# Patient Record
Sex: MALE | Race: WHITE | NOT HISPANIC OR LATINO | Employment: FULL TIME | URBAN - METROPOLITAN AREA
[De-identification: names, ages, dates, MRNs, and addresses within clinical notes are randomized per-mention and may not be internally consistent; named-entity substitution may affect disease eponyms.]

---

## 2017-06-21 ENCOUNTER — OFFICE VISIT (OUTPATIENT)
Dept: FAMILY MEDICINE CLINIC | Facility: CLINIC | Age: 32
End: 2017-06-21

## 2017-06-21 VITALS
SYSTOLIC BLOOD PRESSURE: 118 MMHG | BODY MASS INDEX: 30.7 KG/M2 | WEIGHT: 195.6 LBS | HEIGHT: 67 IN | DIASTOLIC BLOOD PRESSURE: 74 MMHG | OXYGEN SATURATION: 98 % | TEMPERATURE: 98.3 F | HEART RATE: 65 BPM

## 2017-06-21 DIAGNOSIS — R42 VERTIGO: ICD-10-CM

## 2017-06-21 DIAGNOSIS — S06.6X9A: ICD-10-CM

## 2017-06-21 DIAGNOSIS — G40.509 NONINTRACTABLE EPILEPSY DUE TO EXTERNAL CAUSES, WITHOUT STATUS EPILEPTICUS (HCC): ICD-10-CM

## 2017-06-21 DIAGNOSIS — S22.32XA CLOSED FRACTURE OF RIB OF LEFT SIDE, INITIAL ENCOUNTER: ICD-10-CM

## 2017-06-21 DIAGNOSIS — J18.9 PNEUMONIA OF BOTH LUNGS DUE TO INFECTIOUS ORGANISM, UNSPECIFIED PART OF LUNG: Primary | ICD-10-CM

## 2017-06-21 DIAGNOSIS — Z09 HOSPITAL DISCHARGE FOLLOW-UP: ICD-10-CM

## 2017-06-21 DIAGNOSIS — M25.512 ACUTE PAIN OF LEFT SHOULDER: ICD-10-CM

## 2017-06-21 PROBLEM — S22.39XA RIB FRACTURE: Status: ACTIVE | Noted: 2017-06-21

## 2017-06-21 PROBLEM — G40.909 EPILEPSY: Status: ACTIVE | Noted: 2017-06-21

## 2017-06-21 PROCEDURE — 99214 OFFICE O/P EST MOD 30 MIN: CPT | Performed by: INTERNAL MEDICINE

## 2017-06-21 PROCEDURE — 85025 COMPLETE CBC W/AUTO DIFF WBC: CPT | Performed by: INTERNAL MEDICINE

## 2017-06-21 PROCEDURE — 71020 XR CHEST PA AND LATERAL: CPT | Performed by: INTERNAL MEDICINE

## 2017-06-21 PROCEDURE — 73020 X-RAY EXAM OF SHOULDER: CPT | Performed by: INTERNAL MEDICINE

## 2017-06-21 RX ORDER — CLINDAMYCIN HYDROCHLORIDE 300 MG/1
300 CAPSULE ORAL 3 TIMES DAILY
Qty: 15 CAPSULE | Refills: 0 | Status: SHIPPED | OUTPATIENT
Start: 2017-06-21 | End: 2017-07-06

## 2017-06-21 RX ORDER — LEVETIRACETAM 500 MG/1
TABLET ORAL
Refills: 1 | COMMUNITY
Start: 2017-06-15 | End: 2017-08-30

## 2017-06-21 RX ORDER — CLINDAMYCIN HYDROCHLORIDE 300 MG/1
300 CAPSULE ORAL 3 TIMES DAILY
Qty: 15 CAPSULE | Refills: 0 | Status: SHIPPED | OUTPATIENT
Start: 2017-06-21 | End: 2017-06-21 | Stop reason: SDUPTHER

## 2017-06-21 NOTE — PROGRESS NOTES
Subjective   Garrick Guo is a 31 y.o. male. Patient is here today for hospital follow-up following a complicated history.  Apparently he may have had a fall at home and was found having seizures.  He was transported [facility] where he was found to have what looked like an aspiration pneumonia and was on a ventilator initially.  He was discharged on Augusto an anabiotic.  Subsequently developed some vertigo and was seen at a different emergency room had a CT scan of the brain which showed a subarachnoid hemorrhage and was transferred to [place].  He was discharged from there on Keppra and told that he had a stable subarachnoid hemorrhage and to rib fractures on the left and that he would need ENT and neurology follow-ups.  Here in the office the patient seems alert and stable and is having no respiratory problems.  He is having some left chest pain and some left shoulder pain.  I have a discharge summary from [facility] but no real information from [facility].    Chief Complaint   Patient presents with   • Fall     follow up from hospital           Vitals:    06/21/17 1001   BP: 118/74   Pulse: 65   Temp: 98.3 °F (36.8 °C)   SpO2: 98%     The following portions of the patient's history were reviewed and updated as appropriate: allergies, current medications, past family history, past medical history, past social history, past surgical history and problem list.    Past Medical History:   Diagnosis Date   • Acute sinusitis    • ADD (attention deficit disorder)    • Carpal tunnel syndrome    • Chronic bronchitis    • Viral URI       Allergies   Allergen Reactions   • Amoxicillin       Social History     Social History   • Marital status:      Spouse name: N/A   • Number of children: N/A   • Years of education: N/A     Occupational History   • Not on file.     Social History Main Topics   • Smoking status: Former Smoker   • Smokeless tobacco: Never Used   • Alcohol use No    • Drug use: No   • Sexual activity: Not Currently     Other Topics Concern   • Not on file     Social History Narrative   • No narrative on file        Current Outpatient Prescriptions:   •  levETIRAcetam (KEPPRA) 500 MG tablet, TK 1 T PO BID, Disp: , Rfl: 1  •  montelukast (SINGULAIR) 10 MG tablet, Take 1 tablet by mouth nightly., Disp: , Rfl:   •  fluticasone (FLONASE) 50 MCG/ACT nasal spray, into each nostril daily. Use 2 sprays in each nostril once daily, Disp: , Rfl:      Objective     History of Present Illness     Review of Systems   Constitutional: Negative.    HENT: Negative.    Eyes: Negative.    Respiratory: Negative.    Cardiovascular: Negative.    Gastrointestinal: Negative.    Genitourinary: Negative.    Musculoskeletal:        Left shoulder pain with movements and left lateral chest pain   Skin: Negative.    Neurological: Positive for seizures.   Psychiatric/Behavioral: Negative.        Physical Exam   Constitutional: He appears well-developed and well-nourished.   Pleasant, cooperative and in no distress with blood pressure 120/80 and quite alert.   HENT:   Head: Normocephalic and atraumatic.   Eyes: Conjunctivae are normal. Pupils are equal, round, and reactive to light.   Neck: Normal range of motion. Neck supple. No thyromegaly present.   Cardiovascular: Normal rate, regular rhythm and normal heart sounds.    No murmur heard.  Pulmonary/Chest: Effort normal and breath sounds normal. No respiratory distress. He has no wheezes. He has no rales. He exhibits tenderness.   Left anterior lateral chest tenderness to palpation   Abdominal: Soft.   Musculoskeletal:   Left shoulder pain with movements   Neurological: He is alert.   Skin: Skin is warm and dry.   Psychiatric: He has a normal mood and affect. His behavior is normal.   Nursing note and vitals reviewed.      ASSESSMENT  the patient appears stable currently.  Blood pressures fine and his lungs sound pretty clear.  He is having left lateral  chest pain and left shoulder pain.  He's had no further seizures since being on the Keppra.  By history he also has a subarachnoid hemorrhage.  He only has completed a 5 day course of clindamycin.  His chest x-ray was reviewed and generally appears normal to me.  Also I could see no fracture in his left shoulder     Problem List Items Addressed This Visit        Respiratory    Pneumonia - Primary    Relevant Orders    POC CBC With / Auto Diff    XR Chest PA & Lateral       Nervous and Auditory    Epilepsy    Relevant Medications    levETIRAcetam (KEPPRA) 500 MG tablet    Other Relevant Orders    Comprehensive Metabolic Panel    Levetiracetam Level (Keppra)       Musculoskeletal and Integument    Rib fracture    Relevant Orders    XR Chest PA & Lateral    XR Shoulder 1 View Left      Other Visit Diagnoses     Acute pain of left shoulder              PLAN  The patient will not drive.  He was advised to not use alcohol.  The patient will complete a 10 day course of clindamycin 300 mg 3 times a day.  The patient will arrange for follow-up visit the [facility] neurology clinic and I have referred him to Dr. Bates, ENT for his vertigo.  I plan on seeing him back in 1-2 weeks to see how things are doing.    There are no Patient Instructions on file for this visit.  No Follow-up on file.

## 2017-06-23 LAB
ALBUMIN SERPL-MCNC: 4.7 G/DL (ref 3.5–5.2)
ALBUMIN/GLOB SERPL: 1.8 G/DL
ALP SERPL-CCNC: 51 U/L (ref 39–117)
ALT SERPL-CCNC: 147 U/L (ref 1–41)
AST SERPL-CCNC: 78 U/L (ref 1–40)
BILIRUB SERPL-MCNC: 0.3 MG/DL (ref 0.1–1.2)
BUN SERPL-MCNC: 14 MG/DL (ref 6–20)
BUN/CREAT SERPL: 17.5 (ref 7–25)
CALCIUM SERPL-MCNC: 10.4 MG/DL (ref 8.6–10.5)
CHLORIDE SERPL-SCNC: 101 MMOL/L (ref 98–107)
CO2 SERPL-SCNC: 23.8 MMOL/L (ref 22–29)
CREAT SERPL-MCNC: 0.8 MG/DL (ref 0.76–1.27)
GLOBULIN SER CALC-MCNC: 2.6 GM/DL
GLUCOSE SERPL-MCNC: 89 MG/DL (ref 65–99)
LEVETIRACETAM SERPL-MCNC: 11.1 UG/ML (ref 10–40)
POTASSIUM SERPL-SCNC: 4.7 MMOL/L (ref 3.5–5.2)
PROT SERPL-MCNC: 7.3 G/DL (ref 6–8.5)
SODIUM SERPL-SCNC: 141 MMOL/L (ref 136–145)

## 2017-07-06 ENCOUNTER — OFFICE VISIT (OUTPATIENT)
Dept: FAMILY MEDICINE CLINIC | Facility: CLINIC | Age: 32
End: 2017-07-06

## 2017-07-06 VITALS
WEIGHT: 195.6 LBS | HEIGHT: 67 IN | OXYGEN SATURATION: 98 % | SYSTOLIC BLOOD PRESSURE: 120 MMHG | DIASTOLIC BLOOD PRESSURE: 78 MMHG | HEART RATE: 51 BPM | BODY MASS INDEX: 30.7 KG/M2 | TEMPERATURE: 98.6 F

## 2017-07-06 DIAGNOSIS — S06.6X9A: ICD-10-CM

## 2017-07-06 DIAGNOSIS — G40.509 NONINTRACTABLE EPILEPSY DUE TO EXTERNAL CAUSES, WITHOUT STATUS EPILEPTICUS (HCC): ICD-10-CM

## 2017-07-06 DIAGNOSIS — M25.512 ACUTE PAIN OF LEFT SHOULDER: ICD-10-CM

## 2017-07-06 DIAGNOSIS — J18.9 PNEUMONIA OF BOTH LUNGS DUE TO INFECTIOUS ORGANISM, UNSPECIFIED PART OF LUNG: Primary | ICD-10-CM

## 2017-07-06 PROCEDURE — 99213 OFFICE O/P EST LOW 20 MIN: CPT | Performed by: INTERNAL MEDICINE

## 2017-07-06 RX ORDER — MONTELUKAST SODIUM 10 MG/1
10 TABLET ORAL NIGHTLY
Qty: 30 TABLET | Refills: 5 | Status: SHIPPED | OUTPATIENT
Start: 2017-07-06 | End: 2017-11-08 | Stop reason: ALTCHOICE

## 2017-07-06 NOTE — PROGRESS NOTES
Subjective   Garrick Guo is a 31 y.o. male. Patient is here today for follow-up on epilepsy, a subarachnoid hemorrhage, vertigo, history of recent rib fracture and continuing left shoulder pain.  Since I saw him last patient's had no further seizures or problems.  Neurologically he is intact.  His chest and rib pain are improved.  He still having significant left shoulder pain with decreased range of motion and tenderness when he tries to lay on it.  He's not had any breathing problems.  He does have an upcoming appointment with the ENT specialist as well as the neurologists at [facility].     Chief Complaint   Patient presents with   • Follow-up     FROM HOSP STAY AND FU AFTER ANTIBIOTICS          Vitals:    07/06/17 1004   BP: 120/78   Pulse: 51   Temp: 98.6 °F (37 °C)   SpO2: 98%     The following portions of the patient's history were reviewed and updated as appropriate: allergies, current medications, past family history, past medical history, past social history, past surgical history and problem list.    Past Medical History:   Diagnosis Date   • Acute sinusitis    • ADD (attention deficit disorder)    • Carpal tunnel syndrome    • Chronic bronchitis    • Viral URI       Allergies   Allergen Reactions   • Amoxicillin       Social History     Social History   • Marital status:      Spouse name: N/A   • Number of children: N/A   • Years of education: N/A     Occupational History   • Not on file.     Social History Main Topics   • Smoking status: Former Smoker   • Smokeless tobacco: Never Used   • Alcohol use No   • Drug use: No   • Sexual activity: Not Currently     Other Topics Concern   • Not on file     Social History Narrative        Current Outpatient Prescriptions:   •  fluticasone (FLONASE) 50 MCG/ACT nasal spray, into each nostril daily. Use 2 sprays in each nostril once daily, Disp: , Rfl:   •  levETIRAcetam (KEPPRA) 500 MG tablet, TK 1 T PO BID, Disp: , Rfl: 1  •  montelukast  (SINGULAIR) 10 MG tablet, Take 1 tablet by mouth Every Night., Disp: 30 tablet, Rfl: 5     Objective     History of Present Illness     Review of Systems   Constitutional: Negative.    HENT: Negative.    Eyes: Negative.    Respiratory: Negative.    Cardiovascular: Negative.    Gastrointestinal: Negative.    Genitourinary: Negative.    Musculoskeletal: Negative.    Skin: Negative.    Neurological: Negative.    Psychiatric/Behavioral: Negative.        Physical Exam   Constitutional: He appears well-developed and well-nourished.   Pleasant, cooperative and in no distress with a blood pressure 120/80   HENT:   Head: Normocephalic and atraumatic.   Eyes: Conjunctivae are normal.   Neck: Normal range of motion. Neck supple.   Cardiovascular: Normal rate, regular rhythm and normal heart sounds.    Pulmonary/Chest: Effort normal and breath sounds normal. No respiratory distress. He has no wheezes. He has no rales.   Musculoskeletal: He exhibits tenderness.   Patient has tenderness in the left shoulder area and can only abduct it to about 90°.   Neurological: He is alert.   Skin: Skin is warm and dry.   Psychiatric: He has a normal mood and affect. His behavior is normal.   Nursing note and vitals reviewed.      ASSESSMENT  the patient generally seems stable and is had no further seizures.  His chest wall discomfort is improving and his breathing is fine.  His lungs are clear and her nothing to suggest a pneumonia.  He is having significant left shoulder pain that's better but still seems significant.     Problem List Items Addressed This Visit        Respiratory    RESOLVED: Pneumonia - Primary    Relevant Medications    montelukast (SINGULAIR) 10 MG tablet       Nervous and Auditory    Epilepsy    Subarachnoid hemorrhage following injury, with loss of consciousness    Acute pain of left shoulder    Relevant Orders    Ambulatory Referral to Orthopedic Surgery          PLAN  The patient will be seeing the neurologist and ENT  specialist.  I am going to refer him to Dr. Philip's group, orthopedics to see about his left shoulder discomfort.  I don't need to schedule him back for follow-up at this time      There are no Patient Instructions on file for this visit.  No Follow-up on file.

## 2017-08-30 ENCOUNTER — OFFICE VISIT (OUTPATIENT)
Dept: FAMILY MEDICINE CLINIC | Facility: CLINIC | Age: 32
End: 2017-08-30

## 2017-08-30 VITALS
HEIGHT: 67 IN | TEMPERATURE: 98.2 F | WEIGHT: 197.4 LBS | SYSTOLIC BLOOD PRESSURE: 120 MMHG | OXYGEN SATURATION: 98 % | BODY MASS INDEX: 30.98 KG/M2 | DIASTOLIC BLOOD PRESSURE: 80 MMHG | HEART RATE: 66 BPM

## 2017-08-30 DIAGNOSIS — J40 BRONCHITIS: ICD-10-CM

## 2017-08-30 DIAGNOSIS — G40.509 NONINTRACTABLE EPILEPSY DUE TO EXTERNAL CAUSES, WITHOUT STATUS EPILEPTICUS (HCC): ICD-10-CM

## 2017-08-30 DIAGNOSIS — L98.9 SKIN LESION: ICD-10-CM

## 2017-08-30 DIAGNOSIS — S22.32XS: ICD-10-CM

## 2017-08-30 DIAGNOSIS — M25.512 ACUTE PAIN OF LEFT SHOULDER: Primary | ICD-10-CM

## 2017-08-30 PROBLEM — S06.6X9A SUBARACHNOID HEMORRHAGE FOLLOWING INJURY, WITH LOSS OF CONSCIOUSNESS (HCC): Status: RESOLVED | Noted: 2017-06-21 | Resolved: 2017-08-30

## 2017-08-30 PROBLEM — S22.39XA RIB FRACTURE: Status: RESOLVED | Noted: 2017-06-21 | Resolved: 2017-08-30

## 2017-08-30 PROCEDURE — 90715 TDAP VACCINE 7 YRS/> IM: CPT | Performed by: INTERNAL MEDICINE

## 2017-08-30 PROCEDURE — 99214 OFFICE O/P EST MOD 30 MIN: CPT | Performed by: INTERNAL MEDICINE

## 2017-08-30 PROCEDURE — 90471 IMMUNIZATION ADMIN: CPT | Performed by: INTERNAL MEDICINE

## 2017-08-30 RX ORDER — LEVETIRACETAM 750 MG/1
750 TABLET ORAL 2 TIMES DAILY
Qty: 180 TABLET | Refills: 3 | Status: SHIPPED | OUTPATIENT
Start: 2017-08-30 | End: 2017-12-01 | Stop reason: SDUPTHER

## 2017-08-30 RX ORDER — LEVETIRACETAM 750 MG/1
TABLET ORAL
Refills: 0 | COMMUNITY
Start: 2017-08-08 | End: 2017-08-30 | Stop reason: SDUPTHER

## 2017-08-30 NOTE — PROGRESS NOTES
Subjective   Garrick Guo is a 31 y.o. male. Patient is here today for follow-up on his seizure disorder controlled with Keppra, left shoulder injury related to epilepsy.  Patient's been receiving physical therapy and his shoulder is much improved and almost completely back to normal.  He saw the neurologist yesterday and has generally been doing okay on the Keppra twice a day.  Patient does have some cough that's been going on for several weeks that it's intermittent and sometimes productive.  His sons being checked for whooping cough and he would like to get a tetanus with whooping cough immunization.  Finally the patient has a 1 cm pedunculated vascular appearing lesion on his left forearm any like to get removed.  It's been present for several months and enlarging and has been injured a couple of times    Past medical history history of a possible subarachnoid hemorrhage that resolved without specific treatment  Chief Complaint   Patient presents with   • Altered Mental Status     PT HAD TWO HOSPITAL VISITS RECENTLY, PT HAD MULTIPLE SEIZURES AFTER FRACTURING SKULL THAT CAUSED A HEMORRAGE AND THEN ON THE 7TH, WENT  TO WORK COULDN'T REMEMBER ANYTHING, WENT TO HOSPITAL AND PT SAID HE IS STILL HAVING SOME AMNESIA    • Cough     PT QUIT SMOKING AFTER HIS FIRST HOSPITAL VISIT, AND HE HAS BEEN HAVING A PROGRESSIVE COUGH SINCE THEN           Vitals:    08/30/17 0856   BP: 120/80   Pulse: 66   Temp: 98.2 °F (36.8 °C)   SpO2: 98%     The following portions of the patient's history were reviewed and updated as appropriate: allergies, current medications, past family history, past medical history, past social history, past surgical history and problem list.    Past Medical History:   Diagnosis Date   • Acute sinusitis    • ADD (attention deficit disorder)    • Carpal tunnel syndrome    • Chronic bronchitis    • Viral URI       Allergies   Allergen Reactions   • Amoxicillin       Social History     Social History    • Marital status:      Spouse name: N/A   • Number of children: N/A   • Years of education: N/A     Occupational History   • Not on file.     Social History Main Topics   • Smoking status: Former Smoker   • Smokeless tobacco: Never Used   • Alcohol use No   • Drug use: No   • Sexual activity: Not Currently     Other Topics Concern   • Not on file     Social History Narrative        Current Outpatient Prescriptions:   •  fluticasone (FLONASE) 50 MCG/ACT nasal spray, into each nostril daily. Use 2 sprays in each nostril once daily, Disp: , Rfl:   •  levETIRAcetam (KEPPRA) 750 MG tablet, Take 1 tablet by mouth 2 (Two) Times a Day., Disp: 180 tablet, Rfl: 3  •  montelukast (SINGULAIR) 10 MG tablet, Take 1 tablet by mouth Every Night., Disp: 30 tablet, Rfl: 5     Objective     History of Present Illness     Review of Systems   Constitutional: Negative.    HENT: Negative.    Eyes: Negative.    Respiratory: Negative.    Cardiovascular: Negative.    Gastrointestinal: Negative.    Genitourinary: Negative.    Musculoskeletal: Negative.    Skin: Negative.    Neurological: Negative.    Psychiatric/Behavioral: Negative.        Physical Exam   Constitutional: He is oriented to person, place, and time. He appears well-developed and well-nourished.   Pleasant, cooperative and in no distress with a blood pressure 120/80   HENT:   Head: Normocephalic and atraumatic.   Eyes: Conjunctivae are normal. Pupils are equal, round, and reactive to light.   Neck: Normal range of motion. Neck supple. No thyromegaly present.   Cardiovascular: Normal rate, regular rhythm and normal heart sounds.    Pulmonary/Chest: Effort normal and breath sounds normal. No respiratory distress. He has no wheezes. He has no rales.   Musculoskeletal: Normal range of motion. He exhibits no edema.   Neurological: He is alert and oriented to person, place, and time. No cranial nerve deficit. Coordination normal.   Skin: Skin is warm and dry.   There is a 1  cm pedunculated papilloma that appears quite vascular on the left lateral forearm.   Psychiatric: He has a normal mood and affect. His behavior is normal.   Nursing note and vitals reviewed.      ASSESSMENT  1-epilepsy under good control on Keppra twice daily  2-left shoulder injury related to seizure that is almost completely resolved with physical therapy  #3-pedunculated papilloma left forearm  #4-history of possible subarachnoid hemorrhage that's resolved on most recent CT scan  #5-mild bronchitis symptoms, possibly allergic     Problem List Items Addressed This Visit        Nervous and Auditory    Epilepsy    Relevant Medications    levETIRAcetam (KEPPRA) 750 MG tablet    Acute pain of left shoulder - Primary       Musculoskeletal and Integument    Skin lesion    Relevant Orders    Ambulatory Referral to General Surgery    RESOLVED: Rib fracture      Other Visit Diagnoses     Bronchitis              PLAN  The patient received a Tdap immunization today per his request and son having possible whooping cough.  Patient will continue physical therapy as needed.  He will continue the Keppra and I will have him get back on Zyrtec, Flonase and Singulair.  I referred the patient to surgical group, Dr. Flores's group for removal of the skin lesion on his left forearm.  I plan on rechecking him in 6 months with a CBC, CMP, lipid panel and Keppra level    There are no Patient Instructions on file for this visit.  Return in about 6 months (around 2/28/2018) for with labs.

## 2017-09-07 ENCOUNTER — OFFICE VISIT (OUTPATIENT)
Dept: FAMILY MEDICINE CLINIC | Facility: CLINIC | Age: 32
End: 2017-09-07

## 2017-09-07 VITALS
WEIGHT: 194 LBS | RESPIRATION RATE: 18 BRPM | HEIGHT: 67 IN | BODY MASS INDEX: 30.45 KG/M2 | SYSTOLIC BLOOD PRESSURE: 122 MMHG | DIASTOLIC BLOOD PRESSURE: 70 MMHG | HEART RATE: 54 BPM

## 2017-09-07 DIAGNOSIS — G40.509 NONINTRACTABLE EPILEPSY DUE TO EXTERNAL CAUSES, WITHOUT STATUS EPILEPTICUS (HCC): Primary | ICD-10-CM

## 2017-09-07 DIAGNOSIS — F19.10 SUBSTANCE ABUSE (HCC): ICD-10-CM

## 2017-09-07 PROCEDURE — 99213 OFFICE O/P EST LOW 20 MIN: CPT | Performed by: INTERNAL MEDICINE

## 2017-09-07 NOTE — PROGRESS NOTES
Subjective   Garrick Guo is a 31 y.o. male. Patient is here today for   Chief Complaint   Patient presents with   • Memory Loss          Vitals:    09/07/17 1013   BP: 122/70   Pulse: 54   Resp: 18     The following portions of the patient's history were reviewed and updated as appropriate: allergies, current medications, past family history, past medical history, past social history, past surgical history and problem list.    Past Medical History:   Diagnosis Date   • Acute sinusitis    • ADD (attention deficit disorder)    • Carpal tunnel syndrome    • Chronic bronchitis    • Viral URI       Allergies   Allergen Reactions   • Amoxicillin       Social History     Social History   • Marital status:      Spouse name: N/A   • Number of children: N/A   • Years of education: N/A     Occupational History   • Not on file.     Social History Main Topics   • Smoking status: Former Smoker   • Smokeless tobacco: Never Used   • Alcohol use No   • Drug use: No   • Sexual activity: Not Currently     Other Topics Concern   • Not on file     Social History Narrative        Current Outpatient Prescriptions:   •  fluticasone (FLONASE) 50 MCG/ACT nasal spray, into each nostril daily. Use 2 sprays in each nostril once daily, Disp: , Rfl:   •  levETIRAcetam (KEPPRA) 750 MG tablet, Take 1 tablet by mouth 2 (Two) Times a Day., Disp: 180 tablet, Rfl: 3  •  montelukast (SINGULAIR) 10 MG tablet, Take 1 tablet by mouth Every Night., Disp: 30 tablet, Rfl: 5     Objective     History of Present Illness Garrick was driving to work yesterday stopped and bought a pack of cigarettes.  He quit smoking months ago and wonders why he would do that.  He has a seizure disorder and is on Keppra.  He saw his neurologist last week who felt that he was stable.  He uses marijuana product on a daily basis.  When he got to work he felt as though he had some symptoms of a panic attack.    Review of Systems   Constitutional: Negative.     Respiratory: Negative.    Cardiovascular: Negative.    Psychiatric/Behavioral: Positive for confusion.       Physical Exam   Constitutional: He appears well-developed and well-nourished.   Neurological: He is alert. No cranial nerve deficit. He displays a negative Romberg sign. Coordination and gait normal.   Reflex Scores:       Bicep reflexes are 2+ on the right side and 2+ on the left side.       Brachioradialis reflexes are 2+ on the right side and 2+ on the left side.       Patellar reflexes are 2+ on the right side and 2+ on the left side.       Achilles reflexes are 2+ on the right side and 2+ on the left side.  Psychiatric: He has a normal mood and affect.   Vitals reviewed.      ASSESSMENT     Problem List Items Addressed This Visit        Nervous and Auditory    Epilepsy - Primary       Other    Substance abuse          PLAN  Patient Instructions   Discussed effects from long-term marijuana usage including paranoia and memory loss.  Discussed importance of cessation.  Suggest getting a neuropsychological evaluation which you refuse to.    No Follow-up on file.

## 2017-09-07 NOTE — PATIENT INSTRUCTIONS
Discussed effects from long-term marijuana usage including paranoia and memory loss.  Discussed importance of cessation.  Suggest getting a neuropsychological evaluation which you refuse to.

## 2017-09-14 ENCOUNTER — TELEPHONE (OUTPATIENT)
Dept: FAMILY MEDICINE CLINIC | Facility: CLINIC | Age: 32
End: 2017-09-14

## 2017-09-14 NOTE — TELEPHONE ENCOUNTER
S/W DANNI AT [facility]- SHE IS RE-FAXING THE FORM TO MY ATTENTION SO IT CAN BE FILLED OUT.     ----- Message from Susana Babcock sent at 9/13/2017 11:41 AM EDT -----  DANNI  FOR [facility] CALLED IN TO CHECK ON THE STATUS OF THE TREATMENT PLAN SHE SENT OVER.  DANNI STATED SHE HAS SENT IT OVER SEVERAL TIMES AND CONFIRMED THAT WE RECEIVED IT BUT STILL HAS NOT RECEIVED A RESPONSE.    PLEASE CONTACT DANNI [phone]

## 2017-10-25 DIAGNOSIS — L98.9 SKIN LESION: ICD-10-CM

## 2017-11-08 ENCOUNTER — OFFICE VISIT (OUTPATIENT)
Dept: FAMILY MEDICINE CLINIC | Facility: CLINIC | Age: 32
End: 2017-11-08

## 2017-11-08 VITALS
HEART RATE: 80 BPM | DIASTOLIC BLOOD PRESSURE: 74 MMHG | SYSTOLIC BLOOD PRESSURE: 114 MMHG | BODY MASS INDEX: 31.55 KG/M2 | WEIGHT: 201 LBS | TEMPERATURE: 98.6 F | HEIGHT: 67 IN | OXYGEN SATURATION: 96 %

## 2017-11-08 DIAGNOSIS — J20.9 ACUTE BRONCHITIS, UNSPECIFIED ORGANISM: Primary | ICD-10-CM

## 2017-11-08 DIAGNOSIS — Z71.6 ENCOUNTER FOR TOBACCO USE CESSATION COUNSELING: ICD-10-CM

## 2017-11-08 PROCEDURE — 99213 OFFICE O/P EST LOW 20 MIN: CPT | Performed by: NURSE PRACTITIONER

## 2017-11-08 RX ORDER — DOXYCYCLINE HYCLATE 100 MG/1
100 TABLET, DELAYED RELEASE ORAL 2 TIMES DAILY
Qty: 20 TABLET | Refills: 0 | Status: SHIPPED | OUTPATIENT
Start: 2017-11-08 | End: 2017-12-01

## 2017-11-08 RX ORDER — METHYLPREDNISOLONE 4 MG/1
TABLET ORAL
Qty: 21 TABLET | Refills: 0 | Status: SHIPPED | OUTPATIENT
Start: 2017-11-08 | End: 2017-12-01 | Stop reason: SINTOL

## 2017-11-08 NOTE — PROGRESS NOTES
Subjective   Garrick Guo is a 31 y.o. male.   Chief Complaint   Patient presents with   • Cough     for 3 weeks    • Earache     lt ear pain feels full      Vitals:    11/08/17 1413   BP: 114/74   Pulse: 80   Temp: 98.6 °F (37 °C)   SpO2: 96%     No LMP for male patient.    History of Present Illness  Garrick is a patient of Dr Emery who is here for an acute visit. He c/o chest congestion, productive cough with purulent and brown sputum, and blood tinged sputum for 3 weeks. He has felt short of breath but denies wheezing. He has also had some chest tightness on the left side from coughing. He denies fever, chills or body aches. He has a history of pneumonia.   He continues to smoke only while at work but he would like to stop. We discussed his options and he would like to start on the nicotine patch.     The following portions of the patient's history were reviewed and updated as appropriate: allergies, current medications, past family history, past medical history, past social history, past surgical history and problem list.    Review of Systems   Constitutional: Positive for fatigue. Negative for diaphoresis and fever.   HENT: Positive for ear pain.    Respiratory: Positive for cough, chest tightness and shortness of breath. Negative for wheezing.    Cardiovascular: Negative for chest pain, palpitations and leg swelling.   Musculoskeletal: Negative for arthralgias.   Neurological: Positive for seizures (has been seizure free for 3 months or longer ) and headaches.       Objective   Physical Exam   Constitutional: Vital signs are normal. He appears well-developed and well-nourished. He appears ill. No distress.   Wearing sunglasses during exam    HENT:   Mouth/Throat: Uvula is midline and oropharynx is clear and moist.   Cerumen impaction bilaterally    Cardiovascular: Normal rate, regular rhythm and normal heart sounds.    Pulmonary/Chest: Effort normal and breath sounds normal. He has no wheezes. He  has no rhonchi.   Congested cough    Neurological: He is alert.   Skin: Skin is warm and dry.       Assessment/Plan   Garrick was seen today for cough and earache.    Diagnoses and all orders for this visit:    Acute bronchitis, unspecified organism    Encounter for tobacco use cessation counseling    Other orders  -     doxycycline (DORYX) 100 MG enteric coated tablet; Take 1 tablet by mouth 2 (Two) Times a Day.  -     MethylPREDNISolone (MEDROL, RAMIN,) 4 MG tablet; Take as directed on package instructions.      Start doxycycline,   Cough and deep breath  mucinex   Start medrol  Offered inhaler but he states that they don't work for him  We discussed his options for smoking cessation and he would like to try the nicotine patch. He is ready to quit again but feels that he cannot do it without a aid for cessation. There is a warning on nicotine patch to use caution with history of seizures. I asked him to please call his neurologist and get an ok prior to starting it.   Follow up in 1-2 weeks with Dr Emery for recheck on cough

## 2017-11-14 ENCOUNTER — TELEPHONE (OUTPATIENT)
Dept: FAMILY MEDICINE CLINIC | Facility: CLINIC | Age: 32
End: 2017-11-14

## 2017-11-14 NOTE — TELEPHONE ENCOUNTER
SPOKE WITH PATIENT AND ADVISED WHAT DR. ALVAREZ SAID. PT WILL KEEP UPCOMING APPT.     ----- Message from Yash Alvarez MD sent at 11/14/2017 11:41 AM EST -----  Contact: PT  He looks like he is already scheduled for a visit on December 1 and would just go ahead and keep that.      ----- Message -----     From: Sandhya Adams MA     Sent: 11/14/2017  11:22 AM       To: MD DR. ROMA Vasquez,    PATIENT SAW JOANA ON 11/08/2017 AND WAS GIVEN DOXYCYCLINE AND A STEROID PACK FOR BRONCHITIS AND HE SAID THEY CAUSED HIM TO HAVE SEIZURES. DOES PT NEED TO COME IN TO BE SEEN OR WHAT NEEDS TO HAPPEN? PLEASE ADVISE. THANK YOU.      ----- Message -----     From: Susana Babcock     Sent: 11/14/2017  10:17 AM       To: Sandhya Adams MA    PT PHONE NUMBER [phone]        PT IS ON  levETIRAcetam (KEPPRA) 750 MG tablet AND THE STEROID PACK AND DOXYCYLENE THAT WAS PRESCRIBED TO HIM MADE HIM HAVE SEIZURES

## 2017-12-01 ENCOUNTER — OFFICE VISIT (OUTPATIENT)
Dept: FAMILY MEDICINE CLINIC | Facility: CLINIC | Age: 32
End: 2017-12-01

## 2017-12-01 VITALS
HEART RATE: 72 BPM | OXYGEN SATURATION: 98 % | WEIGHT: 200 LBS | HEIGHT: 67 IN | TEMPERATURE: 98.4 F | SYSTOLIC BLOOD PRESSURE: 120 MMHG | DIASTOLIC BLOOD PRESSURE: 84 MMHG | BODY MASS INDEX: 31.39 KG/M2

## 2017-12-01 DIAGNOSIS — F19.10 SUBSTANCE ABUSE (HCC): ICD-10-CM

## 2017-12-01 DIAGNOSIS — G40.509 NONINTRACTABLE EPILEPSY DUE TO EXTERNAL CAUSES, WITHOUT STATUS EPILEPTICUS (HCC): Primary | ICD-10-CM

## 2017-12-01 PROCEDURE — 99213 OFFICE O/P EST LOW 20 MIN: CPT | Performed by: INTERNAL MEDICINE

## 2017-12-01 RX ORDER — LEVETIRACETAM 1000 MG/1
1000 TABLET ORAL 2 TIMES DAILY
Qty: 60 TABLET | Refills: 5 | Status: SHIPPED | OUTPATIENT
Start: 2017-12-01 | End: 2018-03-16 | Stop reason: SDUPTHER

## 2017-12-01 NOTE — PROGRESS NOTES
Subjective   Garrick Guo is a 31 y.o. male. Patient is here today for follow-up on an apparent recent seizure that he had.  It might of been affected somewhat by doxycycline according to the neurologist.  I have not heard of that but in any event his Keppra dose was increased to thousand milligrams twice a day.  Since then he's had no further seizures or particular problems.  He does have an upcoming court date because of a traffic violation related to this and wants me to give a deposition.  He also is trying to quit cigarette smoking.    Chief Complaint   Patient presents with   • Bronchitis     PT HERE FOR FOLLOW UP ON COUGH- SAW JOANA ON 11/08/2017 AND WAS DIAGNOSED WITH BRONCHITIS- JOANA GAVE PT DOXYCYCLINE AND PT HAD A SEIZURE           Vitals:    12/01/17 0847   BP: 120/84   Pulse: 72   Temp: 98.4 °F (36.9 °C)   SpO2: 98%     The following portions of the patient's history were reviewed and updated as appropriate: allergies, current medications, past family history, past medical history, past social history, past surgical history and problem list.    Past Medical History:   Diagnosis Date   • Acute sinusitis    • ADD (attention deficit disorder)    • Carpal tunnel syndrome    • Chronic bronchitis    • Viral URI       Allergies   Allergen Reactions   • Amoxicillin    • Doxycycline Other (See Comments)     Seizure/memory loss      Social History     Social History   • Marital status:      Spouse name: N/A   • Number of children: N/A   • Years of education: N/A     Occupational History   • Not on file.     Social History Main Topics   • Smoking status: Former Smoker   • Smokeless tobacco: Never Used   • Alcohol use No   • Drug use: No   • Sexual activity: Not Currently     Other Topics Concern   • Not on file     Social History Narrative        Current Outpatient Prescriptions:   •  Cetirizine HCl (ZYRTEC ALLERGY) 10 MG capsule, Take  by mouth., Disp: , Rfl:   •  levETIRAcetam (KEPPRA) 1000  MG tablet, Take 1 tablet by mouth 2 (Two) Times a Day., Disp: 60 tablet, Rfl: 5     Objective     History of Present Illness     Review of Systems   Constitutional: Negative.    HENT: Negative.    Eyes: Negative.    Respiratory: Negative.    Cardiovascular: Negative.    Gastrointestinal: Negative.    Endocrine: Negative.    Genitourinary: Negative.    Musculoskeletal: Negative.    Skin: Negative.    Neurological: Positive for seizures.   Psychiatric/Behavioral: Negative.        Physical Exam   Constitutional: He is oriented to person, place, and time. He appears well-developed and well-nourished.   Pleasant, cooperative no distress with a blood pressure 120/80   HENT:   Head: Normocephalic and atraumatic.   Eyes: Conjunctivae are normal. Pupils are equal, round, and reactive to light.   Neck: Normal range of motion. Neck supple.   Cardiovascular: Normal rate, regular rhythm and normal heart sounds.    Pulmonary/Chest: Effort normal and breath sounds normal. No respiratory distress. He has no wheezes. He has no rales.   Musculoskeletal: Normal range of motion. He exhibits no edema.   Neurological: He is alert and oriented to person, place, and time.   Skin: Skin is warm and dry.   Psychiatric: He has a normal mood and affect. His behavior is normal. Judgment and thought content normal.   Nursing note and vitals reviewed.      ASSESSMENT  the patient has been doing well with no seizures since his Keppra dosage was increased to thousand milligrams twice a day.  He is trying to stop smoking.  He has no new acute complaints today otherwise.     Problem List Items Addressed This Visit        Nervous and Auditory    Epilepsy - Primary    Relevant Medications    levETIRAcetam (KEPPRA) 1000 MG tablet       Other    Substance abuse          PLAN  The patient declines a flu shot.  He will continue Keppra 1000 mg twice a day and I like to see him in 6 weeks with a CBC, CMP, lipid panel, Keppra level    There are no Patient  Instructions on file for this visit.  Return in about 6 weeks (around 1/12/2018).

## 2017-12-27 ENCOUNTER — OFFICE VISIT (OUTPATIENT)
Dept: FAMILY MEDICINE CLINIC | Facility: CLINIC | Age: 32
End: 2017-12-27

## 2017-12-27 VITALS
DIASTOLIC BLOOD PRESSURE: 72 MMHG | BODY MASS INDEX: 31.71 KG/M2 | RESPIRATION RATE: 18 BRPM | WEIGHT: 202 LBS | SYSTOLIC BLOOD PRESSURE: 120 MMHG | HEIGHT: 67 IN | HEART RATE: 65 BPM

## 2017-12-27 DIAGNOSIS — Z48.02 VISIT FOR SUTURE REMOVAL: Primary | ICD-10-CM

## 2017-12-27 PROCEDURE — 99213 OFFICE O/P EST LOW 20 MIN: CPT | Performed by: INTERNAL MEDICINE

## 2017-12-27 NOTE — PROGRESS NOTES
Subjective   Garrick Guo is a 32 y.o. male. Patient is here today for   Chief Complaint   Patient presents with   • Suture / Staple Removal     left arm, put in 2 weeks ago           Vitals:    12/27/17 1108   BP: 120/72   Pulse: 65   Resp: 18     The following portions of the patient's history were reviewed and updated as appropriate: allergies, current medications, past family history, past medical history, past social history, past surgical history and problem list.    Past Medical History:   Diagnosis Date   • Acute sinusitis    • ADD (attention deficit disorder)    • Carpal tunnel syndrome    • Chronic bronchitis    • Viral URI       Allergies   Allergen Reactions   • Amoxicillin    • Doxycycline Other (See Comments)     Seizure/memory loss      Social History     Social History   • Marital status:      Spouse name: N/A   • Number of children: N/A   • Years of education: N/A     Occupational History   • Not on file.     Social History Main Topics   • Smoking status: Former Smoker   • Smokeless tobacco: Never Used   • Alcohol use No   • Drug use: No   • Sexual activity: Not Currently     Other Topics Concern   • Not on file     Social History Narrative        Current Outpatient Prescriptions:   •  Cetirizine HCl (ZYRTEC ALLERGY) 10 MG capsule, Take  by mouth., Disp: , Rfl:   •  levETIRAcetam (KEPPRA) 1000 MG tablet, Take 1 tablet by mouth 2 (Two) Times a Day., Disp: 60 tablet, Rfl: 5     Objective     History of Present Illness Garrick had a lesion removed from his left forearm 2 weeks ago by a surgeon at [facility] and is here for suture removal.  Pathology is not available to me.    Review of Systems   Constitutional: Negative.    Skin: Positive for wound. Negative for color change.       Physical Exam   Constitutional: He appears well-developed and well-nourished.   Skin:   5-6 cm sutured incision posterior left forearm.   Psychiatric: He has a normal mood and affect.        ASSESSMENT     Problem List Items Addressed This Visit        Other    Visit for suture removal - Primary          PLAN  Patient Instructions   Removed 5 sutures without difficulty.  I'll up with surgery to discuss pathology as scheduled.    No Follow-up on file.

## 2018-01-16 ENCOUNTER — OFFICE VISIT (OUTPATIENT)
Dept: FAMILY MEDICINE CLINIC | Facility: CLINIC | Age: 33
End: 2018-01-16

## 2018-01-16 VITALS
TEMPERATURE: 98.2 F | OXYGEN SATURATION: 98 % | HEIGHT: 67 IN | WEIGHT: 202.6 LBS | SYSTOLIC BLOOD PRESSURE: 110 MMHG | BODY MASS INDEX: 31.8 KG/M2 | DIASTOLIC BLOOD PRESSURE: 80 MMHG | HEART RATE: 71 BPM

## 2018-01-16 DIAGNOSIS — G40.509 NONINTRACTABLE EPILEPSY DUE TO EXTERNAL CAUSES, WITHOUT STATUS EPILEPTICUS (HCC): Primary | ICD-10-CM

## 2018-01-16 DIAGNOSIS — M25.512 ACUTE PAIN OF LEFT SHOULDER: ICD-10-CM

## 2018-01-16 DIAGNOSIS — M79.641 HAND PAIN, RIGHT: ICD-10-CM

## 2018-01-16 DIAGNOSIS — R42 VERTIGO: ICD-10-CM

## 2018-01-16 DIAGNOSIS — Z72.0 TOBACCO ABUSE: ICD-10-CM

## 2018-01-16 PROCEDURE — 73130 X-RAY EXAM OF HAND: CPT | Performed by: INTERNAL MEDICINE

## 2018-01-16 PROCEDURE — 99213 OFFICE O/P EST LOW 20 MIN: CPT | Performed by: INTERNAL MEDICINE

## 2018-01-16 NOTE — PROGRESS NOTES
Subjective   Garrick Guo is a 32 y.o. male. Patient is here today for follow-up on his epilepsy.  Additionally patient continues to smoke and has not had much luck quitting.  He's not tried the patches.  He is not had any seizures.  His only other complaint is ongoing right hand pain since his last seizure.  The pain is in the base of the right fifth knuckle.  It's tender to palpation and hurts into the metacarpal area.  He also continues with some back pain in the thoracic area, aggravated by cough  Chief Complaint   Patient presents with   • Seizures     PT HERE FOR FOLLOW UP           Vitals:    01/16/18 0956   BP: 110/80   Pulse: 71   Temp: 98.2 °F (36.8 °C)   SpO2: 98%     The following portions of the patient's history were reviewed and updated as appropriate: allergies, current medications, past family history, past medical history, past social history, past surgical history and problem list.    Past Medical History:   Diagnosis Date   • Acute sinusitis    • ADD (attention deficit disorder)    • Carpal tunnel syndrome    • Chronic bronchitis    • Viral URI       Allergies   Allergen Reactions   • Amoxicillin    • Doxycycline Other (See Comments)     Seizure/memory loss      Social History     Social History   • Marital status:      Spouse name: N/A   • Number of children: N/A   • Years of education: N/A     Occupational History   • Not on file.     Social History Main Topics   • Smoking status: Former Smoker   • Smokeless tobacco: Never Used   • Alcohol use No   • Drug use: No   • Sexual activity: Not Currently     Other Topics Concern   • Not on file     Social History Narrative        Current Outpatient Prescriptions:   •  Cetirizine HCl (ZYRTEC ALLERGY) 10 MG capsule, Take  by mouth., Disp: , Rfl:   •  levETIRAcetam (KEPPRA) 1000 MG tablet, Take 1 tablet by mouth 2 (Two) Times a Day., Disp: 60 tablet, Rfl: 5     Objective     History of Present Illness     Review of Systems    Constitutional: Negative.    HENT: Negative.    Eyes: Negative.    Respiratory: Positive for cough.    Cardiovascular: Negative.    Gastrointestinal: Negative.    Endocrine: Negative.    Genitourinary: Negative.    Musculoskeletal: Negative.    Allergic/Immunologic: Negative.    Neurological: Negative.    Hematological: Negative.    Psychiatric/Behavioral: Negative.        Physical Exam   Constitutional: He is oriented to person, place, and time. He appears well-developed and well-nourished.   Pleasant, cooperative no distress blood pressure 130/80   HENT:   Head: Normocephalic and atraumatic.   Eyes: Conjunctivae are normal. Pupils are equal, round, and reactive to light. No scleral icterus.   Neck: Normal range of motion. Neck supple.   Cardiovascular: Normal rate, regular rhythm and normal heart sounds.    Pulmonary/Chest: Effort normal and breath sounds normal. No respiratory distress. He has no wheezes. He has no rales.   Abdominal: Soft. Bowel sounds are normal.   Musculoskeletal: Normal range of motion. He exhibits tenderness. He exhibits no edema.   There is some tenderness on the palmar aspect of the right fifth knuckle but no erythema or contusions or obvious swelling.   Neurological: He is alert and oriented to person, place, and time.   Skin: Skin is warm and dry.   Psychiatric: He has a normal mood and affect. His behavior is normal.   Nursing note and vitals reviewed.      ASSESSMENT the patient continues to smoke and has not tried patches.  He has had no further seizures and is scheduled for laboratory testing and follow-up in several weeks or so.  He has had some persistent right hand pain and I reviewed x-rays of the right hand and can see no fracture.  Does not want to see a hand specialist currently.     Problem List Items Addressed This Visit        Nervous and Auditory    Epilepsy - Primary    Acute pain of left shoulder    Hand pain, right    Relevant Orders    XR Hand 3+ View Right (In  Office)       Other    Vertigo    Tobacco abuse          PLAN  The patient's already scheduled for laboratory testing and follow-up.    There are no Patient Instructions on file for this visit.  No Follow-up on file.

## 2018-02-23 DIAGNOSIS — G40.509 NONINTRACTABLE EPILEPSY DUE TO EXTERNAL CAUSES, WITHOUT STATUS EPILEPTICUS (HCC): ICD-10-CM

## 2018-02-23 DIAGNOSIS — Z13.220 SCREENING FOR CHOLESTEROL LEVEL: Primary | ICD-10-CM

## 2018-03-02 LAB
ALBUMIN SERPL-MCNC: 4.5 G/DL (ref 3.5–5.2)
ALBUMIN/GLOB SERPL: 2 G/DL
ALP SERPL-CCNC: 42 U/L (ref 39–117)
ALT SERPL-CCNC: 20 U/L (ref 1–41)
AST SERPL-CCNC: 17 U/L (ref 1–40)
BASOPHILS # BLD AUTO: 0.02 10*3/MM3 (ref 0–0.2)
BASOPHILS NFR BLD AUTO: 0.3 % (ref 0–1.5)
BILIRUB SERPL-MCNC: 0.4 MG/DL (ref 0.1–1.2)
BUN SERPL-MCNC: 10 MG/DL (ref 6–20)
BUN/CREAT SERPL: 10.8 (ref 7–25)
CALCIUM SERPL-MCNC: 9.3 MG/DL (ref 8.6–10.5)
CHLORIDE SERPL-SCNC: 105 MMOL/L (ref 98–107)
CHOLEST SERPL-MCNC: 198 MG/DL (ref 0–200)
CO2 SERPL-SCNC: 27.6 MMOL/L (ref 22–29)
CREAT SERPL-MCNC: 0.93 MG/DL (ref 0.76–1.27)
EOSINOPHIL # BLD AUTO: 0.37 10*3/MM3 (ref 0–0.7)
EOSINOPHIL NFR BLD AUTO: 5.3 % (ref 0.3–6.2)
ERYTHROCYTE [DISTWIDTH] IN BLOOD BY AUTOMATED COUNT: 13.4 % (ref 11.5–14.5)
GFR SERPLBLD CREATININE-BSD FMLA CKD-EPI: 113 ML/MIN/1.73
GFR SERPLBLD CREATININE-BSD FMLA CKD-EPI: 94 ML/MIN/1.73
GLOBULIN SER CALC-MCNC: 2.3 GM/DL
GLUCOSE SERPL-MCNC: 95 MG/DL (ref 65–99)
HCT VFR BLD AUTO: 50.7 % (ref 40.4–52.2)
HDLC SERPL-MCNC: 45 MG/DL (ref 40–60)
HGB BLD-MCNC: 16.8 G/DL (ref 13.7–17.6)
IMM GRANULOCYTES # BLD: 0.02 10*3/MM3 (ref 0–0.03)
IMM GRANULOCYTES NFR BLD: 0.3 % (ref 0–0.5)
LDLC SERPL CALC-MCNC: 138 MG/DL (ref 0–100)
LDLC/HDLC SERPL: 3.07 {RATIO}
LEVETIRACETAM SERPL-MCNC: 27.6 UG/ML (ref 10–40)
LYMPHOCYTES # BLD AUTO: 1.89 10*3/MM3 (ref 0.9–4.8)
LYMPHOCYTES NFR BLD AUTO: 27.1 % (ref 19.6–45.3)
MCH RBC QN AUTO: 31.6 PG (ref 27–32.7)
MCHC RBC AUTO-ENTMCNC: 33.1 G/DL (ref 32.6–36.4)
MCV RBC AUTO: 95.3 FL (ref 79.8–96.2)
MONOCYTES # BLD AUTO: 0.66 10*3/MM3 (ref 0.2–1.2)
MONOCYTES NFR BLD AUTO: 9.5 % (ref 5–12)
NEUTROPHILS # BLD AUTO: 4.02 10*3/MM3 (ref 1.9–8.1)
NEUTROPHILS NFR BLD AUTO: 57.5 % (ref 42.7–76)
PLATELET # BLD AUTO: 281 10*3/MM3 (ref 140–500)
POTASSIUM SERPL-SCNC: 4.7 MMOL/L (ref 3.5–5.2)
PROT SERPL-MCNC: 6.8 G/DL (ref 6–8.5)
RBC # BLD AUTO: 5.32 10*6/MM3 (ref 4.6–6)
SODIUM SERPL-SCNC: 144 MMOL/L (ref 136–145)
TRIGL SERPL-MCNC: 75 MG/DL (ref 0–150)
VLDLC SERPL CALC-MCNC: 15 MG/DL (ref 5–40)
WBC # BLD AUTO: 6.98 10*3/MM3 (ref 4.5–10.7)

## 2018-03-16 ENCOUNTER — OFFICE VISIT (OUTPATIENT)
Dept: FAMILY MEDICINE CLINIC | Facility: CLINIC | Age: 33
End: 2018-03-16

## 2018-03-16 VITALS
BODY MASS INDEX: 31.74 KG/M2 | HEIGHT: 67 IN | HEART RATE: 65 BPM | DIASTOLIC BLOOD PRESSURE: 78 MMHG | WEIGHT: 202.2 LBS | RESPIRATION RATE: 16 BRPM | OXYGEN SATURATION: 98 % | TEMPERATURE: 98.6 F | SYSTOLIC BLOOD PRESSURE: 110 MMHG

## 2018-03-16 DIAGNOSIS — G40.509 NONINTRACTABLE EPILEPSY DUE TO EXTERNAL CAUSES, WITHOUT STATUS EPILEPTICUS (HCC): Primary | ICD-10-CM

## 2018-03-16 DIAGNOSIS — E78.5 HYPERLIPIDEMIA, UNSPECIFIED HYPERLIPIDEMIA TYPE: ICD-10-CM

## 2018-03-16 DIAGNOSIS — Z72.0 TOBACCO ABUSE: ICD-10-CM

## 2018-03-16 PROBLEM — B35.3 TINEA PEDIS OF BOTH FEET: Status: ACTIVE | Noted: 2018-03-16

## 2018-03-16 PROCEDURE — 99214 OFFICE O/P EST MOD 30 MIN: CPT | Performed by: INTERNAL MEDICINE

## 2018-03-16 RX ORDER — TERBINAFINE HYDROCHLORIDE 250 MG/1
250 TABLET ORAL DAILY
Qty: 30 TABLET | Refills: 1 | Status: SHIPPED | OUTPATIENT
Start: 2018-03-16 | End: 2019-05-09

## 2018-03-16 RX ORDER — LEVETIRACETAM 1000 MG/1
1000 TABLET ORAL EVERY 12 HOURS SCHEDULED
Qty: 60 TABLET | Refills: 11 | Status: SHIPPED | OUTPATIENT
Start: 2018-03-16 | End: 2019-05-09 | Stop reason: SDUPTHER

## 2018-03-16 NOTE — PROGRESS NOTES
Subjective   Garrick Guo is a 32 y.o. male. Patient is here today for follow-up on his hyperlipidemia.  He also has epilepsy but has not had any seizures.  Finally he is complaining of some scaling and itching on his feet especially consistent with tinea.  Otherwise he's been stable and is had no chest pain, shortness of breath, edema or myalgias.  He continues to smoke but has not had much reflux symptoms.  Chief Complaint   Patient presents with   • Follow-up     epilepsy           Vitals:    03/16/18 0915   BP: 110/78   Pulse: 65   Resp: 16   Temp: 98.6 °F (37 °C)   SpO2: 98%     The following portions of the patient's history were reviewed and updated as appropriate: allergies, current medications, past family history, past medical history, past social history, past surgical history and problem list.    Past Medical History:   Diagnosis Date   • Acute sinusitis    • ADD (attention deficit disorder)    • Carpal tunnel syndrome    • Chronic bronchitis    • Viral URI       Allergies   Allergen Reactions   • Amoxicillin    • Doxycycline Other (See Comments)     Seizure/memory loss      Social History     Social History   • Marital status:      Spouse name: N/A   • Number of children: N/A   • Years of education: N/A     Occupational History   • Not on file.     Social History Main Topics   • Smoking status: Former Smoker   • Smokeless tobacco: Former User   • Alcohol use No   • Drug use: No   • Sexual activity: Not Currently     Other Topics Concern   • Not on file     Social History Narrative   • No narrative on file        Current Outpatient Prescriptions:   •  Cetirizine HCl (ZYRTEC ALLERGY) 10 MG capsule, Take  by mouth., Disp: , Rfl:   •  levETIRAcetam (KEPPRA) 1000 MG tablet, Take 1 tablet by mouth Every 12 (Twelve) Hours., Disp: 60 tablet, Rfl: 11  •  terbinafine (lamiSIL) 250 MG tablet, Take 1 tablet by mouth Daily., Disp: 30 tablet, Rfl: 1     Objective     History of Present Illness      Review of Systems   Constitutional: Negative.    HENT: Negative.    Eyes: Negative.    Respiratory: Positive for cough.    Cardiovascular: Negative.    Gastrointestinal: Negative.    Endocrine: Negative.    Genitourinary: Negative.    Musculoskeletal: Negative.    Skin: Positive for rash.   Allergic/Immunologic: Negative.    Neurological: Negative.    Psychiatric/Behavioral: Negative.        Physical Exam   Constitutional: He is oriented to person, place, and time. He appears well-developed and well-nourished.   Pleasant, cooperative no distress blood pressure 120/80   HENT:   Head: Normocephalic and atraumatic.   Eyes: Conjunctivae are normal. Pupils are equal, round, and reactive to light. No scleral icterus.   Neck: Normal range of motion. Neck supple.   Cardiovascular: Normal rate, regular rhythm and normal heart sounds.    Pulmonary/Chest: Effort normal and breath sounds normal. No respiratory distress. He has no wheezes. He has no rales.   Musculoskeletal: Normal range of motion.   Neurological: He is alert and oriented to person, place, and time.   Skin: Skin is warm and dry.   Tinea pedis   Psychiatric: He has a normal mood and affect. His behavior is normal.   Nursing note and vitals reviewed.      ASSESSMENT  CBC and CMP were completely normal and Keppra level was therapeutic.  His lipid panel had a slightly elevated LDL of 138 and otherwise was normal.  #1-hyperlipidemia, we'll try diet control   #2-epilepsy with no recent seizures  #3-tinea pedis  #4-tobacco abuse       Problem List Items Addressed This Visit        Cardiovascular and Mediastinum    Hyperlipidemia       Nervous and Auditory    Epilepsy - Primary    Relevant Medications    levETIRAcetam (KEPPRA) 1000 MG tablet       Other    Tobacco abuse      Other Visit Diagnoses    None.         PLAN  The patient will continue current medicines as now.  I'm going to try him on some terbinafine 250 mg daily.  I like to reschedule him in 4 months  with a CMP, lipid panel, Keppra level and TSH    There are no Patient Instructions on file for this visit.  Return in about 4 months (around 7/16/2018) for with labs.

## 2018-07-06 DIAGNOSIS — G40.509 NONINTRACTABLE EPILEPSY DUE TO EXTERNAL CAUSES, WITHOUT STATUS EPILEPTICUS (HCC): ICD-10-CM

## 2018-07-06 DIAGNOSIS — E78.5 HYPERLIPIDEMIA, UNSPECIFIED HYPERLIPIDEMIA TYPE: ICD-10-CM

## 2018-10-30 ENCOUNTER — OFFICE VISIT (OUTPATIENT)
Dept: FAMILY MEDICINE CLINIC | Facility: CLINIC | Age: 33
End: 2018-10-30

## 2018-10-30 VITALS
DIASTOLIC BLOOD PRESSURE: 70 MMHG | OXYGEN SATURATION: 97 % | HEIGHT: 67 IN | SYSTOLIC BLOOD PRESSURE: 104 MMHG | RESPIRATION RATE: 16 BRPM | BODY MASS INDEX: 30.86 KG/M2 | TEMPERATURE: 98.6 F | WEIGHT: 196.6 LBS | HEART RATE: 93 BPM

## 2018-10-30 DIAGNOSIS — J01.90 ACUTE RHINOSINUSITIS: ICD-10-CM

## 2018-10-30 DIAGNOSIS — Z72.0 TOBACCO ABUSE: ICD-10-CM

## 2018-10-30 DIAGNOSIS — R51.9 ACUTE NONINTRACTABLE HEADACHE, UNSPECIFIED HEADACHE TYPE: ICD-10-CM

## 2018-10-30 DIAGNOSIS — J20.9 ACUTE BRONCHITIS, UNSPECIFIED ORGANISM: Primary | ICD-10-CM

## 2018-10-30 PROCEDURE — 99213 OFFICE O/P EST LOW 20 MIN: CPT | Performed by: NURSE PRACTITIONER

## 2018-10-30 RX ORDER — DOXYCYCLINE 100 MG/1
TABLET ORAL
Refills: 0 | COMMUNITY
Start: 2018-10-24 | End: 2018-10-30 | Stop reason: ALTCHOICE

## 2018-10-30 RX ORDER — AZITHROMYCIN 250 MG/1
TABLET, FILM COATED ORAL
Qty: 6 TABLET | Refills: 0 | Status: SHIPPED | OUTPATIENT
Start: 2018-10-30 | End: 2018-11-06

## 2018-10-30 NOTE — PROGRESS NOTES
Subjective   Garrick Guo is a 32 y.o. male.   Chief Complaint   Patient presents with   • Cough     coughing up blood on day 5 of antibiotics    • Headache     Vitals:    10/30/18 0838   BP: 104/70   Pulse: 93   Resp: 16   Temp: 98.6 °F (37 °C)   SpO2: 97%     No LMP for male patient.    History of Present Illness  Garrick is a patient of Dr Emery who is here for an acute visit. This is a new problem to me. He was treated for acute bronchitis through his employers telemedicine doctor. He was given doxycycline which he states he is allergic to. He states he did not tell the telemedicine doctor is allergic to it because he forgot.  He is on day 5 of it. He states he has headaches and memory loss from it. He had some thick sputum that was brown and has some blood  Yesterday morning. He denies estrella hemoptysis and has not coughed up blood since. He has a headache that is moderate, occipital and frontal. He also has sinus congestion. He denies fever but has had fatigue and lethargy. His son has the same symptoms. He is a smoker.   The following portions of the patient's history were reviewed and updated as appropriate: allergies, current medications, past family history, past medical history, past social history, past surgical history and problem list.    Review of Systems   Constitutional: Positive for fatigue. Negative for chills and fever.   HENT: Positive for congestion, ear pain, postnasal drip and sore throat. Ear discharge: mild     Respiratory: Positive for cough and shortness of breath. Negative for chest tightness and wheezing.    Cardiovascular: Negative.    Gastrointestinal: Negative.    Neurological: Positive for weakness (generalized ) and headaches. Negative for dizziness, tremors and seizures.       Objective   Physical Exam   Constitutional: He is oriented to person, place, and time. Vital signs are normal. He appears well-developed and well-nourished. He appears ill. No distress.   He has  sunglasses on     HENT:   Head: Normocephalic.   Right Ear: Tympanic membrane normal.   Left Ear: Tympanic membrane normal.   Nose: Mucosal edema and rhinorrhea present.   Mouth/Throat: Uvula is midline. Posterior oropharyngeal erythema present.   Cardiovascular: Normal rate and regular rhythm.    Pulmonary/Chest: Effort normal. He has decreased breath sounds. He has no wheezes. He has no rhonchi. He has no rales.   Neurological: He is alert and oriented to person, place, and time.   Skin: Skin is warm, dry and intact.   Psychiatric:   Flat affect        Assessment/Plan   Garrick was seen today for cough and headache.    Diagnoses and all orders for this visit:    Acute bronchitis, unspecified organism    Acute rhinosinusitis    Acute nonintractable headache, unspecified headache type    Tobacco abuse    Other orders  -     azithromycin (ZITHROMAX Z-RAMIN) 250 MG tablet; Take 2 tablets the first day, then 1 tablet daily for 4 days.      Stop doxycycline, start zithromax tomorrow  Rest and fluids  Tylenol or ibuprofen  He cannot take flonase or nasacort as it causes headaches. He cannot tolerate sudafed.   Discussed getting a cbc and chest xray but he declined due to finances  Advised to go to the ER for lethargy, high fever, shortness of breath., CP, estrella hemoptysis,   Follow up next week with Dr Emery for a recheck

## 2018-11-06 ENCOUNTER — OFFICE VISIT (OUTPATIENT)
Dept: FAMILY MEDICINE CLINIC | Facility: CLINIC | Age: 33
End: 2018-11-06

## 2018-11-06 VITALS
BODY MASS INDEX: 30.61 KG/M2 | RESPIRATION RATE: 16 BRPM | OXYGEN SATURATION: 99 % | SYSTOLIC BLOOD PRESSURE: 110 MMHG | WEIGHT: 195 LBS | HEIGHT: 67 IN | HEART RATE: 91 BPM | DIASTOLIC BLOOD PRESSURE: 80 MMHG | TEMPERATURE: 99 F

## 2018-11-06 DIAGNOSIS — G40.509 NONINTRACTABLE EPILEPSY DUE TO EXTERNAL CAUSES, WITHOUT STATUS EPILEPTICUS (HCC): ICD-10-CM

## 2018-11-06 DIAGNOSIS — J01.90 ACUTE NON-RECURRENT SINUSITIS, UNSPECIFIED LOCATION: ICD-10-CM

## 2018-11-06 DIAGNOSIS — F41.8 MIXED ANXIETY AND DEPRESSIVE DISORDER: ICD-10-CM

## 2018-11-06 DIAGNOSIS — J40 BRONCHITIS: Primary | ICD-10-CM

## 2018-11-06 DIAGNOSIS — Z72.0 TOBACCO ABUSE: ICD-10-CM

## 2018-11-06 PROCEDURE — 99213 OFFICE O/P EST LOW 20 MIN: CPT | Performed by: INTERNAL MEDICINE

## 2018-11-06 RX ORDER — ESCITALOPRAM OXALATE 10 MG/1
10 TABLET ORAL DAILY
Qty: 30 TABLET | Refills: 1 | Status: SHIPPED | OUTPATIENT
Start: 2018-11-06 | End: 2019-01-02 | Stop reason: SDUPTHER

## 2018-11-06 NOTE — PROGRESS NOTES
Subjective   Garrick Guo is a 32 y.o. male. Patient is here today for follow-up on bronchitis.  He was seen by her nurse practitioner a week ago and put on a Z-Daniel.  He is much improved today and is having much less coughing, no pleurisy fevers or chills and just feeling better.  He is using some Flonase and Zyrtec.  His only new complaint is of some anxiety and depression and he does want to start on a medication for it.  He's had no seizures.  Chief Complaint   Patient presents with   • Bronchitis     follow up          Vitals:    11/06/18 0816   BP: 110/80   Pulse: 91   Resp: 16   Temp: 99 °F (37.2 °C)   SpO2: 99%     The following portions of the patient's history were reviewed and updated as appropriate: allergies, current medications, past family history, past medical history, past social history, past surgical history and problem list.    Past Medical History:   Diagnosis Date   • Acute sinusitis    • ADD (attention deficit disorder)    • Carpal tunnel syndrome    • Chronic bronchitis (CMS/HCC)    • Viral URI       Allergies   Allergen Reactions   • Amoxicillin    • Doxycycline Other (See Comments)     Seizure/memory loss      Social History     Social History   • Marital status:      Spouse name: N/A   • Number of children: N/A   • Years of education: N/A     Occupational History   • Not on file.     Social History Main Topics   • Smoking status: Former Smoker   • Smokeless tobacco: Former User   • Alcohol use No   • Drug use: No   • Sexual activity: Not Currently     Other Topics Concern   • Not on file     Social History Narrative   • No narrative on file        Current Outpatient Prescriptions:   •  Cetirizine HCl (ZYRTEC ALLERGY) 10 MG capsule, Take  by mouth., Disp: , Rfl:   •  escitalopram (LEXAPRO) 10 MG tablet, Take 1 tablet by mouth Daily., Disp: 30 tablet, Rfl: 1  •  levETIRAcetam (KEPPRA) 1000 MG tablet, Take 1 tablet by mouth Every 12 (Twelve) Hours., Disp: 60 tablet, Rfl: 11  •   terbinafine (lamiSIL) 250 MG tablet, Take 1 tablet by mouth Daily., Disp: 30 tablet, Rfl: 1     Objective     History of Present Illness     Review of Systems   Constitutional: Negative.    HENT: Negative.    Eyes: Negative.    Respiratory: Negative.    Cardiovascular: Negative.    Gastrointestinal: Negative.    Genitourinary: Negative.    Musculoskeletal: Negative.    Skin: Negative.    Neurological: Negative.    Psychiatric/Behavioral: Negative.  Negative for suicidal ideas.       Physical Exam   Constitutional: He is oriented to person, place, and time. He appears well-developed and well-nourished.   Pleasant, cooperative no distress blood pressure 120/80   HENT:   Head: Normocephalic and atraumatic.   Eyes: Pupils are equal, round, and reactive to light. Conjunctivae are normal. No scleral icterus.   Neck: Normal range of motion. Neck supple.   Cardiovascular: Normal rate, regular rhythm and normal heart sounds.    Pulmonary/Chest: Effort normal and breath sounds normal. No respiratory distress. He has no wheezes. He has no rales.   Musculoskeletal: Normal range of motion. He exhibits no edema.   Neurological: He is alert and oriented to person, place, and time.   Skin: Skin is warm and dry.   Psychiatric: He has a normal mood and affect. His behavior is normal.   Nursing note and vitals reviewed.      ASSESSMENT  the patient's bronchitis and sinusitis are much improved and he has completed the Z-Daniel.  #2-some depression and the patient requests an anti-suppressant.  He is not suicidal.  #3-epilepsy, no recent seizures  #4-tobacco use, decreasing using nicotine lozenges     Problem List Items Addressed This Visit        Nervous and Auditory    Epilepsy (CMS/HCC)       Other    Tobacco abuse    Mixed anxiety and depressive disorder    Relevant Medications    escitalopram (LEXAPRO) 10 MG tablet      Other Visit Diagnoses     Bronchitis    -  Primary    Acute non-recurrent sinusitis, unspecified location               PLAN  I'm going to start patient on Lexapro 10 mg daily.  I would like to recheck him in 2 months with a CBC, CMP, lipid panel and Keppra level    There are no Patient Instructions on file for this visit.  Return in about 2 months (around 1/6/2019).

## 2019-01-02 DIAGNOSIS — E78.5 HYPERLIPIDEMIA, UNSPECIFIED HYPERLIPIDEMIA TYPE: Primary | ICD-10-CM

## 2019-01-02 DIAGNOSIS — Z79.899 LONG TERM USE OF DRUG: ICD-10-CM

## 2019-01-02 RX ORDER — ESCITALOPRAM OXALATE 10 MG/1
TABLET ORAL
Qty: 30 TABLET | Refills: 0 | Status: SHIPPED | OUTPATIENT
Start: 2019-01-02 | End: 2019-02-08 | Stop reason: SDUPTHER

## 2019-02-08 RX ORDER — ESCITALOPRAM OXALATE 10 MG/1
TABLET ORAL
Qty: 30 TABLET | Refills: 0 | Status: SHIPPED | OUTPATIENT
Start: 2019-02-08 | End: 2019-03-11 | Stop reason: SDUPTHER

## 2019-03-12 RX ORDER — ESCITALOPRAM OXALATE 10 MG/1
TABLET ORAL
Qty: 30 TABLET | Refills: 0 | Status: SHIPPED | OUTPATIENT
Start: 2019-03-12 | End: 2019-04-12 | Stop reason: SDUPTHER

## 2019-04-12 RX ORDER — ESCITALOPRAM OXALATE 10 MG/1
10 TABLET ORAL DAILY
Qty: 30 TABLET | Refills: 0 | Status: SHIPPED | OUTPATIENT
Start: 2019-04-12 | End: 2019-05-09 | Stop reason: SDUPTHER

## 2019-05-09 ENCOUNTER — OFFICE VISIT (OUTPATIENT)
Dept: FAMILY MEDICINE CLINIC | Facility: CLINIC | Age: 34
End: 2019-05-09

## 2019-05-09 VITALS
HEIGHT: 67 IN | TEMPERATURE: 98.1 F | WEIGHT: 198 LBS | HEART RATE: 94 BPM | SYSTOLIC BLOOD PRESSURE: 120 MMHG | DIASTOLIC BLOOD PRESSURE: 80 MMHG | BODY MASS INDEX: 31.08 KG/M2 | RESPIRATION RATE: 15 BRPM | OXYGEN SATURATION: 98 %

## 2019-05-09 DIAGNOSIS — E78.5 HYPERLIPIDEMIA, UNSPECIFIED HYPERLIPIDEMIA TYPE: Primary | ICD-10-CM

## 2019-05-09 DIAGNOSIS — F41.8 MIXED ANXIETY AND DEPRESSIVE DISORDER: ICD-10-CM

## 2019-05-09 DIAGNOSIS — G44.89 OTHER HEADACHE SYNDROME: ICD-10-CM

## 2019-05-09 DIAGNOSIS — G40.509 NONINTRACTABLE EPILEPSY DUE TO EXTERNAL CAUSES, WITHOUT STATUS EPILEPTICUS (HCC): ICD-10-CM

## 2019-05-09 PROCEDURE — 99214 OFFICE O/P EST MOD 30 MIN: CPT | Performed by: INTERNAL MEDICINE

## 2019-05-09 RX ORDER — LEVETIRACETAM 1000 MG/1
1000 TABLET ORAL EVERY 12 HOURS SCHEDULED
Qty: 180 TABLET | Refills: 3 | Status: SHIPPED | OUTPATIENT
Start: 2019-05-09 | End: 2019-08-06 | Stop reason: SDUPTHER

## 2019-05-09 RX ORDER — ESCITALOPRAM OXALATE 10 MG/1
10 TABLET ORAL DAILY
Qty: 90 TABLET | Refills: 1 | Status: SHIPPED | OUTPATIENT
Start: 2019-05-09 | End: 2019-08-06 | Stop reason: SDUPTHER

## 2019-05-09 NOTE — PROGRESS NOTES
Subjective   Garrick Guo is a 33 y.o. male. Patient is here today for follow-up on his hyperlipidemia, history of epilepsy and anxiety and depression.  He is generally been stable.  His only new complaint is of some headaches.  They are variable and do seem to affect the eyes somewhat.  Says there is sometimes start after a coughing episode.  He can awaken with them on occasion.  He is usually been treating them with Tylenol or an over-the-counter anti-inflammatory.  He has had no seizures.  Chief Complaint   Patient presents with   • Seizures   • Headache     off  and on for 6mths          Vitals:    05/09/19 0828   BP: 120/80   Pulse: 94   Resp: 15   Temp: 98.1 °F (36.7 °C)   SpO2: 98%     The following portions of the patient's history were reviewed and updated as appropriate: allergies, current medications, past family history, past medical history, past social history, past surgical history and problem list.    Past Medical History:   Diagnosis Date   • Acute sinusitis    • ADD (attention deficit disorder)    • Carpal tunnel syndrome    • Chronic bronchitis (CMS/HCC)    • Viral URI       Allergies   Allergen Reactions   • Amoxicillin    • Doxycycline Other (See Comments)     Seizure/memory loss      Social History     Socioeconomic History   • Marital status:      Spouse name: Not on file   • Number of children: Not on file   • Years of education: Not on file   • Highest education level: Not on file   Tobacco Use   • Smoking status: Former Smoker   • Smokeless tobacco: Former User   Substance and Sexual Activity   • Alcohol use: No   • Drug use: No   • Sexual activity: Not Currently        Current Outpatient Medications:   •  escitalopram (LEXAPRO) 10 MG tablet, Take 1 tablet by mouth Daily. NO MORE REFILLS UNTIL LABS AND A FOLLOW UP PER DR ALVAREZ, Disp: 90 tablet, Rfl: 1  •  levETIRAcetam (KEPPRA) 1000 MG tablet, Take 1 tablet by mouth Every 12 (Twelve) Hours., Disp: 180 tablet, Rfl: 3      Objective     History of Present Illness     Review of Systems   Constitutional: Negative.    HENT: Negative.    Eyes: Negative.    Respiratory: Negative.    Cardiovascular: Negative.    Gastrointestinal: Negative.    Genitourinary: Negative.    Musculoskeletal: Negative.    Skin: Negative.    Neurological: Negative.    Psychiatric/Behavioral: Negative.        Physical Exam   Constitutional: He is oriented to person, place, and time. He appears well-developed and well-nourished.   Pleasant, cooperative no distress, blood pressure 120/80   HENT:   Head: Normocephalic and atraumatic.   Eyes: Conjunctivae are normal. Pupils are equal, round, and reactive to light. No scleral icterus.   Neck: Normal range of motion. Neck supple.   Cardiovascular: Normal rate, regular rhythm and normal heart sounds.   Pulmonary/Chest: Effort normal and breath sounds normal. No respiratory distress. He has no wheezes. He has no rales.   Musculoskeletal: Normal range of motion. He exhibits no edema.   Neurological: He is alert and oriented to person, place, and time.   Skin: Skin is warm and dry.   Psychiatric: He has a normal mood and affect. His behavior is normal.   Nursing note and vitals reviewed.      ASSESSMENT overall patient stable.  Blood pressure is normal.  Heart and lungs sound fine.  There are no new labs to see today.  #1-history of epilepsy with no recent seizures, on Keppra  #2-anxiety and depression, controlled on medication  #3-history of hyperlipidemia, no recent labs  #4-headaches, possibly either tension or migraine     Problem List Items Addressed This Visit        Cardiovascular and Mediastinum    Hyperlipidemia - Primary       Nervous and Auditory    Epilepsy (CMS/HCC)    Relevant Medications    levETIRAcetam (KEPPRA) 1000 MG tablet       Other    Mixed anxiety and depressive disorder    Relevant Medications    escitalopram (LEXAPRO) 10 MG tablet          PLAN I refilled patient's medications.  He can try some  Excedrin Migraine and see how that does.  I like to recheck him in 3 months with a CBC, CMP, lipid panel, Keppra level and TSH    There are no Patient Instructions on file for this visit.  Return in about 3 months (around 8/9/2019) for with labs.

## 2019-07-25 DIAGNOSIS — E78.5 HYPERLIPIDEMIA, UNSPECIFIED HYPERLIPIDEMIA TYPE: ICD-10-CM

## 2019-07-25 DIAGNOSIS — G40.509 NONINTRACTABLE EPILEPSY DUE TO EXTERNAL CAUSES, WITHOUT STATUS EPILEPTICUS (HCC): ICD-10-CM

## 2019-08-02 LAB
ALBUMIN SERPL-MCNC: 4.2 G/DL (ref 3.5–5.2)
ALBUMIN/GLOB SERPL: 1.8 G/DL
ALP SERPL-CCNC: 46 U/L (ref 39–117)
ALT SERPL-CCNC: 67 U/L (ref 1–41)
AST SERPL-CCNC: 50 U/L (ref 1–40)
BASOPHILS # BLD AUTO: 0.04 10*3/MM3 (ref 0–0.2)
BASOPHILS NFR BLD AUTO: 0.5 % (ref 0–1.5)
BILIRUB SERPL-MCNC: 0.5 MG/DL (ref 0.2–1.2)
BUN SERPL-MCNC: 18 MG/DL (ref 6–20)
BUN/CREAT SERPL: 19.8 (ref 7–25)
CALCIUM SERPL-MCNC: 9.2 MG/DL (ref 8.6–10.5)
CHLORIDE SERPL-SCNC: 101 MMOL/L (ref 98–107)
CHOLEST SERPL-MCNC: 212 MG/DL (ref 0–200)
CO2 SERPL-SCNC: 22.1 MMOL/L (ref 22–29)
CREAT SERPL-MCNC: 0.91 MG/DL (ref 0.76–1.27)
EOSINOPHIL # BLD AUTO: 0.23 10*3/MM3 (ref 0–0.4)
EOSINOPHIL NFR BLD AUTO: 2.8 % (ref 0.3–6.2)
ERYTHROCYTE [DISTWIDTH] IN BLOOD BY AUTOMATED COUNT: 14.4 % (ref 12.3–15.4)
GLOBULIN SER CALC-MCNC: 2.4 GM/DL
GLUCOSE SERPL-MCNC: 85 MG/DL (ref 65–99)
HCT VFR BLD AUTO: 51.6 % (ref 37.5–51)
HDLC SERPL-MCNC: 39 MG/DL (ref 40–60)
HGB BLD-MCNC: 16.3 G/DL (ref 13–17.7)
IMM GRANULOCYTES # BLD AUTO: 0.03 10*3/MM3 (ref 0–0.05)
IMM GRANULOCYTES NFR BLD AUTO: 0.4 % (ref 0–0.5)
LDLC SERPL CALC-MCNC: 156 MG/DL (ref 0–100)
LDLC/HDLC SERPL: 4.01 {RATIO}
LEVETIRACETAM SERPL-MCNC: 17.7 UG/ML (ref 10–40)
LYMPHOCYTES # BLD AUTO: 1.51 10*3/MM3 (ref 0.7–3.1)
LYMPHOCYTES NFR BLD AUTO: 18.7 % (ref 19.6–45.3)
MCH RBC QN AUTO: 29 PG (ref 26.6–33)
MCHC RBC AUTO-ENTMCNC: 31.6 G/DL (ref 31.5–35.7)
MCV RBC AUTO: 91.8 FL (ref 79–97)
MONOCYTES # BLD AUTO: 0.82 10*3/MM3 (ref 0.1–0.9)
MONOCYTES NFR BLD AUTO: 10.1 % (ref 5–12)
NEUTROPHILS # BLD AUTO: 5.46 10*3/MM3 (ref 1.7–7)
NEUTROPHILS NFR BLD AUTO: 67.5 % (ref 42.7–76)
NRBC BLD AUTO-RTO: 0 /100 WBC (ref 0–0.2)
PLATELET # BLD AUTO: 340 10*3/MM3 (ref 140–450)
POTASSIUM SERPL-SCNC: 4.8 MMOL/L (ref 3.5–5.2)
PROT SERPL-MCNC: 6.6 G/DL (ref 6–8.5)
RBC # BLD AUTO: 5.62 10*6/MM3 (ref 4.14–5.8)
SODIUM SERPL-SCNC: 139 MMOL/L (ref 136–145)
TRIGL SERPL-MCNC: 83 MG/DL (ref 0–150)
TSH SERPL DL<=0.005 MIU/L-ACNC: 1.78 MIU/ML (ref 0.27–4.2)
VLDLC SERPL CALC-MCNC: 16.6 MG/DL
WBC # BLD AUTO: 8.09 10*3/MM3 (ref 3.4–10.8)

## 2019-08-06 ENCOUNTER — OFFICE VISIT (OUTPATIENT)
Dept: FAMILY MEDICINE CLINIC | Facility: CLINIC | Age: 34
End: 2019-08-06

## 2019-08-06 VITALS
HEART RATE: 58 BPM | SYSTOLIC BLOOD PRESSURE: 110 MMHG | DIASTOLIC BLOOD PRESSURE: 74 MMHG | TEMPERATURE: 98.5 F | OXYGEN SATURATION: 98 % | WEIGHT: 188.4 LBS | BODY MASS INDEX: 29.57 KG/M2 | HEIGHT: 67 IN | RESPIRATION RATE: 16 BRPM

## 2019-08-06 DIAGNOSIS — G40.509 NONINTRACTABLE EPILEPSY DUE TO EXTERNAL CAUSES, WITHOUT STATUS EPILEPTICUS (HCC): Primary | ICD-10-CM

## 2019-08-06 DIAGNOSIS — E78.5 HYPERLIPIDEMIA, UNSPECIFIED HYPERLIPIDEMIA TYPE: ICD-10-CM

## 2019-08-06 DIAGNOSIS — F41.8 MIXED ANXIETY AND DEPRESSIVE DISORDER: ICD-10-CM

## 2019-08-06 PROCEDURE — 99214 OFFICE O/P EST MOD 30 MIN: CPT | Performed by: INTERNAL MEDICINE

## 2019-08-06 RX ORDER — LEVETIRACETAM 1000 MG/1
1000 TABLET ORAL EVERY 12 HOURS SCHEDULED
Qty: 180 TABLET | Refills: 3 | Status: SHIPPED | OUTPATIENT
Start: 2019-08-06 | End: 2019-08-06 | Stop reason: SDUPTHER

## 2019-08-06 RX ORDER — ESCITALOPRAM OXALATE 10 MG/1
10 TABLET ORAL DAILY
Qty: 90 TABLET | Refills: 1 | Status: SHIPPED | OUTPATIENT
Start: 2019-08-06 | End: 2019-08-06 | Stop reason: SDUPTHER

## 2019-08-06 RX ORDER — LEVETIRACETAM 1000 MG/1
1000 TABLET ORAL EVERY 12 HOURS SCHEDULED
Qty: 180 TABLET | Refills: 3 | Status: SHIPPED | OUTPATIENT
Start: 2019-08-06 | End: 2020-07-21

## 2019-08-06 RX ORDER — ESCITALOPRAM OXALATE 10 MG/1
10 TABLET ORAL DAILY
Qty: 90 TABLET | Refills: 1 | Status: SHIPPED | OUTPATIENT
Start: 2019-08-06 | End: 2020-01-06 | Stop reason: SDUPTHER

## 2019-08-06 NOTE — PROGRESS NOTES
Subjective   Garrick Guo is a 33 y.o. male. Patient is here today for follow-up on his hyperlipidemia, history of epilepsy, anxiety and depression.  He is generally been doing okay and denies any recent seizures.  He is exercising regularly and has lost some weight and looks very healthy.  He has anxiety and depression are controlled well on the Lexapro.  Chief Complaint   Patient presents with   • Hyperlipidemia     EPILEPSY- FOLLOW UP LABS          Vitals:    08/06/19 1015   BP: 110/74   Pulse: 58   Resp: 16   Temp: 98.5 °F (36.9 °C)   SpO2: 98%     The following portions of the patient's history were reviewed and updated as appropriate: allergies, current medications, past family history, past medical history, past social history, past surgical history and problem list.    Past Medical History:   Diagnosis Date   • Acute sinusitis    • ADD (attention deficit disorder)    • Carpal tunnel syndrome    • Chronic bronchitis (CMS/HCC)    • Viral URI       Allergies   Allergen Reactions   • Amoxicillin    • Doxycycline Other (See Comments)     Seizure/memory loss      Social History     Socioeconomic History   • Marital status:      Spouse name: Not on file   • Number of children: Not on file   • Years of education: Not on file   • Highest education level: Not on file   Tobacco Use   • Smoking status: Former Smoker   • Smokeless tobacco: Former User   Substance and Sexual Activity   • Alcohol use: No   • Drug use: No   • Sexual activity: Not Currently        Current Outpatient Medications:   •  escitalopram (LEXAPRO) 10 MG tablet, Take 1 tablet by mouth Daily. NO MORE REFILLS UNTIL LABS AND A FOLLOW UP PER DR ALVAREZ, Disp: 90 tablet, Rfl: 1  •  levETIRAcetam (KEPPRA) 1000 MG tablet, Take 1 tablet by mouth Every 12 (Twelve) Hours., Disp: 180 tablet, Rfl: 3     Objective     History of Present Illness     Review of Systems   Constitutional: Negative.    HENT: Negative.    Eyes: Negative.    Respiratory:  Negative.    Cardiovascular: Negative.    Gastrointestinal: Negative.    Genitourinary: Negative.    Musculoskeletal: Negative.    Skin: Negative.    Neurological: Negative.    Psychiatric/Behavioral: Negative.        Physical Exam   Constitutional: He is oriented to person, place, and time. He appears well-developed and well-nourished.   Pleasant, cooperative no acute distress, blood pressure 110/70   HENT:   Head: Normocephalic and atraumatic.   Eyes: Conjunctivae are normal. Pupils are equal, round, and reactive to light. No scleral icterus.   Neck: Normal range of motion. Neck supple.   Cardiovascular: Normal rate, regular rhythm and normal heart sounds.   Pulmonary/Chest: Effort normal and breath sounds normal. No respiratory distress. He has no wheezes. He has no rales.   Musculoskeletal: Normal range of motion. He exhibits no edema.   Neurological: He is alert and oriented to person, place, and time.   Skin: Skin is warm and dry.   Psychiatric: He has a normal mood and affect. His behavior is normal.   Nursing note and vitals reviewed.      ASSESSMENT CBC is essentially normal.  CMP is normal aside from minimal elevation of AST and ALT of 50 and 67 that are stable.  Lipid panel was elevated with total cholesterol 212, HDL 39, .  TSH was quite normal and Keppra level was therapeutic  #1-hyperlipidemia, asymptomatic and will try diet control  #2-history of epilepsy, stable on Keppra dose  #3-anxiety and depression, controlled on Lexapro     Problem List Items Addressed This Visit        Cardiovascular and Mediastinum    Hyperlipidemia       Nervous and Auditory    Epilepsy (CMS/HCC) - Primary    Relevant Medications    levETIRAcetam (KEPPRA) 1000 MG tablet       Other    Mixed anxiety and depressive disorder    Relevant Medications    escitalopram (LEXAPRO) 10 MG tablet          PLAN I refilled the patient's medications.  He will try and watch dietary fats and I would like to recheck him in 6 months with  a CMP, lipid panel, Keppra level    There are no Patient Instructions on file for this visit.  Return in about 6 months (around 2/6/2020) for with labs.

## 2020-01-06 RX ORDER — ESCITALOPRAM OXALATE 10 MG/1
10 TABLET ORAL DAILY
Qty: 90 TABLET | Refills: 0 | Status: SHIPPED | OUTPATIENT
Start: 2020-01-06 | End: 2020-07-31

## 2020-01-31 DIAGNOSIS — G40.509 NONINTRACTABLE EPILEPSY DUE TO EXTERNAL CAUSES, WITHOUT STATUS EPILEPTICUS (HCC): ICD-10-CM

## 2020-01-31 DIAGNOSIS — E78.5 HYPERLIPIDEMIA, UNSPECIFIED HYPERLIPIDEMIA TYPE: ICD-10-CM

## 2020-07-21 RX ORDER — LEVETIRACETAM 1000 MG/1
TABLET ORAL
Qty: 60 TABLET | Refills: 0 | Status: SHIPPED | OUTPATIENT
Start: 2020-07-21 | End: 2020-07-31 | Stop reason: SDUPTHER

## 2020-07-31 ENCOUNTER — OFFICE VISIT (OUTPATIENT)
Dept: FAMILY MEDICINE CLINIC | Facility: CLINIC | Age: 35
End: 2020-07-31

## 2020-07-31 VITALS
SYSTOLIC BLOOD PRESSURE: 122 MMHG | TEMPERATURE: 98.4 F | BODY MASS INDEX: 28.97 KG/M2 | DIASTOLIC BLOOD PRESSURE: 78 MMHG | HEIGHT: 67 IN | RESPIRATION RATE: 18 BRPM | OXYGEN SATURATION: 98 % | WEIGHT: 184.6 LBS | HEART RATE: 74 BPM

## 2020-07-31 DIAGNOSIS — E78.5 HYPERLIPIDEMIA, UNSPECIFIED HYPERLIPIDEMIA TYPE: Primary | ICD-10-CM

## 2020-07-31 DIAGNOSIS — F41.8 MIXED ANXIETY AND DEPRESSIVE DISORDER: ICD-10-CM

## 2020-07-31 DIAGNOSIS — G40.509 NONINTRACTABLE EPILEPSY DUE TO EXTERNAL CAUSES, WITHOUT STATUS EPILEPTICUS (HCC): ICD-10-CM

## 2020-07-31 PROCEDURE — 99213 OFFICE O/P EST LOW 20 MIN: CPT | Performed by: INTERNAL MEDICINE

## 2020-07-31 RX ORDER — LEVETIRACETAM 1000 MG/1
1000 TABLET ORAL EVERY 12 HOURS
Qty: 180 TABLET | Refills: 2 | Status: SHIPPED | OUTPATIENT
Start: 2020-07-31 | End: 2021-05-21

## 2020-07-31 NOTE — PROGRESS NOTES
Subjective   Garrick Guo is a 34 y.o. male. Patient is here today for follow-up on hyperlipidemia, history of epilepsy and anxiety and depression.  He is generally been stable but has not had any laboratory studies in about a year.  He tells me he is not had any seizures and is tolerating Keppra without any problems.  Chief Complaint   Patient presents with   • Seizures     PT HERE FOR MED CHECK          Vitals:    07/31/20 1052   BP: 122/78   Pulse: 74   Resp: 18   Temp: 98.4 °F (36.9 °C)   SpO2: 98%     Body mass index is 28.91 kg/m².  The following portions of the patient's history were reviewed and updated as appropriate: allergies, current medications, past family history, past medical history, past social history, past surgical history and problem list.    Past Medical History:   Diagnosis Date   • Acute sinusitis    • ADD (attention deficit disorder)    • Carpal tunnel syndrome    • Chronic bronchitis (CMS/HCC)    • Viral URI       Allergies   Allergen Reactions   • Amoxicillin    • Doxycycline Other (See Comments)     Seizure/memory loss      Social History     Socioeconomic History   • Marital status:      Spouse name: Not on file   • Number of children: Not on file   • Years of education: Not on file   • Highest education level: Not on file   Tobacco Use   • Smoking status: Former Smoker   • Smokeless tobacco: Former User   Substance and Sexual Activity   • Alcohol use: No   • Drug use: No   • Sexual activity: Not Currently        Current Outpatient Medications:   •  levETIRAcetam (KEPPRA) 1000 MG tablet, Take 1 tablet by mouth Every 12 (Twelve) Hours., Disp: 180 tablet, Rfl: 2     Objective     History of Present Illness     Review of Systems   Constitutional: Negative.    HENT: Negative.    Respiratory: Negative.    Cardiovascular: Negative.    Gastrointestinal: Negative.    Genitourinary: Negative.    Musculoskeletal: Negative.    Skin: Negative.    Neurological: Negative.     Psychiatric/Behavioral: Negative.        Physical Exam   Constitutional: He is oriented to person, place, and time. He appears well-developed and well-nourished.   Pleasant, cooperative no acute distress and fit appearing, blood pressure 120/80   HENT:   Head: Normocephalic and atraumatic.   Eyes: Conjunctivae are normal. No scleral icterus.   Neck: Normal range of motion. Neck supple.   Cardiovascular: Normal rate, regular rhythm and normal heart sounds.   Pulmonary/Chest: Effort normal and breath sounds normal. No respiratory distress. He has no wheezes. He has no rales.   Musculoskeletal: Normal range of motion. He exhibits no edema.   Neurological: He is alert and oriented to person, place, and time.   Skin: Skin is warm and dry.   Psychiatric: He has a normal mood and affect. His behavior is normal.   Nursing note and vitals reviewed.      ASSESSMENT overall stable  #1-hyperlipidemia, asymptomatic  #2-history of seizure disorder, no recent seizures     Problem List Items Addressed This Visit        Cardiovascular and Mediastinum    Hyperlipidemia - Primary    Relevant Orders    CBC & Differential    HCV Antibody Reflex To RODNEY    Comprehensive Metabolic Panel    Lipid Panel With LDL / HDL Ratio       Nervous and Auditory    Epilepsy (CMS/HCC)    Relevant Medications    levETIRAcetam (KEPPRA) 1000 MG tablet    Other Relevant Orders    CBC & Differential    HCV Antibody Reflex To RODNEY    Levetiracetam Level (Keppra)       Other    Mixed anxiety and depressive disorder          PLAN I have ordered laboratory studies to be drawn on the patient today and refilled his Keppra.  I recommended he get a flu shot in October.  I would like to recheck the patient in 6 months with a CMP, lipid panel, TSH    There are no Patient Instructions on file for this visit.  Return in about 6 months (around 1/31/2021) for with labs.

## 2020-08-01 LAB
BASOPHILS # BLD AUTO: 0.06 10*3/MM3 (ref 0–0.2)
BASOPHILS NFR BLD AUTO: 0.7 % (ref 0–1.5)
EOSINOPHIL # BLD AUTO: 0.3 10*3/MM3 (ref 0–0.4)
EOSINOPHIL NFR BLD AUTO: 3.5 % (ref 0.3–6.2)
ERYTHROCYTE [DISTWIDTH] IN BLOOD BY AUTOMATED COUNT: 15.9 % (ref 12.3–15.4)
HCT VFR BLD AUTO: 54.2 % (ref 37.5–51)
HCV AB S/CO SERPL IA: <0.1 S/CO RATIO (ref 0–0.9)
HCV AB SERPL QL IA: NORMAL
HGB BLD-MCNC: 17.9 G/DL (ref 13–17.7)
IMM GRANULOCYTES # BLD AUTO: 0.03 10*3/MM3 (ref 0–0.05)
IMM GRANULOCYTES NFR BLD AUTO: 0.3 % (ref 0–0.5)
LYMPHOCYTES # BLD AUTO: 1.6 10*3/MM3 (ref 0.7–3.1)
LYMPHOCYTES NFR BLD AUTO: 18.6 % (ref 19.6–45.3)
MCH RBC QN AUTO: 30.2 PG (ref 26.6–33)
MCHC RBC AUTO-ENTMCNC: 33 G/DL (ref 31.5–35.7)
MCV RBC AUTO: 91.6 FL (ref 79–97)
MONOCYTES # BLD AUTO: 0.89 10*3/MM3 (ref 0.1–0.9)
MONOCYTES NFR BLD AUTO: 10.3 % (ref 5–12)
NEUTROPHILS # BLD AUTO: 5.72 10*3/MM3 (ref 1.7–7)
NEUTROPHILS NFR BLD AUTO: 66.6 % (ref 42.7–76)
NRBC BLD AUTO-RTO: 0 /100 WBC (ref 0–0.2)
PLATELET # BLD AUTO: 278 10*3/MM3 (ref 140–450)
RBC # BLD AUTO: 5.92 10*6/MM3 (ref 4.14–5.8)
WBC # BLD AUTO: 8.6 10*3/MM3 (ref 3.4–10.8)

## 2020-08-05 ENCOUNTER — RESULTS ENCOUNTER (OUTPATIENT)
Dept: FAMILY MEDICINE CLINIC | Facility: CLINIC | Age: 35
End: 2020-08-05

## 2020-08-05 DIAGNOSIS — G40.509 NONINTRACTABLE EPILEPSY DUE TO EXTERNAL CAUSES, WITHOUT STATUS EPILEPTICUS (HCC): ICD-10-CM

## 2020-08-05 DIAGNOSIS — E78.5 HYPERLIPIDEMIA, UNSPECIFIED HYPERLIPIDEMIA TYPE: ICD-10-CM

## 2021-01-20 ENCOUNTER — OFFICE VISIT (OUTPATIENT)
Dept: FAMILY MEDICINE CLINIC | Facility: CLINIC | Age: 36
End: 2021-01-20

## 2021-01-20 VITALS
HEIGHT: 67 IN | SYSTOLIC BLOOD PRESSURE: 138 MMHG | HEART RATE: 69 BPM | TEMPERATURE: 97.1 F | DIASTOLIC BLOOD PRESSURE: 80 MMHG | BODY MASS INDEX: 30.32 KG/M2 | RESPIRATION RATE: 17 BRPM | WEIGHT: 193.2 LBS | OXYGEN SATURATION: 100 %

## 2021-01-20 DIAGNOSIS — M54.50 ACUTE BILATERAL LOW BACK PAIN WITHOUT SCIATICA: Primary | ICD-10-CM

## 2021-01-20 DIAGNOSIS — R10.30 LOWER ABDOMINAL PAIN: ICD-10-CM

## 2021-01-20 LAB
BILIRUB BLD-MCNC: NEGATIVE MG/DL
CLARITY, POC: CLEAR
COLOR UR: YELLOW
GLUCOSE UR STRIP-MCNC: NEGATIVE MG/DL
KETONES UR QL: NEGATIVE
LEUKOCYTE EST, POC: NEGATIVE
NITRITE UR-MCNC: NEGATIVE MG/ML
PH UR: 7 [PH] (ref 5–8)
PROT UR STRIP-MCNC: NEGATIVE MG/DL
RBC # UR STRIP: NEGATIVE /UL
SP GR UR: 1.03 (ref 1–1.03)
UROBILINOGEN UR QL: NORMAL

## 2021-01-20 PROCEDURE — 81003 URINALYSIS AUTO W/O SCOPE: CPT | Performed by: INTERNAL MEDICINE

## 2021-01-20 PROCEDURE — 99214 OFFICE O/P EST MOD 30 MIN: CPT | Performed by: INTERNAL MEDICINE

## 2021-01-20 NOTE — PROGRESS NOTES
Subjective   Garrick Guo is a 35 y.o. male. Patient is here today for complaints of abdominal pain that is generalized at times but seems to be more so on the lower abdomen and occasionally has pain running down into his right groin and inguinal area.  He also has some low back pain that radiates around the flank down toward the groin area.  He denies any fevers chills and has had no nausea vomiting or change in bowels.  He also feels his right leg is a bit weaker than the left currently  Chief Complaint   Patient presents with   • Abdominal Pain   • Groin Pain          Vitals:    01/20/21 0917   BP: 138/80   Pulse: 69   Resp: 17   Temp: 97.1 °F (36.2 °C)   SpO2: 100%     Body mass index is 30.25 kg/m².  The following portions of the patient's history were reviewed and updated as appropriate: allergies, current medications, past family history, past medical history, past social history, past surgical history and problem list.    Past Medical History:   Diagnosis Date   • Acute sinusitis    • ADD (attention deficit disorder)    • Carpal tunnel syndrome    • Chronic bronchitis (CMS/HCC)    • Viral URI       Allergies   Allergen Reactions   • Amoxicillin    • Doxycycline Other (See Comments)     Seizure/memory loss      Social History     Socioeconomic History   • Marital status:      Spouse name: Not on file   • Number of children: Not on file   • Years of education: Not on file   • Highest education level: Not on file   Tobacco Use   • Smoking status: Former Smoker   • Smokeless tobacco: Former User   Substance and Sexual Activity   • Alcohol use: No   • Drug use: No   • Sexual activity: Not Currently        Current Outpatient Medications:   •  levETIRAcetam (KEPPRA) 1000 MG tablet, Take 1 tablet by mouth Every 12 (Twelve) Hours., Disp: 180 tablet, Rfl: 2     Objective     History of Present Illness     Review of Systems   Constitutional: Negative.    HENT: Negative.    Eyes: Negative.     Respiratory: Negative.    Cardiovascular: Negative.    Gastrointestinal: Positive for abdominal pain.   Genitourinary: Negative.    Musculoskeletal: Positive for back pain.   Skin: Negative.    Neurological: Negative.    Hematological: Negative.    Psychiatric/Behavioral: Negative.        Physical Exam  Vitals signs and nursing note reviewed.   Constitutional:       General: He is in acute distress.      Appearance: Normal appearance. He is not ill-appearing.   HENT:      Head: Normocephalic and atraumatic.   Eyes:      General: No scleral icterus.     Conjunctiva/sclera: Conjunctivae normal.   Neck:      Musculoskeletal: Normal range of motion and neck supple.   Cardiovascular:      Rate and Rhythm: Normal rate and regular rhythm.      Heart sounds: Normal heart sounds.   Pulmonary:      Effort: Pulmonary effort is normal. No respiratory distress.      Breath sounds: Normal breath sounds. No wheezing or rales.   Abdominal:      General: Abdomen is flat. Bowel sounds are normal. There is no distension.      Palpations: Abdomen is soft. There is no mass.      Tenderness: There is no abdominal tenderness. There is no left CVA tenderness, guarding or rebound.      Hernia: No hernia is present.   Genitourinary:     Penis: Normal.       Scrotum/Testes: Normal.      Comments: No inguinal hernia appreciated  Musculoskeletal: Normal range of motion.         General: Tenderness present.      Comments: Some right low back tenderness.  Straight leg raising was negative on the left and just a slight pulling sensation on the right behind the knee   Skin:     General: Skin is warm and dry.   Neurological:      General: No focal deficit present.      Mental Status: He is alert and oriented to person, place, and time.   Psychiatric:         Mood and Affect: Mood normal.         Behavior: Behavior normal.         ASSESSMENT urinalysis is completely clear.  #1-low back pain with radiation around the flank toward the groin  #2-lower  abdominal discomfort, not localized     Problems Addressed this Visit        Gastrointestinal Abdominal     Lower abdominal pain    Relevant Orders    POC Urinalysis Dipstick, Automated    CBC & Differential    Comprehensive Metabolic Panel       Musculoskeletal and Injuries    Acute bilateral low back pain without sciatica - Primary    Relevant Orders    POC Urinalysis Dipstick, Automated    CBC & Differential    Comprehensive Metabolic Panel      Diagnoses       Codes Comments    Acute bilateral low back pain without sciatica    -  Primary ICD-10-CM: M54.5  ICD-9-CM: 724.2, 338.19     Lower abdominal pain     ICD-10-CM: R10.30  ICD-9-CM: 789.09           PLAN I have ordered a CBC and CMP to be drawn and have ordered a CT scan of the abdomen and would like to see the patient back in follow-up following those tests.  He was advised that if his symptoms worsen to go to the emergency room and he can use over-the-counter analgesics as needed    There are no Patient Instructions on file for this visit.  No follow-ups on file.

## 2021-01-21 LAB
ALBUMIN SERPL-MCNC: 4.4 G/DL (ref 3.5–5.2)
ALBUMIN/GLOB SERPL: 2 G/DL
ALP SERPL-CCNC: 57 U/L (ref 39–117)
ALT SERPL-CCNC: 30 U/L (ref 1–41)
AST SERPL-CCNC: 25 U/L (ref 1–40)
BASOPHILS # BLD AUTO: 0.06 10*3/MM3 (ref 0–0.2)
BASOPHILS NFR BLD AUTO: 0.8 % (ref 0–1.5)
BILIRUB SERPL-MCNC: 0.5 MG/DL (ref 0–1.2)
BUN SERPL-MCNC: 12 MG/DL (ref 6–20)
BUN/CREAT SERPL: 14.8 (ref 7–25)
CALCIUM SERPL-MCNC: 9.6 MG/DL (ref 8.6–10.5)
CHLORIDE SERPL-SCNC: 102 MMOL/L (ref 98–107)
CO2 SERPL-SCNC: 28.8 MMOL/L (ref 22–29)
CREAT SERPL-MCNC: 0.81 MG/DL (ref 0.76–1.27)
EOSINOPHIL # BLD AUTO: 0.27 10*3/MM3 (ref 0–0.4)
EOSINOPHIL NFR BLD AUTO: 3.5 % (ref 0.3–6.2)
ERYTHROCYTE [DISTWIDTH] IN BLOOD BY AUTOMATED COUNT: 14 % (ref 12.3–15.4)
GLOBULIN SER CALC-MCNC: 2.2 GM/DL
GLUCOSE SERPL-MCNC: 94 MG/DL (ref 65–99)
HCT VFR BLD AUTO: 56.9 % (ref 37.5–51)
HGB BLD-MCNC: 19.2 G/DL (ref 13–17.7)
IMM GRANULOCYTES # BLD AUTO: 0.03 10*3/MM3 (ref 0–0.05)
IMM GRANULOCYTES NFR BLD AUTO: 0.4 % (ref 0–0.5)
LYMPHOCYTES # BLD AUTO: 1.94 10*3/MM3 (ref 0.7–3.1)
LYMPHOCYTES NFR BLD AUTO: 25.5 % (ref 19.6–45.3)
MCH RBC QN AUTO: 30.8 PG (ref 26.6–33)
MCHC RBC AUTO-ENTMCNC: 33.7 G/DL (ref 31.5–35.7)
MCV RBC AUTO: 91.3 FL (ref 79–97)
MONOCYTES # BLD AUTO: 0.78 10*3/MM3 (ref 0.1–0.9)
MONOCYTES NFR BLD AUTO: 10.2 % (ref 5–12)
NEUTROPHILS # BLD AUTO: 4.53 10*3/MM3 (ref 1.7–7)
NEUTROPHILS NFR BLD AUTO: 59.6 % (ref 42.7–76)
NRBC BLD AUTO-RTO: 0 /100 WBC (ref 0–0.2)
PLATELET # BLD AUTO: 239 10*3/MM3 (ref 140–450)
POTASSIUM SERPL-SCNC: 4.8 MMOL/L (ref 3.5–5.2)
PROT SERPL-MCNC: 6.6 G/DL (ref 6–8.5)
RBC # BLD AUTO: 6.23 10*6/MM3 (ref 4.14–5.8)
SODIUM SERPL-SCNC: 138 MMOL/L (ref 136–145)
WBC # BLD AUTO: 7.61 10*3/MM3 (ref 3.4–10.8)

## 2021-01-22 DIAGNOSIS — R10.30 LOWER ABDOMINAL PAIN: ICD-10-CM

## 2021-01-22 DIAGNOSIS — M54.50 ACUTE BILATERAL LOW BACK PAIN WITHOUT SCIATICA: ICD-10-CM

## 2021-01-27 ENCOUNTER — OFFICE VISIT (OUTPATIENT)
Dept: FAMILY MEDICINE CLINIC | Facility: CLINIC | Age: 36
End: 2021-01-27

## 2021-01-27 VITALS
RESPIRATION RATE: 16 BRPM | SYSTOLIC BLOOD PRESSURE: 110 MMHG | HEART RATE: 63 BPM | DIASTOLIC BLOOD PRESSURE: 80 MMHG | TEMPERATURE: 97.5 F | OXYGEN SATURATION: 99 % | BODY MASS INDEX: 29.81 KG/M2 | HEIGHT: 67 IN | WEIGHT: 189.9 LBS

## 2021-01-27 DIAGNOSIS — R10.30 LOWER ABDOMINAL PAIN: Primary | ICD-10-CM

## 2021-01-27 DIAGNOSIS — E78.5 HYPERLIPIDEMIA, UNSPECIFIED HYPERLIPIDEMIA TYPE: Primary | ICD-10-CM

## 2021-01-27 DIAGNOSIS — M54.50 ACUTE BILATERAL LOW BACK PAIN WITHOUT SCIATICA: ICD-10-CM

## 2021-01-27 DIAGNOSIS — D75.1 ERYTHROCYTOSIS: ICD-10-CM

## 2021-01-27 PROCEDURE — 99214 OFFICE O/P EST MOD 30 MIN: CPT | Performed by: INTERNAL MEDICINE

## 2021-01-27 NOTE — PROGRESS NOTES
Subjective   Garrick Guo is a 35 y.o. male. Patient is here today for follow-up on his lower abdominal right inguinal pain and intermittent numbness down his right leg.  He is a bit better.  He does have some discomfort with movements.  He has had no digestive or abdominal problems aside from the discomfort.  He has been doing a lot of weight lifting and does tell me that he has been doing some testosterone injections.  Chief Complaint   Patient presents with   • Seizures     PT HERE FOR FOLLOW UP CT SCAN           Vitals:    01/27/21 1037   BP: 110/80   Pulse: 63   Resp: 16   Temp: 97.5 °F (36.4 °C)   SpO2: 99%     Body mass index is 29.74 kg/m².  The following portions of the patient's history were reviewed and updated as appropriate: allergies, current medications, past family history, past medical history, past social history, past surgical history and problem list.    Past Medical History:   Diagnosis Date   • Acute sinusitis    • ADD (attention deficit disorder)    • Carpal tunnel syndrome    • Chronic bronchitis (CMS/HCC)    • Viral URI       Allergies   Allergen Reactions   • Amoxicillin    • Doxycycline Other (See Comments)     Seizure/memory loss      Social History     Socioeconomic History   • Marital status:      Spouse name: Not on file   • Number of children: Not on file   • Years of education: Not on file   • Highest education level: Not on file   Tobacco Use   • Smoking status: Former Smoker   • Smokeless tobacco: Former User   Substance and Sexual Activity   • Alcohol use: No   • Drug use: No   • Sexual activity: Not Currently        Current Outpatient Medications:   •  levETIRAcetam (KEPPRA) 1000 MG tablet, Take 1 tablet by mouth Every 12 (Twelve) Hours., Disp: 180 tablet, Rfl: 2     Objective     History of Present Illness     Review of Systems   Constitutional: Negative.    HENT: Negative.    Respiratory: Negative.    Cardiovascular: Negative.    Gastrointestinal: Positive for  abdominal pain.        Right lower abdominal discomfort   Genitourinary: Negative.    Musculoskeletal: Positive for back pain.   Skin: Negative.    Neurological: Negative.    Hematological: Negative.    Psychiatric/Behavioral: Negative.        Physical Exam  Vitals signs and nursing note reviewed.   Constitutional:       General: He is not in acute distress.     Appearance: Normal appearance. He is not ill-appearing.   HENT:      Head: Normocephalic and atraumatic.   Eyes:      General: No scleral icterus.     Conjunctiva/sclera: Conjunctivae normal.   Neck:      Musculoskeletal: Normal range of motion and neck supple.   Cardiovascular:      Rate and Rhythm: Normal rate and regular rhythm.      Heart sounds: Normal heart sounds.   Pulmonary:      Effort: Pulmonary effort is normal. No respiratory distress.      Breath sounds: Normal breath sounds. No wheezing or rales.   Musculoskeletal: Normal range of motion.      Right lower leg: No edema.      Left lower leg: No edema.   Skin:     General: Skin is warm and dry.   Neurological:      General: No focal deficit present.      Mental Status: He is alert and oriented to person, place, and time.   Psychiatric:         Mood and Affect: Mood normal.         Behavior: Behavior normal.         ASSESSMENT CBC had a normal white cell count of 7600 with an elevated RBC count of 6.23, hemoglobin 19.2 and hematocrit 56.9, probably related to his testosterone use.  CMP was completely normal and his urinalysis was clear with no blood.  CT scan of the abdomen and pelvis showed bilateral nonobstructive stones in the kidneys but no hydronephrosis and no other abnormalities at all and no hernias.  #1-bilateral nonobstructive renal stones  #2-lower abdominal inguinal discomfort, probably back related  #3-erythrocytosis, probably secondary to testosterone use     Problems Addressed this Visit        Gastrointestinal Abdominal     Lower abdominal pain - Primary       Hematology and  Neoplasia    Erythrocytosis       Musculoskeletal and Injuries    Acute bilateral low back pain without sciatica      Diagnoses       Codes Comments    Lower abdominal pain    -  Primary ICD-10-CM: R10.30  ICD-9-CM: 789.09     Acute bilateral low back pain without sciatica     ICD-10-CM: M54.5  ICD-9-CM: 724.2, 338.19     Erythrocytosis     ICD-10-CM: D75.1  ICD-9-CM: 289.0           PLAN I advised the patient to discontinue the testosterone.  He has a chiropractor that he sees and will see him as I suspect a lot of this is back related.  I encouraged him to drink a lot of fluids for the renal stones but nothing further needs to be done currently.    There are no Patient Instructions on file for this visit.  No follow-ups on file.

## 2021-02-10 ENCOUNTER — OFFICE VISIT (OUTPATIENT)
Dept: FAMILY MEDICINE CLINIC | Facility: CLINIC | Age: 36
End: 2021-02-10

## 2021-02-10 VITALS
WEIGHT: 193 LBS | HEIGHT: 67 IN | OXYGEN SATURATION: 100 % | TEMPERATURE: 97.8 F | HEART RATE: 90 BPM | RESPIRATION RATE: 16 BRPM | SYSTOLIC BLOOD PRESSURE: 120 MMHG | DIASTOLIC BLOOD PRESSURE: 74 MMHG | BODY MASS INDEX: 30.29 KG/M2

## 2021-02-10 DIAGNOSIS — D75.1 ERYTHROCYTOSIS: ICD-10-CM

## 2021-02-10 DIAGNOSIS — G40.509 NONINTRACTABLE EPILEPSY DUE TO EXTERNAL CAUSES, WITHOUT STATUS EPILEPTICUS (HCC): ICD-10-CM

## 2021-02-10 DIAGNOSIS — E78.5 HYPERLIPIDEMIA, UNSPECIFIED HYPERLIPIDEMIA TYPE: Primary | ICD-10-CM

## 2021-02-10 DIAGNOSIS — R10.30 LOWER ABDOMINAL PAIN: ICD-10-CM

## 2021-02-10 PROBLEM — F41.8 MIXED ANXIETY AND DEPRESSIVE DISORDER: Status: RESOLVED | Noted: 2018-11-06 | Resolved: 2021-02-10

## 2021-02-10 PROBLEM — Z48.02 VISIT FOR SUTURE REMOVAL: Status: RESOLVED | Noted: 2017-12-27 | Resolved: 2021-02-10

## 2021-02-10 PROCEDURE — 99213 OFFICE O/P EST LOW 20 MIN: CPT | Performed by: INTERNAL MEDICINE

## 2021-02-10 NOTE — PROGRESS NOTES
Subjective   Garrick Guo is a 35 y.o. male. Patient is here today for follow-up on his hyperlipidemia, lower abdominal pain, erythrocytosis and history of seizures.  Has had no recent seizures.  He is tolerating medicines.  His abdominal pain is resolved and his back pain is improved seeing a chiropractor and using an inversion table.  Chief Complaint   Patient presents with   • Seizures     HYPERLIPIDEMIA- FOLLOW UP LABS           Vitals:    02/10/21 0854   BP: 120/74   Pulse: 90   Resp: 16   Temp: 97.8 °F (36.6 °C)   SpO2: 100%     Body mass index is 30.22 kg/m².  The following portions of the patient's history were reviewed and updated as appropriate: allergies, current medications, past family history, past medical history, past social history, past surgical history and problem list.    Past Medical History:   Diagnosis Date   • Acute sinusitis    • ADD (attention deficit disorder)    • Carpal tunnel syndrome    • Chronic bronchitis (CMS/HCC)    • Viral URI       Allergies   Allergen Reactions   • Amoxicillin    • Doxycycline Other (See Comments)     Seizure/memory loss      Social History     Socioeconomic History   • Marital status:      Spouse name: Not on file   • Number of children: Not on file   • Years of education: Not on file   • Highest education level: Not on file   Tobacco Use   • Smoking status: Former Smoker   • Smokeless tobacco: Former User   Substance and Sexual Activity   • Alcohol use: No   • Drug use: No   • Sexual activity: Not Currently        Current Outpatient Medications:   •  levETIRAcetam (KEPPRA) 1000 MG tablet, Take 1 tablet by mouth Every 12 (Twelve) Hours., Disp: 180 tablet, Rfl: 2     Objective     History of Present Illness     Review of Systems   Constitutional: Negative.    HENT: Negative.    Respiratory: Negative.    Cardiovascular: Negative.    Gastrointestinal: Negative.    Genitourinary: Negative.    Musculoskeletal: Negative.    Skin: Negative.     Neurological: Negative.    Hematological: Negative.    Psychiatric/Behavioral: Negative.        Physical Exam  Vitals signs (110/70) and nursing note reviewed.   Constitutional:       General: He is not in acute distress.     Appearance: Normal appearance. He is not ill-appearing.   HENT:      Head: Normocephalic and atraumatic.   Eyes:      General: No scleral icterus.     Conjunctiva/sclera: Conjunctivae normal.   Neck:      Musculoskeletal: Normal range of motion and neck supple.   Cardiovascular:      Rate and Rhythm: Normal rate and regular rhythm.      Heart sounds: Normal heart sounds.   Pulmonary:      Effort: Pulmonary effort is normal. No respiratory distress.      Breath sounds: Normal breath sounds. No wheezing or rales.   Musculoskeletal: Normal range of motion.      Right lower leg: No edema.      Left lower leg: No edema.   Skin:     General: Skin is warm and dry.   Neurological:      General: No focal deficit present.      Mental Status: He is alert and oriented to person, place, and time.   Psychiatric:         Mood and Affect: Mood normal.         Behavior: Behavior normal.         ASSESSMENT CMP was essentially normal with a potassium of 5.3 and AST of 42.  Lipid panel had a total cholesterol 170, HDL 49 and , improved from prior reading.  TSH is quite normal.  #1-hyperlipidemia, mild and asymptomatic, will continue with diet control  #2-seizure disorder, no recent seizures  #3-erythrocytosis, asymptomatic  #4-abdominal pain, resolved     Problems Addressed this Visit        Cardiac and Vasculature    Hyperlipidemia - Primary       Hematology and Neoplasia    Erythrocytosis       Neuro    Epilepsy (CMS/[facility])      Diagnoses       Codes Comments    Hyperlipidemia, unspecified hyperlipidemia type    -  Primary ICD-10-CM: E78.5  ICD-9-CM: 272.4     Erythrocytosis     ICD-10-CM: D75.1  ICD-9-CM: 289.0     Nonintractable epilepsy due to external causes, without status epilepticus (CMS/[facility])      ICD-10-CM: G40.509  ICD-9-CM: 345.90           PLAN the patient will continue current medicines as now and I will recheck him in 6 months with a CBC, ferritin level, CMP, lipid panel    There are no Patient Instructions on file for this visit.  Return in about 6 months (around 8/10/2021) for with labs.

## 2021-05-21 RX ORDER — LEVETIRACETAM 1000 MG/1
TABLET ORAL
Qty: 180 TABLET | Refills: 1 | Status: SHIPPED | OUTPATIENT
Start: 2021-05-21 | End: 2021-10-04 | Stop reason: SDUPTHER

## 2021-08-24 DIAGNOSIS — E78.5 HYPERLIPIDEMIA, UNSPECIFIED HYPERLIPIDEMIA TYPE: ICD-10-CM

## 2021-08-24 DIAGNOSIS — D75.1 ERYTHROCYTOSIS: ICD-10-CM

## 2021-08-26 LAB
ALBUMIN SERPL-MCNC: 4.4 G/DL (ref 3.5–5.2)
ALBUMIN/GLOB SERPL: 1.9 G/DL
ALP SERPL-CCNC: 51 U/L (ref 39–117)
ALT SERPL-CCNC: 44 U/L (ref 1–41)
AST SERPL-CCNC: 39 U/L (ref 1–40)
BASOPHILS # BLD AUTO: 0.07 10*3/MM3 (ref 0–0.2)
BASOPHILS NFR BLD AUTO: 0.8 % (ref 0–1.5)
BILIRUB SERPL-MCNC: 0.3 MG/DL (ref 0–1.2)
BUN SERPL-MCNC: 21 MG/DL (ref 6–20)
BUN/CREAT SERPL: 23.3 (ref 7–25)
CALCIUM SERPL-MCNC: 10 MG/DL (ref 8.6–10.5)
CHLORIDE SERPL-SCNC: 105 MMOL/L (ref 98–107)
CHOLEST SERPL-MCNC: 193 MG/DL (ref 0–200)
CO2 SERPL-SCNC: 21.3 MMOL/L (ref 22–29)
CREAT SERPL-MCNC: 0.9 MG/DL (ref 0.76–1.27)
EOSINOPHIL # BLD AUTO: 0.58 10*3/MM3 (ref 0–0.4)
EOSINOPHIL NFR BLD AUTO: 6.4 % (ref 0.3–6.2)
ERYTHROCYTE [DISTWIDTH] IN BLOOD BY AUTOMATED COUNT: 15.7 % (ref 12.3–15.4)
FERRITIN SERPL-MCNC: 101 NG/ML (ref 30–400)
GLOBULIN SER CALC-MCNC: 2.3 GM/DL
GLUCOSE SERPL-MCNC: 83 MG/DL (ref 65–99)
HCT VFR BLD AUTO: 60.5 % (ref 37.5–51)
HDLC SERPL-MCNC: 29 MG/DL (ref 40–60)
HGB BLD-MCNC: 20.1 G/DL (ref 13–17.7)
IMM GRANULOCYTES # BLD AUTO: 0.05 10*3/MM3 (ref 0–0.05)
IMM GRANULOCYTES NFR BLD AUTO: 0.6 % (ref 0–0.5)
LDLC SERPL CALC-MCNC: 135 MG/DL (ref 0–100)
LDLC/HDLC SERPL: 4.54 {RATIO}
LYMPHOCYTES # BLD AUTO: 1.88 10*3/MM3 (ref 0.7–3.1)
LYMPHOCYTES NFR BLD AUTO: 20.7 % (ref 19.6–45.3)
MCH RBC QN AUTO: 32.5 PG (ref 26.6–33)
MCHC RBC AUTO-ENTMCNC: 33.2 G/DL (ref 31.5–35.7)
MCV RBC AUTO: 97.9 FL (ref 79–97)
MONOCYTES # BLD AUTO: 1.08 10*3/MM3 (ref 0.1–0.9)
MONOCYTES NFR BLD AUTO: 11.9 % (ref 5–12)
NEUTROPHILS # BLD AUTO: 5.43 10*3/MM3 (ref 1.7–7)
NEUTROPHILS NFR BLD AUTO: 59.6 % (ref 42.7–76)
NRBC BLD AUTO-RTO: 0 /100 WBC (ref 0–0.2)
PLATELET # BLD AUTO: 226 10*3/MM3 (ref 140–450)
POTASSIUM SERPL-SCNC: 5 MMOL/L (ref 3.5–5.2)
PROT SERPL-MCNC: 6.7 G/DL (ref 6–8.5)
RBC # BLD AUTO: 6.18 10*6/MM3 (ref 4.14–5.8)
SODIUM SERPL-SCNC: 140 MMOL/L (ref 136–145)
TRIGL SERPL-MCNC: 162 MG/DL (ref 0–150)
VLDLC SERPL CALC-MCNC: 29 MG/DL (ref 5–40)
WBC # BLD AUTO: 9.09 10*3/MM3 (ref 3.4–10.8)
WRITTEN AUTHORIZATION: NORMAL

## 2021-09-01 ENCOUNTER — OFFICE VISIT (OUTPATIENT)
Dept: FAMILY MEDICINE CLINIC | Facility: CLINIC | Age: 36
End: 2021-09-01

## 2021-09-01 VITALS
DIASTOLIC BLOOD PRESSURE: 80 MMHG | WEIGHT: 185.8 LBS | BODY MASS INDEX: 29.16 KG/M2 | HEART RATE: 85 BPM | OXYGEN SATURATION: 98 % | SYSTOLIC BLOOD PRESSURE: 132 MMHG | HEIGHT: 67 IN | TEMPERATURE: 98 F | RESPIRATION RATE: 18 BRPM

## 2021-09-01 DIAGNOSIS — E78.5 HYPERLIPIDEMIA, UNSPECIFIED HYPERLIPIDEMIA TYPE: Primary | ICD-10-CM

## 2021-09-01 DIAGNOSIS — G40.509 NONINTRACTABLE EPILEPSY DUE TO EXTERNAL CAUSES, WITHOUT STATUS EPILEPTICUS (HCC): ICD-10-CM

## 2021-09-01 DIAGNOSIS — D75.1 ERYTHROCYTOSIS: ICD-10-CM

## 2021-09-01 PROCEDURE — 99214 OFFICE O/P EST MOD 30 MIN: CPT | Performed by: INTERNAL MEDICINE

## 2021-09-01 RX ORDER — TESTOSTERONE CYPIONATE 100 MG/ML
250 VIAL (ML) INTRAMUSCULAR
COMMUNITY
End: 2022-01-18

## 2021-09-01 NOTE — PROGRESS NOTES
Subjective   Garrick Guo is a 35 y.o. male. Patient is here today for follow-up on his hyperlipidemia and history of epilepsy. Patient's generally stable. He has had no further seizures. He is injecting testosterone twice a week, 250 mg according to the patient. He generally feels well but adamantly says he will not get a Covid vaccination  Chief Complaint   Patient presents with   • Hyperlipidemia     6mo fu lab          Vitals:    09/01/21 0826   BP: 132/80   Pulse: 85   Resp: 18   Temp: 98 °F (36.7 °C)   SpO2: 98%     Body mass index is 29.09 kg/m².  The following portions of the patient's history were reviewed and updated as appropriate: allergies, current medications, past family history, past medical history, past social history, past surgical history and problem list.    Past Medical History:   Diagnosis Date   • Acute sinusitis    • ADD (attention deficit disorder)    • Carpal tunnel syndrome    • Chronic bronchitis (CMS/HCC)    • Viral URI       Allergies   Allergen Reactions   • Amoxicillin    • Doxycycline Other (See Comments)     Seizure/memory loss      Social History     Socioeconomic History   • Marital status:      Spouse name: Not on file   • Number of children: Not on file   • Years of education: Not on file   • Highest education level: Not on file   Tobacco Use   • Smoking status: Former Smoker   • Smokeless tobacco: Former User   Substance and Sexual Activity   • Alcohol use: No   • Drug use: No   • Sexual activity: Not Currently        Current Outpatient Medications:   •  levETIRAcetam (KEPPRA) 1000 MG tablet, TAKE ONE TABLET BY MOUTH EVERY 12 HOURS, Disp: 180 tablet, Rfl: 1  •  Testosterone Cypionate 100 MG/ML solution, Inject 250 mg as directed., Disp: , Rfl:      Objective     History of Present Illness     Review of Systems   Constitutional: Negative.    HENT: Negative.    Respiratory: Negative.    Cardiovascular: Negative.    Gastrointestinal: Negative.    Neurological:  Negative.  Negative for seizures.   Hematological: Negative.    Psychiatric/Behavioral: Negative.        Physical Exam  Vitals and nursing note reviewed.   Constitutional:       General: He is not in acute distress.     Appearance: Normal appearance. He is not ill-appearing.   HENT:      Head: Normocephalic and atraumatic.   Eyes:      General: No scleral icterus.     Conjunctiva/sclera: Conjunctivae normal.   Cardiovascular:      Rate and Rhythm: Normal rate and regular rhythm.      Heart sounds: Normal heart sounds.   Pulmonary:      Effort: Pulmonary effort is normal. No respiratory distress.      Breath sounds: Normal breath sounds. No wheezing or rales.   Musculoskeletal:         General: Normal range of motion.      Cervical back: Normal range of motion and neck supple.   Skin:     General: Skin is warm and dry.   Neurological:      General: No focal deficit present.      Mental Status: He is alert and oriented to person, place, and time.   Psychiatric:         Mood and Affect: Mood normal.         Behavior: Behavior normal.         ASSESSMENT CBC continues to show an increasing red cell count with the RBC count 6.18, hemoglobin 20.1 and hematocrit 60.5. I believe this is related to his testosterone use. Ferritin level was normal at 101. Lipid panel is stable with total cholesterol 193, HDL 29 and . CMP was essentially normal.  #1-testosterone use, leading to erythrocytosis  #2-worsening erythrocytosis, recommended discontinuing testosterone and follow-up.  #3-hyperlipidemia, not interested in statin medication  #4-seizure disorder controlled     Problems Addressed this Visit        Cardiac and Vasculature    Hyperlipidemia - Primary       Hematology and Neoplasia    Erythrocytosis       Neuro    Epilepsy (CMS/[facility])      Diagnoses       Codes Comments    Hyperlipidemia, unspecified hyperlipidemia type    -  Primary ICD-10-CM: E78.5  ICD-9-CM: 272.4     Erythrocytosis     ICD-10-CM: D75.1  ICD-9-CM: 289.0      Nonintractable epilepsy due to external causes, without status epilepticus (CMS/[facility])     ICD-10-CM: G40.509  ICD-9-CM: 345.90           PLAN I advised the patient to discontinue the testosterone he needs follow-up on his blood count. I had a long discussion with the patient about the COVID-19 vaccinations, their safety, effectiveness and the recommendations that he get it. I have also advised him that I am not going to continue seeing people who do not get vaccinated. Patient is aware of this, continues to insist that he will get vaccinated and agrees to find another physician.    There are no Patient Instructions on file for this visit.  Return in about 6 months (around 3/1/2022) for with labs.

## 2021-10-04 ENCOUNTER — OFFICE VISIT (OUTPATIENT)
Dept: FAMILY MEDICINE CLINIC | Facility: CLINIC | Age: 36
End: 2021-10-04

## 2021-10-04 VITALS
WEIGHT: 185.4 LBS | RESPIRATION RATE: 16 BRPM | DIASTOLIC BLOOD PRESSURE: 98 MMHG | HEIGHT: 67 IN | BODY MASS INDEX: 29.1 KG/M2 | SYSTOLIC BLOOD PRESSURE: 120 MMHG

## 2021-10-04 DIAGNOSIS — R10.9 FLANK PAIN: ICD-10-CM

## 2021-10-04 DIAGNOSIS — Z72.0 TOBACCO ABUSE: ICD-10-CM

## 2021-10-04 DIAGNOSIS — N20.0 KIDNEY STONES: ICD-10-CM

## 2021-10-04 DIAGNOSIS — G40.509 NONINTRACTABLE EPILEPSY DUE TO EXTERNAL CAUSES, WITHOUT STATUS EPILEPTICUS (HCC): Primary | ICD-10-CM

## 2021-10-04 PROCEDURE — 99214 OFFICE O/P EST MOD 30 MIN: CPT | Performed by: FAMILY MEDICINE

## 2021-10-04 RX ORDER — LEVETIRACETAM 1000 MG/1
1000 TABLET ORAL EVERY 12 HOURS
Qty: 180 TABLET | Refills: 3 | Status: SHIPPED | OUTPATIENT
Start: 2021-10-04 | End: 2022-05-10 | Stop reason: SDUPTHER

## 2021-10-04 NOTE — PROGRESS NOTES
HPI  Garrick Guo is a 35 y.o. male who is here establishing new care physician.  Apparently his previous physician refused to continue his care since he refused to have Covid vaccination.  Patient thinks he had previous Covid infection when he was sick for 2 weeks etc.  Reviewed old records as best possible.  Patient reports 2-week history of right flank pain that radiates into the testicle area.  Previous CT scan did show kidney stones in both kidneys and patient is concerned that this could be causing his pain.  Multiple other issues noted.  Hemoglobin increased on last lab work and patient admits to use of testosterone for bodybuilding etc.  Also requesting prescription for home oxygen to help with his bodybuilding and this was refused.  Patient does continue to smoke and plans on discontinuing which would also help with his blood count.  Apparently patient had a hypoxic episode and passed out many years ago sustaining a fracture and cerebral hemorrhage which led to seizure disorder.  Apparently seizures have been totally controlled with Keppra 1000 mg twice daily for the last several years.  Again old records reviewed.  Health history form completed and reviewed.  Reports some sinus issues as well as dizziness and headaches.  Reported some depression but this seems to be under good control without medication etc.  Patient does take a lot of vitamins and other supplements.  Specifically told patient absolutely does not need to be on iron supplement unless he is losing blood.      Review of Systems   HENT: Positive for congestion.    Genitourinary: Positive for flank pain. Negative for hematuria.   Musculoskeletal: Positive for arthralgias.   Allergic/Immunologic: Positive for environmental allergies.   Neurological: Positive for dizziness and headaches. Negative for seizures.   All other systems reviewed and are negative.        Past Medical History:   Diagnosis Date   • Acute sinusitis    • ADD  (attention deficit disorder)    • Carpal tunnel syndrome    • Chronic bronchitis (HCC)    • Viral URI        History reviewed. No pertinent surgical history.    Family History   Problem Relation Age of Onset   • Diabetes Father    • Diabetes Sister    • Diabetes Paternal Grandmother        Social History     Socioeconomic History   • Marital status:      Spouse name: Not on file   • Number of children: Not on file   • Years of education: Not on file   • Highest education level: Not on file   Tobacco Use   • Smoking status: Current Every Day Smoker     Packs/day: 2.00     Years: 18.00     Pack years: 36.00     Types: Cigarettes   • Smokeless tobacco: Former User   Substance and Sexual Activity   • Alcohol use: No   • Drug use: No   • Sexual activity: Not Currently       Vitals:    10/04/21 1004   BP: 120/98   Resp: 16        Body mass index is 29.04 kg/m².      Physical Exam  Vitals and nursing note reviewed.   Constitutional:       General: He is not in acute distress.     Appearance: He is well-developed.   HENT:      Head: Normocephalic and atraumatic.      Nose:      Comments: Patient with mask.  Provider with mask and shield  Eyes:      Extraocular Movements: Extraocular movements intact.      Conjunctiva/sclera: Conjunctivae normal.      Pupils: Pupils are equal, round, and reactive to light.   Neck:      Thyroid: No thyromegaly.   Cardiovascular:      Rate and Rhythm: Normal rate and regular rhythm.      Heart sounds: Normal heart sounds.   Pulmonary:      Effort: Pulmonary effort is normal. No respiratory distress.      Breath sounds: Normal breath sounds.   Abdominal:      General: There is no distension.      Palpations: Abdomen is soft. There is no mass.      Tenderness: There is no abdominal tenderness.      Hernia: No hernia is present.   Musculoskeletal:         General: No tenderness or deformity. Normal range of motion.      Cervical back: Normal range of motion.   Lymphadenopathy:       Cervical: No cervical adenopathy.   Skin:     General: Skin is warm and dry.      Coloration: Skin is not pale.      Findings: No rash.   Neurological:      General: No focal deficit present.      Mental Status: He is alert and oriented to person, place, and time.      Motor: No abnormal muscle tone.      Coordination: Coordination normal.   Psychiatric:         Mood and Affect: Mood normal.         Behavior: Behavior normal.         Thought Content: Thought content normal.         Judgment: Judgment normal.           Assessment/Plan    Diagnoses and all orders for this visit:    1. Kidney stones  -     Urinalysis With Microscopic If Indicated (No Culture) - Urine, Clean Catch  -     Cancel: CT Abdomen Pelvis Stone Protocol; Future  -     CT Abdomen Pelvis Stone Protocol; Future    2. Flank pain  -     Urinalysis With Microscopic If Indicated (No Culture) - Urine, Clean Catch  -     Cancel: CT Abdomen Pelvis Stone Protocol; Future  -     CT Abdomen Pelvis Stone Protocol; Future    3. Nonintractable epilepsy due to external causes, without status epilepticus (HCC)  -     levETIRAcetam (KEPPRA) 1000 MG tablet; Take 1 tablet by mouth Every 12 (Twelve) Hours.  Dispense: 180 tablet; Refill: 3    4. Tobacco abuse      Patient here mostly to establish a new primary care physician.  History of seizure disorder related to head injury many years ago.  Apparently has been seizure-free on current regimen for several years.  Previous CT scan did show bilateral kidney stones and patient reports some right flank pain that radiates into the right groin area.  Recent lab work did show elevated hemoglobin and patient admits to testosterone use and is trying to decrease.  Also planning to decrease cigarette use etc.  All of this was discussed.  Patient concerned about kidney stones and requesting repeat CT scan which is ordered at previous facility close to his home.  Otherwise will continue current therapy and told to return in  February for 6-month lab work etc.  Did recommend Covid vaccinations though patient is convinced he had previous infection?  All of this was also discussed.  Recommend physical therapy for weight risk of vaccination.    This note includes information entered using a voice recognition dictation system.

## 2021-10-05 ENCOUNTER — PATIENT ROUNDING (BHMG ONLY) (OUTPATIENT)
Dept: FAMILY MEDICINE CLINIC | Facility: CLINIC | Age: 36
End: 2021-10-05

## 2021-10-05 LAB
APPEARANCE UR: CLEAR
BILIRUB UR QL STRIP: NEGATIVE
COLOR UR: ABNORMAL
GLUCOSE UR QL: NEGATIVE
HGB UR QL STRIP: NEGATIVE
KETONES UR QL STRIP: NEGATIVE
LEUKOCYTE ESTERASE UR QL STRIP: NEGATIVE
NITRITE UR QL STRIP: NEGATIVE
PH UR STRIP: 6.5 [PH] (ref 5–8)
PROT UR QL STRIP: NEGATIVE
SP GR UR: 1.02 (ref 1–1.03)
UROBILINOGEN UR STRIP-MCNC: ABNORMAL MG/DL

## 2021-10-05 NOTE — PROGRESS NOTES
October 5, 2021    Hello, may I speak with Garrick Guo?    My name is Joelle Lawrence    I am  with [facility]  [facility] [facility] [address] [phone].    Before we get started may I verify your date of birth? 1985    I am calling to officially welcome you to our practice and ask about your recent visit. Is this a good time to talk? NO- voicemail left- patient called back    Tell me about your visit with us. What things went well? Dr. Sharma was great        We're always looking for ways to make our patients' experiences even better. Do you have recommendations on ways we may improve?  No    Overall were you satisfied with your first visit to our practice? Yes       I appreciate you taking the time to speak with me today. Is there anything else I can do for you? Checked on Keppra script, received by pharmacy, waiting on lab results.       Thank you, and have a great day.

## 2021-10-07 ENCOUNTER — TELEPHONE (OUTPATIENT)
Dept: FAMILY MEDICINE CLINIC | Facility: CLINIC | Age: 36
End: 2021-10-07

## 2021-10-07 NOTE — TELEPHONE ENCOUNTER
Caller: MARANDA     Relationship: [facility]     Best call back number: [phone]    What form or medical record are you requesting:  APPOINTMENT NOTES FOR CT WITH AND WITHOUT CONTRAST     Who is requesting this form or medical record from you: ALEXSANDRA     How would you like to receive the form or medical records (pick-up, mail, fax): FAX  If fax, what is the fax number: [phone]  HERB LOW       Timeframe paperwork needed: ASAP     Additional notes: N/A

## 2021-11-30 ENCOUNTER — OFFICE VISIT (OUTPATIENT)
Dept: FAMILY MEDICINE CLINIC | Facility: CLINIC | Age: 36
End: 2021-11-30

## 2021-11-30 VITALS
DIASTOLIC BLOOD PRESSURE: 78 MMHG | HEIGHT: 67 IN | OXYGEN SATURATION: 99 % | TEMPERATURE: 96.9 F | RESPIRATION RATE: 20 BRPM | HEART RATE: 79 BPM | BODY MASS INDEX: 29.03 KG/M2 | WEIGHT: 185 LBS | SYSTOLIC BLOOD PRESSURE: 122 MMHG

## 2021-11-30 DIAGNOSIS — M79.18 MUSCULOSKELETAL PAIN: ICD-10-CM

## 2021-11-30 DIAGNOSIS — Z20.2 STD EXPOSURE: Primary | ICD-10-CM

## 2021-11-30 PROBLEM — J93.9 PNEUMOTHORAX: Status: ACTIVE | Noted: 2021-11-30

## 2021-11-30 PROBLEM — R20.0 NUMBNESS OF UPPER EXTREMITY: Status: ACTIVE | Noted: 2021-11-30

## 2021-11-30 PROBLEM — I60.9 SUBARACHNOID HEMORRHAGE (HCC): Status: ACTIVE | Noted: 2021-11-30

## 2021-11-30 PROBLEM — J15.9 BACTERIAL PNEUMONIA: Status: ACTIVE | Noted: 2021-11-30

## 2021-11-30 PROCEDURE — 99213 OFFICE O/P EST LOW 20 MIN: CPT | Performed by: FAMILY MEDICINE

## 2021-11-30 NOTE — PROGRESS NOTES
"Chief Complaint  Abdominal Pain and Shoulder Pain (left )    Subjective        Garrick Guo presents to [facility]  History of Present Illness  Garrick Guo presents today for a follow-up visit.    The patient lifts weights, and 1 week ago while lifting heavy weights, he felt something \"pop,\" and then began to experience dull pain in his right testicle. The pain now has radiated to other areas in his groin area. He describes the pain as a dull ache.     The patient describes what he thinks is a pinched nerve or torn ligament in his shoulder that began 2 weeks ago. He states that the shoulder will \"tingle, and it feels like a baseball is about to explode out of my bicep.\" This pain lasts for several hours, but he is not experiencing pain in the shoulder today. The bicep pain intermittently radiates to his back.    Previously, he noted that he was feeling stomach pain 1 week ago.    He is requesting to be tested for sexually transmitted diseases.      Objective      Review of Systems:  A review of systems was performed, and positive findings are noted in the HPI.      Vital Signs:   /78 (BP Location: Right arm)   Pulse 79   Temp 96.9 °F (36.1 °C) (Temporal)   Resp 20   Ht 170.2 cm (67\")   Wt 83.9 kg (185 lb)   SpO2 99%   BMI 28.98 kg/m²     Physical Exam  Vitals and nursing note reviewed.   Constitutional:       General: He is not in acute distress.     Appearance: He is well-developed.   HENT:      Head: Normocephalic and atraumatic.      Nose:      Comments: Patient with mask.  Provider with mask and shield  Eyes:      Conjunctiva/sclera: Conjunctivae normal.      Pupils: Pupils are equal, round, and reactive to light.   Neck:      Thyroid: No thyromegaly.   Cardiovascular:      Rate and Rhythm: Normal rate and regular rhythm.      Heart sounds: Normal heart sounds.   Pulmonary:      Effort: Pulmonary effort is normal. No respiratory distress.      Breath " sounds: Normal breath sounds.   Abdominal:      General: There is no distension.      Palpations: Abdomen is soft. There is no mass.      Tenderness: There is no abdominal tenderness.      Hernia: No hernia is present. There is no hernia in the left inguinal area or right inguinal area.   Genitourinary:     Penis: Normal and circumcised.       Testes: Normal.   Musculoskeletal:         General: No tenderness or deformity. Normal range of motion.      Cervical back: Normal range of motion.   Lymphadenopathy:      Cervical: No cervical adenopathy.      Lower Body: No right inguinal adenopathy. No left inguinal adenopathy.   Skin:     General: Skin is warm and dry.      Coloration: Skin is not pale.      Findings: No rash.   Neurological:      General: No focal deficit present.      Mental Status: He is alert and oriented to person, place, and time.      Motor: No abnormal muscle tone.      Coordination: Coordination normal.   Psychiatric:         Mood and Affect: Mood normal.         Behavior: Behavior normal.         Thought Content: Thought content normal.         Judgment: Judgment normal.        Result Review :              Assessment and Plan   Diagnoses and all orders for this visit:    1. STD exposure (Primary)  -     Chlamydia trachomatis, Neisseria gonorrhoeae, Trichomonas vaginalis, PCR - Urine, Urine, Clean Catch  -     HIV-1 / O / 2 Ag / Antibody 4th Generation  -     RPR    The patient came in today with right testicle, and groin pain after lifting heavy weights. On examination, I did not find any evidence of inguinal hernias on either side, so he can continue his weight lifting routine as usual. He also has been experiencing shoulder pain, and tingling that intermittently radiates to his back. He has seen a chiropractor with good results, and can continue with the chiropractor for his musculoskeletal pain. If the problem continues or worsens, he will let me know, and I will send a referral to a sports  medicine specialist. He is requesting to be tested for sexually transmitted diseases, and we will check blood work, and a urine test today.  Patient reports multiple sexual contacts.      Follow Up   Return for Next scheduled follow up.  Patient was given instructions and counseling regarding his condition or for health maintenance advice. Please see specific information pulled into the AVS if appropriate.     Transcribed from ambient dictation for Lars Sharma MD by Dolores Perea.  11/30/21   14:58 EST    Patient verbalized consent to the visit recording.

## 2021-12-01 LAB
HIV 1+2 AB+HIV1 P24 AG SERPL QL IA: NON REACTIVE
RPR SER QL: NON REACTIVE

## 2021-12-02 LAB
C TRACH RRNA SPEC QL NAA+PROBE: NEGATIVE
N GONORRHOEA RRNA SPEC QL NAA+PROBE: NEGATIVE
T VAGINALIS DNA SPEC QL NAA+PROBE: NEGATIVE

## 2022-01-18 ENCOUNTER — OFFICE VISIT (OUTPATIENT)
Dept: SURGERY | Facility: CLINIC | Age: 37
End: 2022-01-18

## 2022-01-18 ENCOUNTER — OFFICE VISIT (OUTPATIENT)
Dept: FAMILY MEDICINE CLINIC | Facility: CLINIC | Age: 37
End: 2022-01-18

## 2022-01-18 VITALS — BODY MASS INDEX: 28.41 KG/M2 | WEIGHT: 181 LBS | HEIGHT: 67 IN

## 2022-01-18 VITALS
HEART RATE: 85 BPM | SYSTOLIC BLOOD PRESSURE: 120 MMHG | WEIGHT: 184 LBS | OXYGEN SATURATION: 96 % | HEIGHT: 67 IN | DIASTOLIC BLOOD PRESSURE: 88 MMHG | BODY MASS INDEX: 28.88 KG/M2

## 2022-01-18 DIAGNOSIS — L02.211 CUTANEOUS ABSCESS OF ABDOMINAL WALL: ICD-10-CM

## 2022-01-18 DIAGNOSIS — L08.9 INFECTED SEBACEOUS CYST: Primary | ICD-10-CM

## 2022-01-18 DIAGNOSIS — L02.219 CELLULITIS AND ABSCESS OF TRUNK: Primary | ICD-10-CM

## 2022-01-18 DIAGNOSIS — L03.319 CELLULITIS AND ABSCESS OF TRUNK: Primary | ICD-10-CM

## 2022-01-18 DIAGNOSIS — L72.3 INFECTED SEBACEOUS CYST: Primary | ICD-10-CM

## 2022-01-18 PROCEDURE — 87186 SC STD MICRODIL/AGAR DIL: CPT | Performed by: SURGERY

## 2022-01-18 PROCEDURE — 87205 SMEAR GRAM STAIN: CPT | Performed by: SURGERY

## 2022-01-18 PROCEDURE — 99202 OFFICE O/P NEW SF 15 MIN: CPT | Performed by: SURGERY

## 2022-01-18 PROCEDURE — 87147 CULTURE TYPE IMMUNOLOGIC: CPT | Performed by: SURGERY

## 2022-01-18 PROCEDURE — 87070 CULTURE OTHR SPECIMN AEROBIC: CPT | Performed by: SURGERY

## 2022-01-18 PROCEDURE — 99213 OFFICE O/P EST LOW 20 MIN: CPT | Performed by: FAMILY MEDICINE

## 2022-01-18 PROCEDURE — 10060 I&D ABSCESS SIMPLE/SINGLE: CPT | Performed by: SURGERY

## 2022-01-18 RX ORDER — DOXYCYCLINE 100 MG/1
100 CAPSULE ORAL 2 TIMES DAILY
Qty: 20 CAPSULE | Refills: 0 | Status: SHIPPED | OUTPATIENT
Start: 2022-01-18 | End: 2022-01-21 | Stop reason: ALTCHOICE

## 2022-01-18 RX ORDER — TESTOSTERONE ENANTHATE 200 MG/ML
INJECTION, SOLUTION INTRAMUSCULAR
COMMUNITY

## 2022-01-18 NOTE — PROGRESS NOTES
Subjective   Garrick Guo is a 36 y.o. male who presents to the office in surgical consultation from Lars Sharma MD for an abscess of the right abdominal wall and left posterior thigh.    History of Present Illness     The patient has several tattoos and was going to get a new tattoo on his back.  He routinely shaves his whole body in order to prepare for a tattoo.  He shaved his body and developed a painful lesion on his abdominal wall and left posterior thigh.  The abdominal wall lesion seem to be a pimple that he tried to squeeze but was not able to establish drainage.  The area has gotten much larger and more painful.  It is erythematous on its surface.  The left posterior thigh lesion has been a steady growth with erythema and pain but is much smaller than the abdominal wall lesion.  Neither 1 has been draining.    Review of Systems   Constitutional: Negative for fatigue and fever.   Respiratory: Negative for chest tightness and shortness of breath.    Cardiovascular: Negative for chest pain and palpitations.   Gastrointestinal: Negative for abdominal pain, blood in stool, constipation, diarrhea, nausea and vomiting.     Past Medical History:   Diagnosis Date   • Acute sinusitis    • ADD (attention deficit disorder)    • Carpal tunnel syndrome    • Chronic bronchitis (HCC)    • Epilepsy (HCC)    • Kidney stones    • Seizures (HCC)    • Subarachnoid hemorrhage (HCC)    • Viral URI      Past Surgical History:   Procedure Laterality Date   • ABSCESS DRAINAGE       Family History   Problem Relation Age of Onset   • Diabetes Father    • Heart disease Father    • Hypertension Father    • Diabetes Sister    • Diabetes Paternal Grandmother    • Diabetes Mother      Social History     Socioeconomic History   • Marital status:    Tobacco Use   • Smoking status: Current Every Day Smoker     Packs/day: 2.00     Years: 20.00     Pack years: 40.00     Types: Cigarettes   • Smokeless tobacco: Former User    Vaping Use   • Vaping Use: Never used   Substance and Sexual Activity   • Alcohol use: No   • Drug use: Yes     Types: Marijuana   • Sexual activity: Not Currently       Objective   Physical Exam  Constitutional:       Appearance: Normal appearance. He is well-developed. He is not toxic-appearing.   Eyes:      General: No scleral icterus.  Pulmonary:      Effort: Pulmonary effort is normal. No respiratory distress.   Skin:     General: Skin is warm and dry.      Comments: There is a 3 to 4 cm area of erythema and induration of the right abdominal wall.  There is palpable fluctuance present consistent with an abscess.  There is a 2 to 3 cm area of erythema on the left posterior thigh with palpable induration but no fluctuance.   Neurological:      Mental Status: He is alert and oriented to person, place, and time.   Psychiatric:         Behavior: Behavior normal.         Thought Content: Thought content normal.         Judgment: Judgment normal.     Procedure  The abscess of the right abdominal wall was prepped and draped in the standard surgical fashion.  It was infiltrated with 1% lidocaine without epinephrine.  An incision was made with a scalpel carried through the skin into the abscess cavity.  Purulent material was encountered.  Cultures were taken.  The abscess was completely evacuated and irrigated with local anesthetic.  It was then packed with iodoform gauze.  A dressing was applied.  The patient tolerated the procedure well.    Assessment/Plan       The primary encounter diagnosis was Infected sebaceous cyst. A diagnosis of Cutaneous abscess of abdominal wall was also pertinent to this visit.    The patient has a right abdominal wall and left posterior thigh abscess.  The right abdominal wall abscess is liquefied and an incision and drainage of the abscess was performed in the office.  Local wound care was discussed with him.    The posterior thigh abscess has no fluctuance and no liquefied material on  exam.  This will be treated with antibiotics, which has already been prescribed.    He will follow-up in 1 week.

## 2022-01-18 NOTE — PROGRESS NOTES
HPI  Garrick Guo is a 36 y.o. male who is here for abscess on abdomen and also new abscess forming on buttocks.  Requesting treatment.  Called online and was sent prescription for doxycycline 200 mg twice daily?  Apparently has had I&D's in the past and recommend referral to general surgeon for further intervention.      Review of Systems   Skin: Positive for wound.        Abscess on abdomen and new abscess forming on buttocks region, upper thigh   All other systems reviewed and are negative.        Past Medical History:   Diagnosis Date   • Acute sinusitis    • ADD (attention deficit disorder)    • Carpal tunnel syndrome    • Chronic bronchitis (HCC)    • Viral URI        No past surgical history on file.    Family History   Problem Relation Age of Onset   • Diabetes Father    • Diabetes Sister    • Diabetes Paternal Grandmother        Social History     Socioeconomic History   • Marital status:    Tobacco Use   • Smoking status: Current Every Day Smoker     Packs/day: 2.00     Years: 18.00     Pack years: 36.00     Types: Cigarettes   • Smokeless tobacco: Former User   Substance and Sexual Activity   • Alcohol use: No   • Drug use: No   • Sexual activity: Not Currently       Vitals:    01/18/22 1052   BP: 120/88   Pulse: 85   SpO2: 96%        Body mass index is 28.81 kg/m².      Physical Exam  Vitals and nursing note reviewed.   Constitutional:       General: He is not in acute distress.     Appearance: He is well-developed.   HENT:      Head: Normocephalic and atraumatic.      Nose:      Comments: Patient with mask.  Provider with mask and shield  Eyes:      Conjunctiva/sclera: Conjunctivae normal.      Pupils: Pupils are equal, round, and reactive to light.   Neck:      Thyroid: No thyromegaly.   Cardiovascular:      Rate and Rhythm: Normal rate and regular rhythm.      Heart sounds: Normal heart sounds.   Pulmonary:      Effort: Pulmonary effort is normal. No respiratory distress.      Breath  sounds: Normal breath sounds.   Abdominal:      General: There is no distension.      Palpations: Abdomen is soft. There is no mass.      Tenderness: There is no abdominal tenderness.      Hernia: No hernia is present.   Musculoskeletal:         General: No tenderness or deformity. Normal range of motion.      Cervical back: Normal range of motion.   Lymphadenopathy:      Cervical: No cervical adenopathy.   Skin:     General: Skin is warm and dry.      Coloration: Skin is not pale.      Findings: Erythema and lesion present. No rash.          Neurological:      Mental Status: He is alert and oriented to person, place, and time.      Motor: No abnormal muscle tone.      Coordination: Coordination normal.   Psychiatric:         Behavior: Behavior normal.         Thought Content: Thought content normal.         Judgment: Judgment normal.           Assessment/Plan    Diagnoses and all orders for this visit:    1. Cellulitis and abscess of trunk (Primary)  -     doxycycline (MONODOX) 100 MG capsule; Take 1 capsule by mouth 2 (Two) Times a Day.  Dispense: 20 capsule; Refill: 0  -     Ambulatory Referral to General Surgery        Patient presents with abscesses on abdomen and upper thigh region as discussed and noted.  Recommend continue doxycycline twice daily and warm soaks.  Recommend referral to general surgeon for eventual I&D.

## 2022-01-21 DIAGNOSIS — Z22.322 MRSA (METHICILLIN RESISTANT STAPH AUREUS) CULTURE POSITIVE: Primary | ICD-10-CM

## 2022-01-21 RX ORDER — CLINDAMYCIN HYDROCHLORIDE 300 MG/1
300 CAPSULE ORAL 3 TIMES DAILY
Qty: 30 CAPSULE | Refills: 0 | Status: SHIPPED | OUTPATIENT
Start: 2022-01-21 | End: 2022-01-31

## 2022-01-21 NOTE — PROGRESS NOTES
The lab called to inform Dr. Barriga that the patients prelimanary wound culture is MRSA. Dr. Barriga would like to change the patients antibiotic from doxycycline to Bactrim  mg 1 tab po BID x 10 days, #20, 0 refills but the patient confirmed that he can not take Bactrim due to causing seizures. Dr. Barriga changed the medication to Cleocin 300 mg 1 tab po TID x 10 days, #30, 0 refills.   The patient voiced understanding and will begin taking Cleocin and follow-up with Dr. Barriga on 1/25/22.

## 2022-01-22 LAB
BACTERIA SPEC AEROBE CULT: ABNORMAL
GRAM STN SPEC: ABNORMAL
GRAM STN SPEC: ABNORMAL

## 2022-01-25 ENCOUNTER — OFFICE VISIT (OUTPATIENT)
Dept: SURGERY | Facility: CLINIC | Age: 37
End: 2022-01-25

## 2022-01-25 VITALS — WEIGHT: 181 LBS | HEIGHT: 67 IN | BODY MASS INDEX: 28.41 KG/M2

## 2022-01-25 DIAGNOSIS — Z48.89 POSTOPERATIVE VISIT: Primary | ICD-10-CM

## 2022-01-25 PROCEDURE — 99024 POSTOP FOLLOW-UP VISIT: CPT | Performed by: SURGERY

## 2022-01-31 ENCOUNTER — TELEPHONE (OUTPATIENT)
Dept: FAMILY MEDICINE CLINIC | Facility: CLINIC | Age: 37
End: 2022-01-31

## 2022-01-31 DIAGNOSIS — Z79.899 HIGH RISK MEDICATION USE: ICD-10-CM

## 2022-01-31 DIAGNOSIS — E78.5 HYPERLIPIDEMIA, UNSPECIFIED HYPERLIPIDEMIA TYPE: ICD-10-CM

## 2022-01-31 DIAGNOSIS — D75.1 ERYTHROCYTOSIS: Primary | ICD-10-CM

## 2022-02-02 ENCOUNTER — TELEPHONE (OUTPATIENT)
Dept: FAMILY MEDICINE CLINIC | Facility: CLINIC | Age: 37
End: 2022-02-02

## 2022-02-02 NOTE — PROGRESS NOTES
Subjective   Garrick Guo is a 36 y.o. male who returns to the office after undergoing an incision and drainage of an abdominal wall abscess on 1/18/2022.     History of Present Illness     The patient is recovering well with no significant postop symptoms.  The abscess site is healing well.  The culture showed MRSA and he has been treated with clindamycin.    Review of Systems   Constitutional: Negative for activity change, appetite change, fatigue and fever.   Respiratory: Negative for chest tightness and shortness of breath.    Cardiovascular: Negative for chest pain and palpitations.   Gastrointestinal: Negative for abdominal pain, constipation, diarrhea and nausea.   Skin: Negative for rash and wound.   Psychiatric/Behavioral: Negative for agitation and confusion.       Objective   Physical Exam  Constitutional:       General: He is not in acute distress.     Appearance: Normal appearance. He is not ill-appearing or toxic-appearing.   Pulmonary:      Effort: Pulmonary effort is normal. No respiratory distress.   Skin:     Comments: Wound: Healing well with no evidence of infection.   Neurological:      Mental Status: He is alert.   Psychiatric:         Behavior: Behavior normal.         Assessment/Plan   The encounter diagnosis was Postoperative visit.    The patient had an incision and drainage of abdominal abscess that grew out MRSA.  He was treated with clindamycin and the area is healing well.  At this point no further treatment is necessary.  He will follow-up on an as-needed basis.

## 2022-02-08 ENCOUNTER — OFFICE VISIT (OUTPATIENT)
Dept: SURGERY | Facility: CLINIC | Age: 37
End: 2022-02-08

## 2022-02-08 VITALS — HEIGHT: 67 IN | BODY MASS INDEX: 28.41 KG/M2 | WEIGHT: 181 LBS

## 2022-02-08 DIAGNOSIS — Z48.89 POSTOPERATIVE VISIT: Primary | ICD-10-CM

## 2022-02-08 PROCEDURE — 99024 POSTOP FOLLOW-UP VISIT: CPT | Performed by: SURGERY

## 2022-02-08 NOTE — PROGRESS NOTES
Subjective   Garrick Guo is a 36 y.o. male who returns to the office after undergoing an incision and drainage of an abdominal wall abscess on 1/18/2022.     History of Present Illness     The patient is recovering well with no significant postop symptoms.  The incision has almost completely healed.    Review of Systems   Constitutional: Negative for activity change, appetite change, fatigue and fever.   Respiratory: Negative for chest tightness and shortness of breath.    Cardiovascular: Negative for chest pain and palpitations.   Gastrointestinal: Negative for abdominal pain, constipation, diarrhea and nausea.   Skin: Negative for rash and wound.   Psychiatric/Behavioral: Negative for agitation and confusion.       Objective   Physical Exam  Constitutional:       General: He is not in acute distress.     Appearance: Normal appearance. He is not ill-appearing or toxic-appearing.   Pulmonary:      Effort: Pulmonary effort is normal. No respiratory distress.   Abdominal:      Palpations: Abdomen is soft.      Tenderness: There is no abdominal tenderness.   Skin:     Comments: Abscess site has almost completely healed.   Neurological:      Mental Status: He is alert.   Psychiatric:         Behavior: Behavior normal.         Assessment/Plan   The encounter diagnosis was Postoperative visit.    The patient is recovering well from incision and drainage of an abdominal abscess.  At this point he has no limitations.  There is no further wound care is necessary.  He will follow up on as-needed basis.

## 2022-02-10 ENCOUNTER — OFFICE VISIT (OUTPATIENT)
Dept: FAMILY MEDICINE CLINIC | Facility: CLINIC | Age: 37
End: 2022-02-10

## 2022-02-10 VITALS
HEART RATE: 83 BPM | BODY MASS INDEX: 28.97 KG/M2 | TEMPERATURE: 96.4 F | DIASTOLIC BLOOD PRESSURE: 90 MMHG | OXYGEN SATURATION: 98 % | HEIGHT: 67 IN | RESPIRATION RATE: 20 BRPM | SYSTOLIC BLOOD PRESSURE: 122 MMHG | WEIGHT: 184.6 LBS

## 2022-02-10 DIAGNOSIS — F19.10 SUBSTANCE ABUSE: ICD-10-CM

## 2022-02-10 DIAGNOSIS — Z79.899 HIGH RISK MEDICATION USE: ICD-10-CM

## 2022-02-10 DIAGNOSIS — D75.1 ERYTHROCYTOSIS: Primary | ICD-10-CM

## 2022-02-10 DIAGNOSIS — E78.49 OTHER HYPERLIPIDEMIA: ICD-10-CM

## 2022-02-10 DIAGNOSIS — F17.210 CIGARETTE SMOKER: ICD-10-CM

## 2022-02-10 PROBLEM — Z72.0 TOBACCO ABUSE: Status: RESOLVED | Noted: 2018-01-16 | Resolved: 2022-02-10

## 2022-02-10 PROCEDURE — 99213 OFFICE O/P EST LOW 20 MIN: CPT | Performed by: FAMILY MEDICINE

## 2022-02-10 NOTE — PROGRESS NOTES
HPI  Garrick Guo is a 36 y.o. male who is here for follow up especially of recent lab showing elevated hemoglobin etc.  Patient apparently on some type of hormonal therapy and unfortunately continues to smoke especially since working at home.  All of this was again discussed.  Patient did give blood over the weekend which usually brings his hemoglobin down.  Again strongly recommended discontinuing or at least decreasing cigarette use.      Review of Systems   All other systems reviewed and are negative.        Past Medical History:   Diagnosis Date   • Acute sinusitis    • ADD (attention deficit disorder)    • Carpal tunnel syndrome    • Chronic bronchitis (HCC)    • Epilepsy (HCC)    • Kidney stones    • Seizures (HCC)    • Subarachnoid hemorrhage (HCC)    • Tobacco abuse 1/16/2018   • Viral URI        Past Surgical History:   Procedure Laterality Date   • ABSCESS DRAINAGE         Family History   Problem Relation Age of Onset   • Diabetes Father    • Heart disease Father    • Hypertension Father    • Diabetes Sister    • Diabetes Paternal Grandmother    • Diabetes Mother        Social History     Socioeconomic History   • Marital status:    Tobacco Use   • Smoking status: Current Every Day Smoker     Packs/day: 2.00     Years: 20.00     Pack years: 40.00     Types: Cigarettes   • Smokeless tobacco: Former User   Vaping Use   • Vaping Use: Never used   Substance and Sexual Activity   • Alcohol use: No   • Drug use: Yes     Types: Marijuana   • Sexual activity: Not Currently       Vitals:    02/10/22 0903   BP: 122/90   Pulse: 83   Resp: 20   Temp: 96.4 °F (35.8 °C)   SpO2: 98%        Body mass index is 28.9 kg/m².      Physical Exam  Vitals and nursing note reviewed.   Constitutional:       General: He is not in acute distress.     Appearance: He is well-developed.   HENT:      Head: Normocephalic and atraumatic.      Nose:      Comments: Patient with mask.  Provider with mask and shield  Eyes:       Extraocular Movements: Extraocular movements intact.      Conjunctiva/sclera: Conjunctivae normal.      Pupils: Pupils are equal, round, and reactive to light.   Neck:      Thyroid: No thyromegaly.   Cardiovascular:      Rate and Rhythm: Normal rate and regular rhythm.      Heart sounds: Normal heart sounds.   Pulmonary:      Effort: Pulmonary effort is normal. No respiratory distress.      Breath sounds: Normal breath sounds.   Abdominal:      General: There is no distension.      Palpations: Abdomen is soft. There is no mass.      Tenderness: There is no abdominal tenderness.      Hernia: No hernia is present.   Musculoskeletal:         General: No tenderness or deformity. Normal range of motion.      Cervical back: Normal range of motion.   Lymphadenopathy:      Cervical: No cervical adenopathy.   Skin:     General: Skin is warm and dry.      Coloration: Skin is not pale.      Findings: No rash.   Neurological:      General: No focal deficit present.      Mental Status: He is alert and oriented to person, place, and time.      Motor: No abnormal muscle tone.      Coordination: Coordination normal.   Psychiatric:         Mood and Affect: Mood normal.         Behavior: Behavior normal.         Thought Content: Thought content normal.         Judgment: Judgment normal.           Assessment/Plan    Diagnoses and all orders for this visit:    1. Erythrocytosis (Primary)    2. Substance abuse (HCC)    3. Cigarette smoker    4. Other hyperlipidemia    5. High risk medication use        Patient here for follow-up of multiple medical issues as discussed above.  Will be donating blood again in 2 months.  Again strongly recommend discontinuing cigarette use and discussed other issues affecting elevated blood count.  Also extremely low HDL level and again strongly recommend discontinuing cigarette use.  Also plans on adjusting diet.  Changes especially decreasing red meat.  Will recheck all lab work in 3 months.

## 2022-05-10 ENCOUNTER — OFFICE VISIT (OUTPATIENT)
Dept: FAMILY MEDICINE CLINIC | Facility: CLINIC | Age: 37
End: 2022-05-10

## 2022-05-10 VITALS
WEIGHT: 184 LBS | BODY MASS INDEX: 28.88 KG/M2 | OXYGEN SATURATION: 97 % | HEART RATE: 77 BPM | HEIGHT: 67 IN | SYSTOLIC BLOOD PRESSURE: 118 MMHG | DIASTOLIC BLOOD PRESSURE: 78 MMHG

## 2022-05-10 DIAGNOSIS — H61.23 EXCESSIVE CERUMEN IN EAR CANAL, BILATERAL: ICD-10-CM

## 2022-05-10 DIAGNOSIS — F17.210 CIGARETTE SMOKER: ICD-10-CM

## 2022-05-10 DIAGNOSIS — J06.9 UPPER RESPIRATORY TRACT INFECTION, UNSPECIFIED TYPE: Primary | ICD-10-CM

## 2022-05-10 DIAGNOSIS — G40.509 NONINTRACTABLE EPILEPSY DUE TO EXTERNAL CAUSES, WITHOUT STATUS EPILEPTICUS: ICD-10-CM

## 2022-05-10 DIAGNOSIS — J30.9 ALLERGIC RHINITIS, UNSPECIFIED SEASONALITY, UNSPECIFIED TRIGGER: ICD-10-CM

## 2022-05-10 PROCEDURE — 99213 OFFICE O/P EST LOW 20 MIN: CPT | Performed by: FAMILY MEDICINE

## 2022-05-10 RX ORDER — LEVETIRACETAM 1000 MG/1
1000 TABLET ORAL EVERY 12 HOURS
Qty: 180 TABLET | Refills: 1 | Status: SHIPPED | OUTPATIENT
Start: 2022-05-10 | End: 2022-11-17 | Stop reason: SDUPTHER

## 2022-05-10 RX ORDER — AZITHROMYCIN 250 MG/1
TABLET, FILM COATED ORAL
Qty: 6 TABLET | Refills: 0 | Status: SHIPPED | OUTPATIENT
Start: 2022-05-10 | End: 2022-06-13

## 2022-05-10 NOTE — PROGRESS NOTES
HPI  Garrick Guo is a 36 y.o. male who is here for follow up after recent lab work that did reveal significant decrease in cholesterol levels with diet changes only.  Also blood count has decreased especially after donating blood.  All of this was discussed.  Again strongly recommended discontinuing cigarette use.  Reports upper respiratory infection apparently he got from a child.  Has had cough congestion drainage for several weeks.  Is on Zyrtec and Flonase for known allergy symptoms.  Also reports vertigo symptoms and irritation in ear canals.  Discussed possible referral to ENT and allergy if symptoms do not improve.  Also needs refill on seizure medicines especially since will be needing new provider for next visit.      Review of Systems   Constitutional: Negative for chills, diaphoresis and fever.   HENT: Positive for congestion, ear pain and sore throat.    Respiratory: Positive for cough. Negative for shortness of breath.    Cardiovascular: Negative for chest pain.   Allergic/Immunologic: Positive for environmental allergies.   Neurological: Positive for dizziness. Negative for seizures.   All other systems reviewed and are negative.        Past Medical History:   Diagnosis Date   • Acute sinusitis    • ADD (attention deficit disorder)    • Carpal tunnel syndrome    • Chronic bronchitis (HCC)    • Epilepsy (HCC)    • Kidney stones    • Seizures (HCC)    • Subarachnoid hemorrhage (HCC)    • Tobacco abuse 1/16/2018   • Viral URI        Past Surgical History:   Procedure Laterality Date   • ABSCESS DRAINAGE         Family History   Problem Relation Age of Onset   • Diabetes Father    • Heart disease Father    • Hypertension Father    • Diabetes Sister    • Diabetes Paternal Grandmother    • Diabetes Mother        Social History     Socioeconomic History   • Marital status:    Tobacco Use   • Smoking status: Current Every Day Smoker     Packs/day: 2.00     Years: 20.00     Pack years: 40.00      Types: Cigarettes   • Smokeless tobacco: Former User   Vaping Use   • Vaping Use: Never used   Substance and Sexual Activity   • Alcohol use: No   • Drug use: Yes     Types: Marijuana   • Sexual activity: Not Currently       Vitals:    05/10/22 0830   BP: 118/78   Pulse: 77   SpO2: 97%        Body mass index is 28.81 kg/m².      Physical Exam      Assessment/Plan    Diagnoses and all orders for this visit:    1. Upper respiratory tract infection, unspecified type (Primary)  -     azithromycin (Zithromax) 250 MG tablet; Take 2 tablets the first day, then 1 tablet daily for 4 days.  Dispense: 6 tablet; Refill: 0    2. Nonintractable epilepsy due to external causes, without status epilepticus (HCC)  -     levETIRAcetam (KEPPRA) 1000 MG tablet; Take 1 tablet by mouth Every 12 (Twelve) Hours.  Dispense: 180 tablet; Refill: 1    3. Excessive cerumen in ear canal, bilateral  -     carbamide peroxide (Debrox) 6.5 % otic solution; Administer 5 drops into both ears 2 (Two) Times a Day.  Dispense: 22 mL; Refill: 2    4. Allergic rhinitis, unspecified seasonality, unspecified trigger    5. Cigarette smoker      Patient here for routine follow-up of recent lab work which showed significant improvement of cholesterol levels and also decreased blood count as discussed above.  Patient reports prolonged symptoms of upper respiratory symptoms.  Exam does show some inflammation especially of the right tonsillar area though no adenopathy.  Again strongly recommended discontinuing cigarette use.  Seizure-free and will continue Keppra pending follow-up with new primary care provider as discussed.  Ear canal due 0 dry wax in both ears and discussed Debrox and possible referral if symptoms persist or worsen to ENT and allergy specialist.

## 2022-06-13 ENCOUNTER — OFFICE VISIT (OUTPATIENT)
Dept: FAMILY MEDICINE CLINIC | Facility: CLINIC | Age: 37
End: 2022-06-13

## 2022-06-13 ENCOUNTER — TELEPHONE (OUTPATIENT)
Dept: FAMILY MEDICINE CLINIC | Facility: CLINIC | Age: 37
End: 2022-06-13

## 2022-06-13 VITALS
SYSTOLIC BLOOD PRESSURE: 112 MMHG | HEART RATE: 75 BPM | WEIGHT: 188 LBS | DIASTOLIC BLOOD PRESSURE: 68 MMHG | OXYGEN SATURATION: 99 % | HEIGHT: 67 IN | BODY MASS INDEX: 29.51 KG/M2

## 2022-06-13 DIAGNOSIS — Z79.890 LONG-TERM CURRENT USE OF TESTOSTERONE REPLACEMENT THERAPY: ICD-10-CM

## 2022-06-13 DIAGNOSIS — H61.23 BILATERAL IMPACTED CERUMEN: ICD-10-CM

## 2022-06-13 DIAGNOSIS — R42 VERTIGO: ICD-10-CM

## 2022-06-13 DIAGNOSIS — R09.81 CONGESTION OF PARANASAL SINUS: Primary | ICD-10-CM

## 2022-06-13 PROBLEM — M54.50 ACUTE BILATERAL LOW BACK PAIN WITHOUT SCIATICA: Status: RESOLVED | Noted: 2021-01-20 | Resolved: 2022-06-13

## 2022-06-13 PROBLEM — J15.9 BACTERIAL PNEUMONIA: Status: RESOLVED | Noted: 2021-11-30 | Resolved: 2022-06-13

## 2022-06-13 PROBLEM — M25.512 ACUTE PAIN OF LEFT SHOULDER: Status: RESOLVED | Noted: 2017-07-06 | Resolved: 2022-06-13

## 2022-06-13 PROBLEM — L98.9 SKIN LESION: Status: RESOLVED | Noted: 2017-08-30 | Resolved: 2022-06-13

## 2022-06-13 PROBLEM — M79.641 HAND PAIN, RIGHT: Status: RESOLVED | Noted: 2018-01-16 | Resolved: 2022-06-13

## 2022-06-13 PROCEDURE — 99214 OFFICE O/P EST MOD 30 MIN: CPT

## 2022-06-13 PROCEDURE — 69210 REMOVE IMPACTED EAR WAX UNI: CPT

## 2022-06-13 RX ORDER — ALBUTEROL SULFATE 90 UG/1
2 AEROSOL, METERED RESPIRATORY (INHALATION) EVERY 4 HOURS PRN
COMMUNITY
End: 2022-07-26 | Stop reason: SDUPTHER

## 2022-06-13 RX ORDER — MONTELUKAST SODIUM 10 MG/1
10 TABLET ORAL NIGHTLY
Qty: 90 TABLET | Refills: 0 | Status: SHIPPED | OUTPATIENT
Start: 2022-06-13

## 2022-06-13 NOTE — PROGRESS NOTES
Subjective   Garrick Guo is a 36 y.o. male. Presents today as a  New patient here to establish care  Chief Complaint   Patient presents with   • Annual Exam     Physical new patient   • Hyperlipidemia   • Pain   • substance abuse     history       History of Present Illness  Previous PCP: Dr Lars Sharma   Significant medical hx: HLD, Hx substance abuse, seizures, vertigo and headache  Significant family hx: heart disease, diabetes, cancer    Smoking 1-1.5 pack/day x 17 years. Has used tx with no relief. Not ready to quit.   occasional alcohol, drug use- marihuana daily      Cough- chronic. States for a long time but last 1-2 weeks worse and gets coughing fits- wants advair or another inhaler.   Also c.o of Vertigo- states chronic issue and has had maneuver done before to help.   Gets dizziness with head movement. Headache. Lights bother him. No hx migraines.   Ibuprofen helps. Worse 7-10 days.   Congestion. No ear pain but feel clogged. z pack last week helped.     Chronic testosterone use. Hct recently elevated. States aware and has been donating blood.   Gets testosterone without prescription and does not check levels. States takes for weight lifting and plans to take for another 1-2 years. Aware of side effects and risks.     Review of Systems   Constitutional: Negative for chills, fatigue and fever.   HENT: Positive for congestion. Negative for ear discharge, ear pain, postnasal drip, rhinorrhea, sinus pressure and sore throat.    Eyes: Negative for visual disturbance.   Respiratory: Negative.    Cardiovascular: Negative.    Gastrointestinal: Negative for constipation and diarrhea.   Genitourinary: Negative for difficulty urinating.   Neurological: Positive for dizziness and headaches. Negative for seizures.   Psychiatric/Behavioral: Negative for sleep disturbance.       Patient Active Problem List   Diagnosis   • Epilepsy (HCC)   • Vertigo   • Substance abuse (HCC)   • Hyperlipidemia   • Tinea pedis  of both feet   • Other headache syndrome   • Erythrocytosis   • Subarachnoid hemorrhage (HCC)   • Pneumothorax   • Numbness of upper extremity   • Cigarette smoker   • High risk medication use     Past Medical History:   Diagnosis Date   • Acute sinusitis    • ADD (attention deficit disorder)    • Carpal tunnel syndrome    • Chronic bronchitis (HCC)    • Epilepsy (HCC)    • Kidney stones    • Seizures (HCC)    • Subarachnoid hemorrhage (HCC)    • Tobacco abuse 1/16/2018   • Viral URI      Past Surgical History:   Procedure Laterality Date   • ABSCESS DRAINAGE       Family History   Problem Relation Age of Onset   • Diabetes Father    • Heart disease Father    • Hypertension Father    • Diabetes Sister    • Diabetes Paternal Grandmother    • Diabetes Mother      Social History     Socioeconomic History   • Marital status:    Tobacco Use   • Smoking status: Current Every Day Smoker     Packs/day: 2.00     Years: 20.00     Pack years: 40.00     Types: Cigarettes     Start date: 2002   • Smokeless tobacco: Former User     Quit date: 2018   Vaping Use   • Vaping Use: Never used   Substance and Sexual Activity   • Alcohol use: No   • Drug use: Yes     Types: Marijuana   • Sexual activity: Not Currently       Allergies   Allergen Reactions   • Amoxicillin Other (See Comments)     Paralysis   • Bactrim [Sulfamethoxazole-Trimethoprim] Seizure   • Doxycycline Other (See Comments)     Seizure/memory loss    *Pt states he has tolerated and that the seizure was caused by a steroid pack   • Other Other (See Comments)     PT STATES STEROID PACK CAUSED SEIZURE/MEMORY LOSS, UNSURE OF NAME       Current Outpatient Medications on File Prior to Visit   Medication Sig Dispense Refill   • albuterol sulfate  (90 Base) MCG/ACT inhaler Inhale 2 puffs Every 4 (Four) Hours As Needed.     • levETIRAcetam (KEPPRA) 1000 MG tablet Take 1 tablet by mouth Every 12 (Twelve) Hours. 180 tablet 1   • Testosterone Enanthate 200 MG/ML  "solution Inject  into the appropriate muscle as directed by prescriber.     • [DISCONTINUED] azithromycin (Zithromax) 250 MG tablet Take 2 tablets the first day, then 1 tablet daily for 4 days. 6 tablet 0   • [DISCONTINUED] carbamide peroxide (Debrox) 6.5 % otic solution Administer 5 drops into both ears 2 (Two) Times a Day. 22 mL 2     No current facility-administered medications on file prior to visit.       Objective   Vitals:    06/13/22 0828   BP: 112/68   Pulse: 75   SpO2: 99%   Weight: 85.3 kg (188 lb)   Height: 170.2 cm (67\")   PainSc: 0-No pain     Body mass index is 29.44 kg/m².  Physical Exam  Constitutional:       Appearance: Normal appearance. He is not ill-appearing.   HENT:      Head: Normocephalic.      Right Ear: No drainage, swelling or tenderness. There is impacted cerumen. Tympanic membrane is not perforated, erythematous or bulging.      Left Ear: No drainage, swelling or tenderness. There is impacted cerumen. Tympanic membrane is not perforated, erythematous or bulging.      Ears:      Comments: Ear canal normal post irrigation, still with some cerumen close to TM KERRY but no erythema, bulging, discharge.     Nose: Congestion present. No rhinorrhea.      Mouth/Throat:      Mouth: Mucous membranes are moist.      Pharynx: Oropharynx is clear. No oropharyngeal exudate or posterior oropharyngeal erythema.   Eyes:      Conjunctiva/sclera: Conjunctivae normal.   Neck:      Vascular: No carotid bruit.   Cardiovascular:      Rate and Rhythm: Normal rate and regular rhythm.      Pulses: Normal pulses.      Heart sounds: Normal heart sounds, S1 normal and S2 normal. No murmur heard.  Pulmonary:      Effort: Pulmonary effort is normal. No respiratory distress.      Breath sounds: Normal breath sounds.   Musculoskeletal:      Right lower leg: No edema.      Left lower leg: No edema.   Lymphadenopathy:      Head:      Right side of head: No tonsillar, preauricular or posterior auricular adenopathy.      " Left side of head: No tonsillar, preauricular or posterior auricular adenopathy.      Cervical: No cervical adenopathy.   Neurological:      General: No focal deficit present.      Mental Status: He is alert and oriented to person, place, and time.      Cranial Nerves: No dysarthria or facial asymmetry.      Gait: Gait is intact.   Psychiatric:         Attention and Perception: Attention normal.         Mood and Affect: Mood normal.         Speech: Speech normal.         Behavior: Behavior normal.         Thought Content: Thought content normal.         Cognition and Memory: Cognition normal.         Judgment: Judgment normal.       Ear Cerumen Removal    Date/Time: 6/13/2022 9:36 AM  Performed by: Mora Culp APRN  Authorized by: Mora Culp APRN   Consent: Verbal consent obtained.  Consent given by: patient  Patient understanding: patient states understanding of the procedure being performed  Patient identity confirmed: verbally with patient  Location details: left ear and right ear  Patient tolerance: patient tolerated the procedure well with no immediate complications  Procedure type: instrumentation, irrigation       Assessment & Plan   Diagnoses and all orders for this visit:    1. Congestion of paranasal sinus (Primary)  -     montelukast (Singulair) 10 MG tablet; Take 1 tablet by mouth Every Night.  Dispense: 90 tablet; Refill: 0    2. Bilateral impacted cerumen  -     Cerumen Removal  3. Vertigo        -     Thinks symptoms r/t to congestion and cerumen impaction.hopefully better after irrigation, pt has debrox at home but has not been using. Advised to use 1-2 times daily, if causes dizziness use at HS. Will also give Singulair to use as think allergy/infammation main cause of symptoms and states antihistamine not effective. Use discussed.   Also informed inhaler tx such as advair mainly for asthma/copd. Offered PFTs if cont to have symptoms but defers.  RTC in 1-2 weeks if no improvement.     4.  Long-term current use of testosterone replacement therapy       -     Discussed risks of testosterone use specially if no low testosterone. Pt states aware of SE and risks and will be careful. Keep close eye on HCT and advised phlebotomy.        - Smoking cessation discussed. Also may contribute to erythrocytosis. States not ready to quit at this time.     -Follow up: 6 months, sooner as needed. Advised to come back in 3 months for cbc will call if wants labs checked.        - Pt agrees with plan of care and states no further concerns or questions today    This document is intended for medical expert use only. Reading of this document by patients and/or patient's family without participating medical staff guidance may result in misinterpretation and unintended morbidity.  Any interpretation of such data is the responsibility of the patient and/or family member responsible for the patient in concert with their primary or specialist providers, not to be left for sources of online searches such as [employer], [employer] or similar queries. Relying on these approaches to knowledge may result in misinterpretation, misguided goals of care and even death should patients or family members try recommendations outside of the realm of professional medical care in a supervised way.     Please allow 3-5 business days for recommendations based on new results     Go to the ER for any possible lifethreatening symptoms such as chest pain or shortness of air.      I personally spent 30  minutes with this patient, preparing for the visit, reviewing tests, obtaining and/or reviewing a separately obtained history, performing a medically appropriate examination and/or evaluation , counseling and educating the patient/family/caregiver, ordering medications, tests, or procedures, documenting information in the medical record and independently interpreting results.

## 2022-06-14 NOTE — TELEPHONE ENCOUNTER
PATIENT SAID HE WILL NEED HIS BLOOD WORK IN October ON THE 24TH. I DON'T KNOW IF YOU CAN PUT IN NOW OR WAIT TILL HE COMES. PLEASE ADVISE

## 2022-06-15 DIAGNOSIS — F17.210 CIGARETTE SMOKER: ICD-10-CM

## 2022-06-15 DIAGNOSIS — E78.49 OTHER HYPERLIPIDEMIA: ICD-10-CM

## 2022-06-15 DIAGNOSIS — D75.1 ERYTHROCYTOSIS: Primary | ICD-10-CM

## 2022-06-15 DIAGNOSIS — Z13.1 DIABETES MELLITUS SCREENING: ICD-10-CM

## 2022-06-15 DIAGNOSIS — G40.509 NONINTRACTABLE EPILEPSY DUE TO EXTERNAL CAUSES, WITHOUT STATUS EPILEPTICUS: ICD-10-CM

## 2022-06-15 DIAGNOSIS — F41.8 MIXED ANXIETY AND DEPRESSIVE DISORDER: ICD-10-CM

## 2022-07-26 ENCOUNTER — OFFICE VISIT (OUTPATIENT)
Dept: FAMILY MEDICINE CLINIC | Facility: CLINIC | Age: 37
End: 2022-07-26

## 2022-07-26 VITALS
DIASTOLIC BLOOD PRESSURE: 76 MMHG | HEART RATE: 93 BPM | WEIGHT: 180 LBS | BODY MASS INDEX: 28.25 KG/M2 | HEIGHT: 67 IN | SYSTOLIC BLOOD PRESSURE: 122 MMHG | OXYGEN SATURATION: 99 %

## 2022-07-26 DIAGNOSIS — F17.210 CIGARETTE SMOKER: ICD-10-CM

## 2022-07-26 DIAGNOSIS — M79.642 BILATERAL HAND PAIN: Primary | ICD-10-CM

## 2022-07-26 DIAGNOSIS — M79.641 BILATERAL HAND PAIN: Primary | ICD-10-CM

## 2022-07-26 PROCEDURE — 99213 OFFICE O/P EST LOW 20 MIN: CPT

## 2022-07-26 RX ORDER — ALBUTEROL SULFATE 90 UG/1
2 AEROSOL, METERED RESPIRATORY (INHALATION) EVERY 4 HOURS PRN
Qty: 8 G | Refills: 1 | Status: SHIPPED | OUTPATIENT
Start: 2022-07-26

## 2022-07-26 NOTE — PROGRESS NOTES
"Subjective   Garrick Guo is a 36 y.o. male. Who presents with complaints of hand pain      History of Present Illness     KERRY hand pain   Mostly on lateral side- by pinky fingers and feels pain shooting up arm    Changed exercise routine and noticed hand pain KERRY on Saturday and worse on Sunday  No bruising, swelling, redness. ROM intact but pain with certain movements. occasional numbness  Started doing [place] carries.   Now Unable to lift as much weight, every time tries feels like hand giving out.   Takes ibuprofen regularly- states for \"inflammation of lungs\"due to smoking.   Has not tried anything for hands.     Smoking 1 pack/day- not ready to quit      The following portions of the patient's history were reviewed and updated as appropriate: allergies, current medications, past family history, past medical history, past social history and past surgical history.    Review of Systems   Constitutional: Negative for chills and fever.   Musculoskeletal: Positive for arthralgias and myalgias. Negative for joint swelling.   Neurological: Positive for weakness and numbness.       Objective    /76   Pulse 93   Ht 170.2 cm (67\")   Wt 81.6 kg (180 lb)   SpO2 99%   BMI 28.19 kg/m²     Physical Exam  Constitutional:       Appearance: Normal appearance. He is not ill-appearing.   Pulmonary:      Effort: Pulmonary effort is normal. No respiratory distress.   Musculoskeletal:      Right forearm: No swelling or tenderness.      Left forearm: No swelling or tenderness.      Right wrist: No swelling, tenderness, bony tenderness or snuff box tenderness. Normal range of motion.      Left wrist: No swelling, tenderness, bony tenderness or snuff box tenderness. Normal range of motion.      Right hand: Tenderness (index finger most tender) present. No swelling, deformity or bony tenderness. Normal range of motion. Normal strength. Normal sensation. Normal capillary refill. Normal pulse.      Left hand: " Tenderness (pinky finger most tender) present. No swelling, deformity or bony tenderness. Normal range of motion. Normal strength. Normal sensation. Normal capillary refill. Normal pulse.   Neurological:      Mental Status: He is alert.   Psychiatric:         Attention and Perception: Attention normal.         Mood and Affect: Mood normal.         Speech: Speech normal.         Behavior: Behavior normal.         Thought Content: Thought content normal.         Cognition and Memory: Cognition normal.         Judgment: Judgment normal.       Assessment & Plan   Diagnoses and all orders for this visit:    1. Bilateral hand pain (Primary)        -     Discussed symptoms most consistent with muscle strain vs tendonitis. Advised wrist brace, rest, ICE. Advised against regular use of NSAIDs as long term SE of kidney and stomach lining damage. Not indicated for lung inflammation. May use sparingly for hand pain.. do not think imaging needed at this time as no indication of fx. If no improvement may consider.     2. Cigarette smoker  -     albuterol sulfate  (90 Base) MCG/ACT inhaler; Inhale 2 puffs Every 4 (Four) Hours As Needed for Wheezing or Shortness of Air.  Dispense: 8 g; Refill: 1  -     Advised smoking cessation- not ready at this time. Will consider    Follow up as needed     - Pt agrees with plan of care and states no further concerns or questions today    This document is intended for medical expert use only. Reading of this document by patients and/or patient's family without participating medical staff guidance may result in misinterpretation and unintended morbidity.  Any interpretation of such data is the responsibility of the patient and/or family member responsible for the patient in concert with their primary or specialist providers, not to be left for sources of online searches such as [employer], [employer] or similar queries. Relying on these approaches to knowledge may result in misinterpretation,  misguided goals of care and even death should patients or family members try recommendations outside of the realm of professional medical care in a supervised way.     Please allow 3-5 business days for recommendations based on new results     Go to the ER for any possible lifethreatening symptoms such as chest pain or shortness of air.      I personally spent 20 minutes with this patient, preparing for the visit, reviewing tests, obtaining and/or reviewing a separately obtained history, performing a medically appropriate examination and/or evaluation , counseling and educating the patient/family/caregiver, ordering medications, tests, or procedures, documenting information in the medical record and independently interpreting results.

## 2022-10-10 ENCOUNTER — TELEPHONE (OUTPATIENT)
Dept: FAMILY MEDICINE CLINIC | Facility: CLINIC | Age: 37
End: 2022-10-10

## 2022-10-10 DIAGNOSIS — J06.9 URI WITH COUGH AND CONGESTION: Primary | ICD-10-CM

## 2022-10-10 RX ORDER — GUAIFENESIN AND DEXTROMETHORPHAN HYDROBROMIDE 600; 30 MG/1; MG/1
2 TABLET, EXTENDED RELEASE ORAL 2 TIMES DAILY PRN
Qty: 21 TABLET | Refills: 0 | Status: SHIPPED | OUTPATIENT
Start: 2022-10-10 | End: 2022-11-22

## 2022-10-10 RX ORDER — AZITHROMYCIN 250 MG/1
TABLET, FILM COATED ORAL
Qty: 6 TABLET | Refills: 0 | Status: SHIPPED | OUTPATIENT
Start: 2022-10-10 | End: 2022-11-22

## 2022-10-25 ENCOUNTER — TELEPHONE (OUTPATIENT)
Dept: FAMILY MEDICINE CLINIC | Facility: CLINIC | Age: 37
End: 2022-10-25

## 2022-10-25 NOTE — TELEPHONE ENCOUNTER
I advised pt go to [facility] to be seen today and keep follow up with [facility] on 11/22. I also told him [facility] works UC Saturday and he can just walk in there and [facility] will see him there if needed. Pt said okay.

## 2022-10-25 NOTE — TELEPHONE ENCOUNTER
Pt was sent in medication by [facility] for the symptoms below. They are still not better and was told to call for appt. I advised pt to go to  but wanted to inquire if we could order a chest xray or bloodwork to see what's going on.     Symptoms are coughing and congestion which is causing vertigo and dizziness.      Please advise       Pt can be reached at [phone]

## 2022-11-17 ENCOUNTER — TELEPHONE (OUTPATIENT)
Dept: FAMILY MEDICINE CLINIC | Facility: CLINIC | Age: 37
End: 2022-11-17

## 2022-11-17 DIAGNOSIS — G40.509 NONINTRACTABLE EPILEPSY DUE TO EXTERNAL CAUSES, WITHOUT STATUS EPILEPTICUS: ICD-10-CM

## 2022-11-17 RX ORDER — LEVETIRACETAM 1000 MG/1
1000 TABLET ORAL EVERY 12 HOURS
Qty: 30 TABLET | Refills: 0 | Status: SHIPPED | OUTPATIENT
Start: 2022-11-17 | End: 2022-11-22 | Stop reason: SDUPTHER

## 2022-11-22 ENCOUNTER — OFFICE VISIT (OUTPATIENT)
Dept: FAMILY MEDICINE CLINIC | Facility: CLINIC | Age: 37
End: 2022-11-22

## 2022-11-22 VITALS
OXYGEN SATURATION: 98 % | SYSTOLIC BLOOD PRESSURE: 138 MMHG | HEIGHT: 67 IN | BODY MASS INDEX: 29.82 KG/M2 | HEART RATE: 79 BPM | RESPIRATION RATE: 18 BRPM | WEIGHT: 190 LBS | TEMPERATURE: 97.3 F | DIASTOLIC BLOOD PRESSURE: 72 MMHG

## 2022-11-22 DIAGNOSIS — R74.8 ELEVATED LIVER ENZYMES: ICD-10-CM

## 2022-11-22 DIAGNOSIS — D72.829 LEUKOCYTOSIS, UNSPECIFIED TYPE: ICD-10-CM

## 2022-11-22 DIAGNOSIS — J01.10 SUBACUTE FRONTAL SINUSITIS: Primary | ICD-10-CM

## 2022-11-22 DIAGNOSIS — G40.509 NONINTRACTABLE EPILEPSY DUE TO EXTERNAL CAUSES, WITHOUT STATUS EPILEPTICUS: ICD-10-CM

## 2022-11-22 DIAGNOSIS — R06.2 WHEEZING: ICD-10-CM

## 2022-11-22 DIAGNOSIS — F17.218 CIGARETTE NICOTINE DEPENDENCE WITH OTHER NICOTINE-INDUCED DISORDER: ICD-10-CM

## 2022-11-22 PROCEDURE — 99214 OFFICE O/P EST MOD 30 MIN: CPT

## 2022-11-22 RX ORDER — AZITHROMYCIN 250 MG/1
TABLET, FILM COATED ORAL
Qty: 6 TABLET | Refills: 0 | Status: SHIPPED | OUTPATIENT
Start: 2022-11-22 | End: 2023-01-11

## 2022-11-22 RX ORDER — LEVETIRACETAM 1000 MG/1
1000 TABLET ORAL EVERY 12 HOURS
Qty: 60 TABLET | Refills: 1 | Status: SHIPPED | OUTPATIENT
Start: 2022-11-22 | End: 2023-02-14 | Stop reason: SDUPTHER
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
## 2022-12-02 ENCOUNTER — HOSPITAL ENCOUNTER (OUTPATIENT)
Dept: RESPIRATORY THERAPY | Facility: HOSPITAL | Age: 37
Discharge: HOME | End: 2022-12-02

## 2022-12-02 DIAGNOSIS — R06.2 WHEEZING: ICD-10-CM

## 2022-12-02 DIAGNOSIS — F17.218 CIGARETTE NICOTINE DEPENDENCE WITH OTHER NICOTINE-INDUCED DISORDER: ICD-10-CM

## 2022-12-02 PROCEDURE — 94640 AIRWAY INHALATION TREATMENT: CPT

## 2022-12-02 PROCEDURE — 94726 PLETHYSMOGRAPHY LUNG VOLUMES: CPT

## 2022-12-02 PROCEDURE — 94060 EVALUATION OF WHEEZING: CPT

## 2022-12-02 RX ORDER — ALBUTEROL SULFATE 2.5 MG/3ML
2.5 SOLUTION RESPIRATORY (INHALATION) ONCE
Status: COMPLETED | OUTPATIENT
Start: 2022-12-02 | End: 2022-12-02

## 2022-12-02 RX ADMIN — ALBUTEROL SULFATE 2.5 MG: 2.5 SOLUTION RESPIRATORY (INHALATION) at 08:43

## 2022-12-07 NOTE — PROGRESS NOTES
Please inform pt that his lung function test was normal. Thankfully no changes in lung function. Would limit or avoid smoking to prevent further symptoms.

## 2023-01-11 ENCOUNTER — OFFICE VISIT (OUTPATIENT)
Dept: FAMILY MEDICINE CLINIC | Facility: CLINIC | Age: 38
End: 2023-01-11
Payer: COMMERCIAL

## 2023-01-11 ENCOUNTER — TELEPHONE (OUTPATIENT)
Dept: FAMILY MEDICINE CLINIC | Facility: CLINIC | Age: 38
End: 2023-01-11

## 2023-01-11 VITALS
SYSTOLIC BLOOD PRESSURE: 138 MMHG | OXYGEN SATURATION: 96 % | TEMPERATURE: 97.3 F | BODY MASS INDEX: 29.19 KG/M2 | HEART RATE: 90 BPM | WEIGHT: 186 LBS | RESPIRATION RATE: 18 BRPM | DIASTOLIC BLOOD PRESSURE: 62 MMHG | HEIGHT: 67 IN

## 2023-01-11 DIAGNOSIS — R19.4 BOWEL HABIT CHANGES: ICD-10-CM

## 2023-01-11 DIAGNOSIS — R10.11 RIGHT UPPER QUADRANT ABDOMINAL PAIN: Primary | ICD-10-CM

## 2023-01-11 DIAGNOSIS — E65 LIPOHYPERTROPHY: ICD-10-CM

## 2023-01-11 DIAGNOSIS — R39.9 URINARY SYMPTOM OR SIGN: ICD-10-CM

## 2023-01-11 DIAGNOSIS — S39.92XA INJURY OF BUTTOCK, INITIAL ENCOUNTER: ICD-10-CM

## 2023-01-11 LAB
BILIRUB BLD-MCNC: NEGATIVE MG/DL
CLARITY, POC: CLEAR
COLOR UR: NORMAL
EXPIRATION DATE: NORMAL
GLUCOSE UR STRIP-MCNC: NEGATIVE MG/DL
KETONES UR QL: NEGATIVE
LEUKOCYTE EST, POC: NEGATIVE
Lab: NORMAL
NITRITE UR-MCNC: NEGATIVE MG/ML
PH UR: 6 [PH] (ref 5–8)
PROT UR STRIP-MCNC: NEGATIVE MG/DL
RBC # UR STRIP: NEGATIVE /UL
SP GR UR: 1.03 (ref 1–1.03)
UROBILINOGEN UR QL: NORMAL

## 2023-01-11 PROCEDURE — 81003 URINALYSIS AUTO W/O SCOPE: CPT

## 2023-01-11 PROCEDURE — 99214 OFFICE O/P EST MOD 30 MIN: CPT

## 2023-01-11 NOTE — PROGRESS NOTES
"Subjective   Garrick Guo is a 37 y.o. male. Who presents with complaints of abd and flank pain      History of Present Illness     Pain in R flank area and radiates to lower abdomen. Intermittent. Sometimes sharp and lasts for a while.  Has been having pain after having a BM and feels like does not empty completely.- usually good BMs as high fiber diet.     No nausea, had a fever 100.3 a few days ago.   No diarrhea. No constipation but feels hard. Strain more than usual. No melena or hematochezia.   High fibr diet and drinks a lot of water daily- approx 5 16-20 oz bottles/day  Urine has been different. Sometimes urgency and frequency, sometimes hesitancy and little urine. No pelvic pain.     No family hx colorectal, GI or prostate ca    Has not lifted weights in a couple of days.   Does not think is usual back pain or sciatica. This feels different.     Noted a bump on buttock this am when getting steroid shot. No pain.    Getting \"Anavar\" currently. Aware of SE of steroids.     The following portions of the patient's history were reviewed and updated as appropriate: allergies, current medications, past family history, past medical history, past social history and past surgical history.    Review of Systems   Constitutional: Positive for chills and fever. Negative for unexpected weight loss.   Respiratory: Negative.    Cardiovascular: Negative.    Gastrointestinal: Positive for abdominal pain, constipation and rectal pain. Negative for anal bleeding, blood in stool, diarrhea, nausea, vomiting, GERD and indigestion.   Genitourinary: Positive for difficulty urinating, flank pain, frequency and urgency. Negative for dysuria, hematuria, nocturia and testicular pain.   Musculoskeletal: Positive for back pain and myalgias.       Objective    /62   Pulse 90   Temp 97.3 °F (36.3 °C) (Temporal)   Resp 18   Ht 170.2 cm (67\")   Wt 84.4 kg (186 lb)   SpO2 96%   BMI 29.13 kg/m²     Physical " Exam  Constitutional:       Appearance: Normal appearance. He is not ill-appearing.   Cardiovascular:      Rate and Rhythm: Normal rate and regular rhythm.      Pulses: Normal pulses.      Heart sounds: Normal heart sounds, S1 normal and S2 normal. No murmur heard.  Pulmonary:      Effort: Pulmonary effort is normal. No respiratory distress.      Breath sounds: Normal breath sounds.   Abdominal:      General: Bowel sounds are normal. There is no distension.      Palpations: There is no mass.      Tenderness: There is no abdominal tenderness. There is no guarding or rebound.      Hernia: No hernia is present.   Musculoskeletal:      Thoracic back: Normal.      Lumbar back: No spasms, tenderness or bony tenderness. Normal range of motion.        Back:    Skin:         Neurological:      General: No focal deficit present.      Mental Status: He is alert and oriented to person, place, and time.      Cranial Nerves: No dysarthria.      Gait: Gait is intact.   Psychiatric:         Attention and Perception: Attention normal.         Mood and Affect: Mood normal.         Speech: Speech normal.         Behavior: Behavior normal.         Thought Content: Thought content normal.         Cognition and Memory: Cognition normal.         Judgment: Judgment normal.         Results for orders placed or performed in visit on 01/11/23   POCT urinalysis dipstick, automated    Specimen: Urine   Result Value Ref Range    Color Dark Yellow Yellow, Straw, Dark Yellow, Nasreen    Clarity, UA Clear Clear    Specific Gravity  1.030 1.005 - 1.030    pH, Urine 6.0 5.0 - 8.0    Leukocytes Negative Negative    Nitrite, UA Negative Negative    Protein, POC Negative Negative mg/dL    Glucose, UA Negative Negative mg/dL    Ketones, UA Negative Negative    Urobilinogen, UA 0.2 E.U./dL Normal, 0.2 E.U./dL    Bilirubin Negative Negative    Blood, UA Negative Negative    Lot Number [id number]     Expiration Date 12,212,023        Assessment & Plan    Diagnoses and all orders for this visit:    1. Right upper quadrant abdominal pain (Primary)  -     POCT urinalysis dipstick, automated  -     Comprehensive metabolic panel  -     CBC w AUTO Differential  -     Hepatitis panel, acute    2. Bowel habit changes  -     Comprehensive metabolic panel  -     CBC w AUTO Differential  -     Hepatitis panel, acute    -     Unsure of cause- normal exam. Discussed diff gastroenteritis- although not typical symptoms vs MSK vs kidney stone vs other - unsure why fever?   concern for bowel and urinary changes. UA normal. Will get to bring sample for FOBT. Supplies and instructions provided.   Check labs.   Add metamucil or miralax and increase water intake.     If no improvement in symptoms and labs normal would consider CT abd vs GI referral depending on symptoms that persist.     3. Urinary symptom or sign  -     POCT urinalysis dipstick, automated  -     PSA DIAGNOSTIC ONLY    4. 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 No symptoms. Monitor. Rotate site. Warm compression PRN.             Follow up as needed. Will advise further based on results.     If severe or worsening symptoms go to ED     - Pt agrees with plan of care and states no further concerns or questions today    This document is intended for medical expert use only. Reading of this document by patients and/or patient's family without participating medical staff guidance may result in misinterpretation and unintended morbidity.  Any interpretation of such data is the responsibility of the patient and/or family member responsible for the patient in concert with their primary or specialist providers, not to be left for sources of online searches such as [employer], [employer] or similar queries. Relying on these approaches to knowledge may result in misinterpretation, misguided goals of care and even death should patients or family members try recommendations outside of  the realm of professional medical care in a supervised way.     Please allow 3-5 business days for recommendations based on new results     Go to the ER for any possible lifethreatening symptoms such as chest pain or shortness of air.      I personally spent 30 minutes with this patient, preparing for the visit, reviewing tests, obtaining and/or reviewing a separately obtained history, performing a medically appropriate examination and/or evaluation , counseling and educating the patient/family/caregiver, ordering medications, tests, or procedures, documenting information in the medical record and independently interpreting results.

## 2023-01-11 NOTE — TELEPHONE ENCOUNTER
PLEASE CANCEL NURSE VISIT FOR 1/12/23 HE BROUGHT IN THE STOOL SAMPLE TODAY    CALL BACK NUMBER [phone]    ATTEMPTED WARM TRANSFER

## 2023-01-12 ENCOUNTER — CLINICAL SUPPORT (OUTPATIENT)
Dept: FAMILY MEDICINE CLINIC | Facility: CLINIC | Age: 38
End: 2023-01-12
Payer: COMMERCIAL

## 2023-01-12 DIAGNOSIS — R73.03 PREDIABETES: Primary | ICD-10-CM

## 2023-01-12 DIAGNOSIS — R71.8 HIGH HEMATOCRIT: ICD-10-CM

## 2023-01-12 DIAGNOSIS — E16.2 LOW BLOOD GLUCOSE MEASUREMENT: ICD-10-CM

## 2023-01-12 DIAGNOSIS — Z79.899 HIGH RISK MEDICATION USE: ICD-10-CM

## 2023-01-12 DIAGNOSIS — R19.5 POSITIVE OCCULT STOOL BLOOD TEST: Primary | ICD-10-CM

## 2023-01-12 DIAGNOSIS — Z12.11 COLON CANCER SCREENING: Primary | ICD-10-CM

## 2023-01-12 DIAGNOSIS — E16.2 LOW BLOOD GLUCOSE MEASUREMENT: Primary | ICD-10-CM

## 2023-01-12 DIAGNOSIS — R42 VERTIGO: Primary | ICD-10-CM

## 2023-01-12 DIAGNOSIS — R19.4 BOWEL HABIT CHANGES: ICD-10-CM

## 2023-01-12 LAB
ALBUMIN SERPL-MCNC: 4.8 G/DL (ref 3.5–5.2)
ALBUMIN/GLOB SERPL: 2.4 G/DL
ALP SERPL-CCNC: 74 U/L (ref 39–117)
ALT SERPL-CCNC: 29 U/L (ref 1–41)
AST SERPL-CCNC: 27 U/L (ref 1–40)
BASOPHILS # BLD AUTO: 0.06 10*3/MM3 (ref 0–0.2)
BASOPHILS NFR BLD AUTO: 0.5 % (ref 0–1.5)
BILIRUB SERPL-MCNC: 0.4 MG/DL (ref 0–1.2)
BUN SERPL-MCNC: 18 MG/DL (ref 6–20)
BUN/CREAT SERPL: 20 (ref 7–25)
CALCIUM SERPL-MCNC: 10.1 MG/DL (ref 8.6–10.5)
CHLORIDE SERPL-SCNC: 98 MMOL/L (ref 98–107)
CO2 SERPL-SCNC: 23 MMOL/L (ref 22–29)
CREAT SERPL-MCNC: 0.9 MG/DL (ref 0.76–1.27)
EGFRCR SERPLBLD CKD-EPI 2021: 112.1 ML/MIN/1.73
EOSINOPHIL # BLD AUTO: 0.26 10*3/MM3 (ref 0–0.4)
EOSINOPHIL NFR BLD AUTO: 2.4 % (ref 0.3–6.2)
ERYTHROCYTE [DISTWIDTH] IN BLOOD BY AUTOMATED COUNT: 15 % (ref 12.3–15.4)
EXPIRATION DATE: ABNORMAL
GASTROCULT GAST QL: POSITIVE
GLOBULIN SER CALC-MCNC: 2 GM/DL
GLUCOSE SERPL-MCNC: 41 MG/DL (ref 65–99)
HAV IGM SERPL QL IA: NEGATIVE
HBV CORE IGM SERPL QL IA: NEGATIVE
HBV SURFACE AG SERPL QL IA: NEGATIVE
HCT VFR BLD AUTO: 56.9 % (ref 37.5–51)
HCV AB S/CO SERPL IA: <0.1 S/CO RATIO (ref 0–0.9)
HCV AB SERPL QL IA: NORMAL
HGB BLD-MCNC: 18.8 G/DL (ref 13–17.7)
IMM GRANULOCYTES # BLD AUTO: 0.04 10*3/MM3 (ref 0–0.05)
IMM GRANULOCYTES NFR BLD AUTO: 0.4 % (ref 0–0.5)
LYMPHOCYTES # BLD AUTO: 1.98 10*3/MM3 (ref 0.7–3.1)
LYMPHOCYTES NFR BLD AUTO: 18 % (ref 19.6–45.3)
Lab: ABNORMAL
MCH RBC QN AUTO: 30.2 PG (ref 26.6–33)
MCHC RBC AUTO-ENTMCNC: 33 G/DL (ref 31.5–35.7)
MCV RBC AUTO: 91.5 FL (ref 79–97)
MONOCYTES # BLD AUTO: 0.96 10*3/MM3 (ref 0.1–0.9)
MONOCYTES NFR BLD AUTO: 8.7 % (ref 5–12)
NEUTROPHILS # BLD AUTO: 7.69 10*3/MM3 (ref 1.7–7)
NEUTROPHILS NFR BLD AUTO: 70 % (ref 42.7–76)
NRBC BLD AUTO-RTO: 0 /100 WBC (ref 0–0.2)
PLATELET # BLD AUTO: 274 10*3/MM3 (ref 140–450)
POTASSIUM SERPL-SCNC: 4.9 MMOL/L (ref 3.5–5.2)
PROT SERPL-MCNC: 6.8 G/DL (ref 6–8.5)
PSA SERPL-MCNC: 0.62 NG/ML (ref 0–4)
RBC # BLD AUTO: 6.22 10*6/MM3 (ref 4.14–5.8)
SODIUM SERPL-SCNC: 142 MMOL/L (ref 136–145)
WBC # BLD AUTO: 10.99 10*3/MM3 (ref 3.4–10.8)

## 2023-01-12 PROCEDURE — 82274 ASSAY TEST FOR BLOOD FECAL: CPT

## 2023-01-12 RX ORDER — BLOOD-GLUCOSE METER
EACH MISCELLANEOUS
Qty: 1 KIT | Refills: 0 | Status: SHIPPED | OUTPATIENT
Start: 2023-01-12

## 2023-01-12 RX ORDER — BLOOD-GLUCOSE METER
EACH MISCELLANEOUS
COMMUNITY
End: 2023-01-12 | Stop reason: SDUPTHER

## 2023-01-12 RX ORDER — LANCETS
EACH MISCELLANEOUS 2 TIMES DAILY
COMMUNITY
End: 2023-01-12 | Stop reason: SDUPTHER

## 2023-01-12 RX ORDER — LANCETS
EACH MISCELLANEOUS
Qty: 100 EACH | Refills: 3 | Status: SHIPPED | OUTPATIENT
Start: 2023-01-12

## 2023-01-12 NOTE — PROGRESS NOTES
Please CALL pt and inform of abnormal labs    Blood sugar was very low - is he feeling ok?   Get a glucometer and start checking at home- let us know if continues to have low blood sugars- should be higher than 60.  Make sure eating well    Hemoglobin and hematocrit very high- this increases risk for blood clots, heart attack, stroke, etc., would stop steroids. Make sure goes and donates blood ASAP    Repeat CBC, AC1 and CMP in 2 weeks. Will place orders to stop by lab.     Talia- also there is another note on abnormal stool test.

## 2023-01-12 NOTE — PROGRESS NOTES
Please call pt with abnormal results    His stool test came back positive for blood. I have sent referral to see gastro as may need scope. Should get a call to schedule.

## 2023-01-27 LAB
ALBUMIN SERPL-MCNC: 4.6 G/DL (ref 4–5)
ALBUMIN/GLOB SERPL: 2 {RATIO} (ref 1.2–2.2)
ALP SERPL-CCNC: 74 IU/L (ref 44–121)
ALT SERPL-CCNC: 32 IU/L (ref 0–44)
AST SERPL-CCNC: 31 IU/L (ref 0–40)
BASOPHILS # BLD AUTO: 0.1 X10E3/UL (ref 0–0.2)
BASOPHILS NFR BLD AUTO: 1 %
BILIRUB SERPL-MCNC: 0.4 MG/DL (ref 0–1.2)
BUN SERPL-MCNC: 19 MG/DL (ref 6–20)
BUN/CREAT SERPL: 18 (ref 9–20)
CALCIUM SERPL-MCNC: 9.5 MG/DL (ref 8.7–10.2)
CHLORIDE SERPL-SCNC: 102 MMOL/L (ref 96–106)
CO2 SERPL-SCNC: 21 MMOL/L (ref 20–29)
CREAT SERPL-MCNC: 1.05 MG/DL (ref 0.76–1.27)
EGFRCR SERPLBLD CKD-EPI 2021: 93 ML/MIN/1.73
EOSINOPHIL # BLD AUTO: 0.4 X10E3/UL (ref 0–0.4)
EOSINOPHIL NFR BLD AUTO: 4 %
ERYTHROCYTE [DISTWIDTH] IN BLOOD BY AUTOMATED COUNT: 14.5 % (ref 11.6–15.4)
GLOBULIN SER CALC-MCNC: 2.3 G/DL (ref 1.5–4.5)
GLUCOSE SERPL-MCNC: 84 MG/DL (ref 70–99)
HAV IGM SERPL QL IA: NEGATIVE
HBA1C MFR BLD: 5.3 % (ref 4.8–5.6)
HBV CORE IGM SERPL QL IA: NEGATIVE
HBV SURFACE AG SERPL QL IA: NEGATIVE
HCT VFR BLD AUTO: 56.6 % (ref 37.5–51)
HCV AB S/CO SERPL IA: <0.1 S/CO RATIO (ref 0–0.9)
HCV AB SERPL QL IA: NORMAL
HGB BLD-MCNC: 18.8 G/DL (ref 13–17.7)
IMM GRANULOCYTES # BLD AUTO: 0 X10E3/UL (ref 0–0.1)
IMM GRANULOCYTES NFR BLD AUTO: 0 %
LYMPHOCYTES # BLD AUTO: 2.1 X10E3/UL (ref 0.7–3.1)
LYMPHOCYTES NFR BLD AUTO: 21 %
MCH RBC QN AUTO: 30 PG (ref 26.6–33)
MCHC RBC AUTO-ENTMCNC: 33.2 G/DL (ref 31.5–35.7)
MCV RBC AUTO: 90 FL (ref 79–97)
MONOCYTES # BLD AUTO: 1 X10E3/UL (ref 0.1–0.9)
MONOCYTES NFR BLD AUTO: 10 %
NEUTROPHILS # BLD AUTO: 6.4 X10E3/UL (ref 1.4–7)
NEUTROPHILS NFR BLD AUTO: 64 %
PLATELET # BLD AUTO: 282 X10E3/UL (ref 150–450)
POTASSIUM SERPL-SCNC: 4.4 MMOL/L (ref 3.5–5.2)
PROT SERPL-MCNC: 6.9 G/DL (ref 6–8.5)
RBC # BLD AUTO: 6.27 X10E6/UL (ref 4.14–5.8)
SODIUM SERPL-SCNC: 138 MMOL/L (ref 134–144)
WBC # BLD AUTO: 10 X10E3/UL (ref 3.4–10.8)

## 2023-01-27 NOTE — PROGRESS NOTES
Please call pt and inform that his white blood cells & blood sugar have improved back to normal  Liver and kidney function stable and hepatitis tests negative.     His red blood cells and hematocrit remain elevated- this increases risk for blood clots. This is most likely related to steroid use. Advise to go donate blood as soon as possible. Would hold on steroids and increase water intake.

## 2023-02-10 ENCOUNTER — OFFICE VISIT (OUTPATIENT)
Dept: GASTROENTEROLOGY | Facility: CLINIC | Age: 38
End: 2023-02-10
Payer: COMMERCIAL

## 2023-02-10 VITALS
WEIGHT: 187 LBS | OXYGEN SATURATION: 96 % | TEMPERATURE: 97.7 F | HEIGHT: 67 IN | HEART RATE: 70 BPM | BODY MASS INDEX: 29.35 KG/M2 | SYSTOLIC BLOOD PRESSURE: 128 MMHG | DIASTOLIC BLOOD PRESSURE: 80 MMHG

## 2023-02-10 DIAGNOSIS — R10.31 RIGHT LOWER QUADRANT ABDOMINAL PAIN: Chronic | ICD-10-CM

## 2023-02-10 DIAGNOSIS — R19.4 CHANGE IN BOWEL HABITS: Primary | Chronic | ICD-10-CM

## 2023-02-10 DIAGNOSIS — R19.5 POSITIVE FECAL OCCULT BLOOD TEST: ICD-10-CM

## 2023-02-10 PROCEDURE — 99214 OFFICE O/P EST MOD 30 MIN: CPT | Performed by: NURSE PRACTITIONER

## 2023-02-10 NOTE — PROGRESS NOTES
"Chief Complaint   Patient presents with   • change in bowel habits         History of Present Illness  37-yea soniad male presents to the office today for evaluation of change in bowel habits and positive fecal occult blood testing. He cannot see any overt blood in his stool. He reports that his BMs used to be very regular but now is having trouble with evacuating stool. He reports daily Bms. He reports that he eats about every 2 hours. He reports an average of 4-6 BMs per day. Stool is in the form of small nuggets. He does not feel that he fully empties. He reports a diet very high in fiber and protein. However, recently he has eaten more junk food, alcohol, and junk food. He denies any rectal bleeding. He did initially have some rectal pain, but that has resolved. He reports right lower abdominal cramping that radiates down into his right lower quadrant towards the middle of his pelvic area. Pain occurs during a BM, but not with every BM. He will also have pain when lifting heavy weight. He denies any weight loss, he has had some weight gain. He has tried MiraLax and states it gave him some diarrhea, and he still felt that he could not fully evacuate stool. He denies any family history of colon cancer. He smokes greater than 1 ppd.     He denies any heartburn, reflux, nausea, vomiting, or dysphagia.     Review of Systems   Constitutional: Negative for fever and unexpected weight change.   HENT: Negative for trouble swallowing.    Cardiovascular: Negative for chest pain.   Gastrointestinal: Positive for abdominal pain. Negative for abdominal distention, anal bleeding, blood in stool, constipation, diarrhea, nausea, rectal pain and vomiting.      Result Review :       Comprehensive Metabolic Panel (01/26/2023 13:52)  CBC w AUTO Differential (01/26/2023 13:52)  Hepatitis Panel, Acute (01/26/2023 13:52)    Vital Signs:   /80   Pulse 70   Temp 97.7 °F (36.5 °C)   Ht 170.2 cm (67\")   Wt 84.8 kg (187 lb)   SpO2 " 96%   BMI 29.29 kg/m²     Body mass index is 29.29 kg/m².     Physical Exam  Vitals reviewed.   Constitutional:       Appearance: Normal appearance.   HENT:      Head: Normocephalic.      Nose: Nose normal.      Mouth/Throat:      Mouth: Mucous membranes are moist.   Eyes:      General: No scleral icterus.     Extraocular Movements: Extraocular movements intact.   Cardiovascular:      Rate and Rhythm: Normal rate and regular rhythm.      Pulses: Normal pulses.      Heart sounds: Normal heart sounds.   Pulmonary:      Effort: Pulmonary effort is normal. No respiratory distress.      Breath sounds: Normal breath sounds.   Abdominal:      General: Abdomen is flat. Bowel sounds are normal. There is no distension.      Palpations: Abdomen is soft. There is no mass.      Tenderness: There is no abdominal tenderness. There is no guarding.   Musculoskeletal:         General: Normal range of motion.      Cervical back: Normal range of motion and neck supple.   Skin:     General: Skin is warm and dry.   Neurological:      General: No focal deficit present.      Mental Status: He is alert and oriented to person, place, and time.   Psychiatric:         Mood and Affect: Mood normal.         Behavior: Behavior normal.         Thought Content: Thought content normal.         Judgment: Judgment normal.       Assessment and Plan    Diagnoses and all orders for this visit:    1. Change in bowel habits (Primary)  -     Occult Blood X 3, Stool - Stool, Per Rectum; Future  -     Cancel: US Abdomen Complete; Future  -     CT Abdomen Pelvis With Contrast; Future    2. Positive fecal occult blood test  -     Occult Blood X 3, Stool - Stool, Per Rectum; Future  -     Cancel: US Abdomen Complete; Future  -     CT Abdomen Pelvis With Contrast; Future    3. Right lower quadrant abdominal pain  -     Occult Blood X 3, Stool - Stool, Per Rectum; Future  -     Cancel: US Abdomen Complete; Future  -     CT Abdomen Pelvis With Contrast;  Future          Patient Instructions   1.  We will recheck your stool for blood x 3. Collection kits provided.     2.   For further evaluation of RLQ abdominal pain we will order a CT scan of the abdomen and pelvis.     3.   Additional recommendations pending outcome of imaging and fecal occult blood testing.     4.   We recommend that you continue to eat Greek yogurt daily.      Discussion:    Colonoscopy was strongly encouraged for findings of positive fecal occult blood testing right lower quadrant abdominal Pain, but patient deferred.  We will proceed with CT scan for further evaluation of right lower quadrant abdominal pain and check fecal occult blood testing x3 to reassess results.   Additional recommendations pending outcome of findings.  Patient to continue high-fiber diet.  Patient verbalized understanding of above plan of care and is in agreement.  All questions answered and support provided.    EMR Dragon/Transcription Disclaimer:  This document has been Dictated utilizing Dragon dictation.

## 2023-02-10 NOTE — PATIENT INSTRUCTIONS
We will recheck your stool for blood x 3. Collection kits provided.     2.   For further evaluation of RLQ abdominal pain we will order a CT scan of the abdomen and pelvis.     3.   Additional recommendations pending outcome of imaging and fecal occult blood testing.     4.   We recommend that you continue to eat Greek yogurt daily.

## 2023-02-14 DIAGNOSIS — G40.509 NONINTRACTABLE EPILEPSY DUE TO EXTERNAL CAUSES, WITHOUT STATUS EPILEPTICUS: ICD-10-CM

## 2023-02-14 RX ORDER — LEVETIRACETAM 1000 MG/1
1000 TABLET ORAL EVERY 12 HOURS
Qty: 60 TABLET | Refills: 1 | Status: SHIPPED | OUTPATIENT
Start: 2023-02-14 | End: 2023-02-15 | Stop reason: SDUPTHER

## 2023-02-14 NOTE — TELEPHONE ENCOUNTER
Caller: Garrick Guo    Relationship: Self    Best call back number: [phone]    Requested Prescriptions:   Requested Prescriptions     Pending Prescriptions Disp Refills   • levETIRAcetam (KEPPRA) 1000 MG tablet 60 tablet 1     Sig: Take 1 tablet by mouth Every 12 (Twelve) Hours.        Pharmacy where request should be sent: [facility] [id number] [facility] [id number] [address] - [phone]  - [phone] FX     Additional details provided by patient: PATIENT STATES THAT THE PHARMACY FAXED OVER A REQUEST FOR THIS ON 02/09, BUT HAVE NOT HEARD ANYTHING. PLEASE ADVISE.   Does the patient have less than a 3 day supply:  [] Yes  [x] No    Would you like a call back once the refill request has been completed: [] Yes [x] No    If the office needs to give you a call back, can they leave a voicemail: [] Yes [x] No    Susana Newberry Rep   02/14/23 13:42 EST

## 2023-02-15 DIAGNOSIS — G40.509 NONINTRACTABLE EPILEPSY DUE TO EXTERNAL CAUSES, WITHOUT STATUS EPILEPTICUS: ICD-10-CM

## 2023-02-15 RX ORDER — LEVETIRACETAM 1000 MG/1
1000 TABLET ORAL EVERY 12 HOURS
Qty: 60 TABLET | Refills: 1 | Status: SHIPPED | OUTPATIENT
Start: 2023-02-15

## 2023-02-21 ENCOUNTER — TELEPHONE (OUTPATIENT)
Dept: FAMILY MEDICINE CLINIC | Facility: CLINIC | Age: 38
End: 2023-02-21
Payer: COMMERCIAL

## 2023-02-21 DIAGNOSIS — J06.9 UPPER RESPIRATORY TRACT INFECTION, UNSPECIFIED TYPE: Primary | ICD-10-CM

## 2023-02-21 DIAGNOSIS — J06.9 URI WITH COUGH AND CONGESTION: ICD-10-CM

## 2023-02-21 RX ORDER — AZITHROMYCIN 250 MG/1
TABLET, FILM COATED ORAL
Qty: 6 TABLET | Refills: 0 | Status: SHIPPED | OUTPATIENT
Start: 2023-02-21

## 2023-02-21 RX ORDER — DEXTROMETHORPHAN HYDROBROMIDE AND PROMETHAZINE HYDROCHLORIDE 15; 6.25 MG/5ML; MG/5ML
5 SYRUP ORAL 4 TIMES DAILY PRN
Qty: 118 ML | Refills: 0 | Status: SHIPPED | OUTPATIENT
Start: 2023-02-21

## 2023-02-21 NOTE — TELEPHONE ENCOUNTER
Caller: Macariokarinacharley Garrick LUIS    Relationship: Self    Best call back number: [phone]    What medication are you requesting: SOMETHING TO HELP WITH SYMPTOMS    What are your current symptoms: COUGH, CONGESTION, FEVER, CHILLS, HEADACHES, PHLEGM YELLOW/ LIME GREEN, SHORTNESS OF BREATH    How long have you been experiencing symptoms: COUGH (THREE WEEKS), FEVER THE LAST FEW DAYS.     Have you had these symptoms before:    [x] Yes  [] No    Have you been treated for these symptoms before:   [x] Yes  [] No    If a prescription is needed, what is your preferred pharmacy and phone number: [facility] [id number] [facility] [address] [address] [facility] - [phone]  - [phone] FX     Additional notes: PATIENT HAS BEEN TAKING MUSINEX DM 12  HOUR, TYLENOL (HAS NOT BEEN BREAKING THE FEVER QUICKLY) AND ADVIL ( BREAKS THE FEVER IN APPROX 30 MINS), ALBUTEROL INHALER EVERY FOUR HOURS.    PATIENT IS REQUESTING SOMETHING TO GET RID OF ALL OF HIS SYMPTOMS.    PATIENT REQUESTED AN APPT NOTHING UNTIL MARCH 13.  [facility] APPT THIS MORNING AND WAS ADVISED TO TALK WITH HIS PCP TO GET MEDS.    PATIENT STATED THAT SON IS SICK AS WELL AND PATIENTS SON WAS GIVEN AMOXICILLIN AND A STEROID.    PATIENT IS ALLERGIC TO AMOXICILLIN BUT WOULD LIKE TO HAVE SOMETHING SO THEY DON'T KEEP GIVING IT BACK AND FORTH TO ONE ANOTHER.

## 2023-02-21 NOTE — TELEPHONE ENCOUNTER
I have sent a zpack and cough medication.     Advise to rest and stay home. Drink plenty of fluids.  May use Tylenol as needed for low grade fever, sore throat or body aches.     symptoms of URI may persist for a few days to but should improve. Some symptoms such as cough may linger for 4 weeks or more.     If high fever, SOA or difficulty breathing, lethargy or chest pain go to ED.

## 2023-04-18 DIAGNOSIS — G40.509 NONINTRACTABLE EPILEPSY DUE TO EXTERNAL CAUSES, WITHOUT STATUS EPILEPTICUS: ICD-10-CM

## 2023-04-18 DIAGNOSIS — G40.509 NONINTRACTABLE EPILEPSY DUE TO EXTERNAL CAUSES, WITHOUT STATUS EPILEPTICUS: Primary | ICD-10-CM

## 2023-04-18 DIAGNOSIS — Z13.1 DIABETES MELLITUS SCREENING: ICD-10-CM

## 2023-04-18 RX ORDER — LEVETIRACETAM 1000 MG/1
1000 TABLET ORAL EVERY 12 HOURS
Qty: 60 TABLET | Refills: 1 | Status: SHIPPED | OUTPATIENT
Start: 2023-04-18

## 2023-04-19 LAB
ALBUMIN SERPL-MCNC: 4.3 G/DL (ref 3.5–5.2)
ALBUMIN/GLOB SERPL: 2 G/DL
ALP SERPL-CCNC: 57 U/L (ref 39–117)
ALT SERPL-CCNC: 27 U/L (ref 1–41)
AST SERPL-CCNC: 25 U/L (ref 1–40)
BASOPHILS # BLD AUTO: 0.05 10*3/MM3 (ref 0–0.2)
BASOPHILS NFR BLD AUTO: 0.7 % (ref 0–1.5)
BILIRUB SERPL-MCNC: 0.3 MG/DL (ref 0–1.2)
BUN SERPL-MCNC: 15 MG/DL (ref 6–20)
BUN/CREAT SERPL: 17.2 (ref 7–25)
CALCIUM SERPL-MCNC: 9.8 MG/DL (ref 8.6–10.5)
CHLORIDE SERPL-SCNC: 106 MMOL/L (ref 98–107)
CO2 SERPL-SCNC: 30.3 MMOL/L (ref 22–29)
CREAT SERPL-MCNC: 0.87 MG/DL (ref 0.76–1.27)
EGFRCR SERPLBLD CKD-EPI 2021: 113.3 ML/MIN/1.73
EOSINOPHIL # BLD AUTO: 0.43 10*3/MM3 (ref 0–0.4)
EOSINOPHIL NFR BLD AUTO: 5.8 % (ref 0.3–6.2)
ERYTHROCYTE [DISTWIDTH] IN BLOOD BY AUTOMATED COUNT: 14.4 % (ref 12.3–15.4)
GLOBULIN SER CALC-MCNC: 2.1 GM/DL
GLUCOSE SERPL-MCNC: 95 MG/DL (ref 65–99)
HBA1C MFR BLD: 5.3 % (ref 4.8–5.6)
HCT VFR BLD AUTO: 54.9 % (ref 37.5–51)
HGB BLD-MCNC: 18.6 G/DL (ref 13–17.7)
IMM GRANULOCYTES # BLD AUTO: 0.03 10*3/MM3 (ref 0–0.05)
IMM GRANULOCYTES NFR BLD AUTO: 0.4 % (ref 0–0.5)
LYMPHOCYTES # BLD AUTO: 1.78 10*3/MM3 (ref 0.7–3.1)
LYMPHOCYTES NFR BLD AUTO: 24 % (ref 19.6–45.3)
MCH RBC QN AUTO: 31.1 PG (ref 26.6–33)
MCHC RBC AUTO-ENTMCNC: 33.9 G/DL (ref 31.5–35.7)
MCV RBC AUTO: 91.8 FL (ref 79–97)
MONOCYTES # BLD AUTO: 0.76 10*3/MM3 (ref 0.1–0.9)
MONOCYTES NFR BLD AUTO: 10.2 % (ref 5–12)
NEUTROPHILS # BLD AUTO: 4.37 10*3/MM3 (ref 1.7–7)
NEUTROPHILS NFR BLD AUTO: 58.9 % (ref 42.7–76)
NRBC BLD AUTO-RTO: 0 /100 WBC (ref 0–0.2)
PLATELET # BLD AUTO: 249 10*3/MM3 (ref 140–450)
POTASSIUM SERPL-SCNC: 4.8 MMOL/L (ref 3.5–5.2)
PROT SERPL-MCNC: 6.4 G/DL (ref 6–8.5)
RBC # BLD AUTO: 5.98 10*6/MM3 (ref 4.14–5.8)
SODIUM SERPL-SCNC: 143 MMOL/L (ref 136–145)
WBC # BLD AUTO: 7.42 10*3/MM3 (ref 3.4–10.8)

## 2023-04-27 ENCOUNTER — TELEPHONE (OUTPATIENT)
Dept: FAMILY MEDICINE CLINIC | Facility: CLINIC | Age: 38
End: 2023-04-27
Payer: COMMERCIAL

## 2023-04-27 NOTE — TELEPHONE ENCOUNTER
Caller: Garrick Guo    Relationship: Self    Best call back number: [phone] (Mobile)    Caller requesting test results: PATIENT     What test was performed: LABS     When was the test performed: 04/18/23    Where was the test performed: IN OFFICE     Additional notes: PLEASE ADVISE.

## 2023-05-03 ENCOUNTER — TELEPHONE (OUTPATIENT)
Dept: FAMILY MEDICINE CLINIC | Facility: CLINIC | Age: 38
End: 2023-05-03
Payer: COMMERCIAL

## 2023-07-25 ENCOUNTER — OFFICE VISIT (OUTPATIENT)
Dept: FAMILY MEDICINE CLINIC | Facility: CLINIC | Age: 38
End: 2023-07-25
Payer: COMMERCIAL

## 2023-07-25 VITALS
HEART RATE: 70 BPM | SYSTOLIC BLOOD PRESSURE: 126 MMHG | DIASTOLIC BLOOD PRESSURE: 70 MMHG | HEIGHT: 67 IN | OXYGEN SATURATION: 98 % | WEIGHT: 174 LBS | BODY MASS INDEX: 27.31 KG/M2

## 2023-07-25 DIAGNOSIS — R56.9 SEIZURE: ICD-10-CM

## 2023-07-25 DIAGNOSIS — E78.5 DYSLIPIDEMIA: ICD-10-CM

## 2023-07-25 DIAGNOSIS — D75.1 POLYCYTHEMIA: Primary | ICD-10-CM

## 2023-07-25 DIAGNOSIS — G40.509 NONINTRACTABLE EPILEPSY DUE TO EXTERNAL CAUSES, WITHOUT STATUS EPILEPTICUS: ICD-10-CM

## 2023-07-25 PROCEDURE — 99214 OFFICE O/P EST MOD 30 MIN: CPT | Performed by: FAMILY MEDICINE

## 2023-07-25 RX ORDER — LEVETIRACETAM 1000 MG/1
1000 TABLET ORAL EVERY 12 HOURS
Qty: 180 TABLET | Refills: 3 | Status: SHIPPED | OUTPATIENT
Start: 2023-07-25

## 2023-07-25 NOTE — PATIENT INSTRUCTIONS
Calorie Counting for Weight Loss  Calories are units of energy. Your body needs a certain number of calories from food to keep going throughout the day. When you eat or drink more calories than your body needs, your body stores the extra calories mostly as fat. When you eat or drink fewer calories than your body needs, your body burns fat to get the energy it needs.  Calorie counting means keeping track of how many calories you eat and drink each day. Calorie counting can be helpful if you need to lose weight. If you eat fewer calories than your body needs, you should lose weight. Ask your health care provider what a healthy weight is for you.  For calorie counting to work, you will need to eat the right number of calories each day to lose a healthy amount of weight per week. A dietitian can help you figure out how many calories you need in a day and will suggest ways to reach your calorie goal.  A healthy amount of weight to lose each week is usually 1-2 lb (0.5-0.9 kg). This usually means that your daily calorie intake should be reduced by 500-750 calories.  Eating 1,200-1,500 calories a day can help most women lose weight.  Eating 1,500-1,800 calories a day can help most men lose weight.  What do I need to know about calorie counting?  Work with your health care provider or dietitian to determine how many calories you should get each day. To meet your daily calorie goal, you will need to:  Find out how many calories are in each food that you would like to eat. Try to do this before you eat.  Decide how much of the food you plan to eat.  Keep a food log. Do this by writing down what you ate and how many calories it had.  To successfully lose weight, it is important to balance calorie counting with a healthy lifestyle that includes regular activity.  Where do I find calorie information?  The number of calories in a food can be found on a Nutrition Facts label. If a food does not have a Nutrition Facts label, try to  look up the calories online or ask your dietitian for help.  Remember that calories are listed per serving. If you choose to have more than one serving of a food, you will have to multiply the calories per serving by the number of servings you plan to eat. For example, the label on a package of bread might say that a serving size is 1 slice and that there are 90 calories in a serving. If you eat 1 slice, you will have eaten 90 calories. If you eat 2 slices, you will have eaten 180 calories.  How do I keep a food log?  After each time that you eat, record the following in your food log as soon as possible:  What you ate. Be sure to include toppings, sauces, and other extras on the food.  How much you ate. This can be measured in cups, ounces, or number of items.  How many calories were in each food and drink.  The total number of calories in the food you ate.  Keep your food log near you, such as in a pocket-sized notebook or on an carrillo or website on your mobile phone. Some programs will calculate calories for you and show you how many calories you have left to meet your daily goal.  What are some portion-control tips?  Know how many calories are in a serving. This will help you know how many servings you can have of a certain food.  Use a measuring cup to measure serving sizes. You could also try weighing out portions on a kitchen scale. With time, you will be able to estimate serving sizes for some foods.  Take time to put servings of different foods on your favorite plates or in your favorite bowls and cups so you know what a serving looks like.  Try not to eat straight from a food's packaging, such as from a bag or box. Eating straight from the package makes it hard to see how much you are eating and can lead to overeating. Put the amount you would like to eat in a cup or on a plate to make sure you are eating the right portion.  Use smaller plates, glasses, and bowls for smaller portions and to prevent  overeating.  Try not to multitask. For example, avoid watching TV or using your computer while eating. If it is time to eat, sit down at a table and enjoy your food. This will help you recognize when you are full. It will also help you be more mindful of what and how much you are eating.  What are tips for following this plan?  Reading food labels  Check the calorie count compared with the serving size. The serving size may be smaller than what you are used to eating.  Check the source of the calories. Try to choose foods that are high in protein, fiber, and vitamins, and low in saturated fat, trans fat, and sodium.  Shopping  Read nutrition labels while you shop. This will help you make healthy decisions about which foods to buy.  Pay attention to nutrition labels for low-fat or fat-free foods. These foods sometimes have the same number of calories or more calories than the full-fat versions. They also often have added sugar, starch, or salt to make up for flavor that was removed with the fat.  Make a grocery list of lower-calorie foods and stick to it.  Cooking  Try to cook your favorite foods in a healthier way. For example, try baking instead of frying.  Use low-fat dairy products.  Meal planning  Use more fruits and vegetables. One-half of your plate should be fruits and vegetables.  Include lean proteins, such as chicken, turkey, and fish.  Lifestyle  Each week, aim to do one of the followin minutes of moderate exercise, such as walking.  75 minutes of vigorous exercise, such as running.  General information  Know how many calories are in the foods you eat most often. This will help you calculate calorie counts faster.  Find a way of tracking calories that works for you. Get creative. Try different apps or programs if writing down calories does not work for you.  What foods should I eat?    Eat nutritious foods. It is better to have a nutritious, high-calorie food, such as an avocado, than a food with  few nutrients, such as a bag of potato chips.  Use your calories on foods and drinks that will fill you up and will not leave you hungry soon after eating.  Examples of foods that fill you up are nuts and nut butters, vegetables, lean proteins, and high-fiber foods such as whole grains. High-fiber foods are foods with more than 5 g of fiber per serving.  Pay attention to calories in drinks. Low-calorie drinks include water and unsweetened drinks.  The items listed above may not be a complete list of foods and beverages you can eat. Contact a dietitian for more information.  What foods should I limit?  Limit foods or drinks that are not good sources of vitamins, minerals, or protein or that are high in unhealthy fats. These include:  Candy.  Other sweets.  Sodas, specialty coffee drinks, alcohol, and juice.  The items listed above may not be a complete list of foods and beverages you should avoid. Contact a dietitian for more information.  How do I count calories when eating out?  Pay attention to portions. Often, portions are much larger when eating out. Try these tips to keep portions smaller:  Consider sharing a meal instead of getting your own.  If you get your own meal, eat only half of it. Before you start eating, ask for a container and put half of your meal into it.  When available, consider ordering smaller portions from the menu instead of full portions.  Pay attention to your food and drink choices. Knowing the way food is cooked and what is included with the meal can help you eat fewer calories.  If calories are listed on the menu, choose the lower-calorie options.  Choose dishes that include vegetables, fruits, whole grains, low-fat dairy products, and lean proteins.  Choose items that are boiled, broiled, grilled, or steamed. Avoid items that are buttered, battered, fried, or served with cream sauce. Items labeled as crispy are usually fried, unless stated otherwise.  Choose water, low-fat milk,  unsweetened iced tea, or other drinks without added sugar. If you want an alcoholic beverage, choose a lower-calorie option, such as a glass of wine or light beer.  Ask for dressings, sauces, and syrups on the side. These are usually high in calories, so you should limit the amount you eat.  If you want a salad, choose a garden salad and ask for grilled meats. Avoid extra toppings such as cruz, cheese, or fried items. Ask for the dressing on the side, or ask for olive oil and vinegar or lemon to use as dressing.  Estimate how many servings of a food you are given. Knowing serving sizes will help you be aware of how much food you are eating at restaurants.  Where to find more information  Centers for Disease Control and Prevention: www.cdc.gov  [place] Department of Agriculture: myplate.gov  Summary  Calorie counting means keeping track of how many calories you eat and drink each day. If you eat fewer calories than your body needs, you should lose weight.  A healthy amount of weight to lose per week is usually 1-2 lb (0.5-0.9 kg). This usually means reducing your daily calorie intake by 500-750 calories.  The number of calories in a food can be found on a Nutrition Facts label. If a food does not have a Nutrition Facts label, try to look up the calories online or ask your dietitian for help.  Use smaller plates, glasses, and bowls for smaller portions and to prevent overeating.  Use your calories on foods and drinks that will fill you up and not leave you hungry shortly after a meal.  This information is not intended to replace advice given to you by your health care provider. Make sure you discuss any questions you have with your health care provider.  Document Revised: 01/28/2021 Document Reviewed: 01/28/2021  Elsevier Patient Education © 2022 [employer]    Exercising to Lose Weight  Getting regular exercise is important for everyone. It is especially important if you are overweight. Being overweight increases  your risk of heart disease, stroke, diabetes, high blood pressure, and several types of cancer. Exercising, and reducing the calories you consume, can help you lose weight and improve fitness and health.  Exercise can be moderate or vigorous intensity. To lose weight, most people need to do a certain amount of moderate or vigorous-intensity exercise each week.  How can exercise affect me?  You lose weight when you exercise enough to burn more calories than you eat. Exercise also reduces body fat and builds muscle. The more muscle you have, the more calories you burn. Exercise also:  Improves mood.  Reduces stress and tension.  Improves your overall fitness, flexibility, and endurance.  Increases bone strength.  Moderate-intensity exercise  Moderate-intensity exercise is any activity that gets you moving enough to burn at least three times more energy (calories) than if you were sitting.  Examples of moderate exercise include:  Walking a mile in 15 minutes.  Doing light yard work.  Biking at an easy pace.  Most people should get at least 150 minutes of moderate-intensity exercise a week to maintain their body weight.  Vigorous-intensity exercise  Vigorous-intensity exercise is any activity that gets you moving enough to burn at least six times more calories than if you were sitting. When you exercise at this intensity, you should be working hard enough that you are not able to carry on a conversation.  Examples of vigorous exercise include:  Running.  Playing a team sport, such as football, basketball, and soccer.  Jumping rope.  Most people should get at least 75 minutes a week of vigorous exercise to maintain their body weight.  What actions can I take to lose weight?  The amount of exercise you need to lose weight depends on:  Your age.  The type of exercise.  Any health conditions you have.  Your overall physical ability.  Talk to your health care provider about how much exercise you need and what types of  activities are safe for you.  Nutrition    Make changes to your diet as told by your health care provider or diet and nutrition specialist (dietitian). This may include:  Eating fewer calories.  Eating more protein.  Eating less unhealthy fats.  Eating a diet that includes fresh fruits and vegetables, whole grains, low-fat dairy products, and lean protein.  Avoiding foods with added fat, salt, and sugar.  Drink plenty of water while you exercise to prevent dehydration or heat stroke.  Activity  Choose an activity that you enjoy and set realistic goals. Your health care provider can help you make an exercise plan that works for you.  Exercise at a moderate or vigorous intensity most days of the week.  The intensity of exercise may vary from person to person. You can tell how intense a workout is for you by paying attention to your breathing and heartbeat. Most people will notice their breathing and heartbeat get faster with more intense exercise.  Do resistance training twice each week, such as:  Push-ups.  Sit-ups.  Lifting weights.  Using resistance bands.  Getting short amounts of exercise can be just as helpful as long, structured periods of exercise. If you have trouble finding time to exercise, try doing these things as part of your daily routine:  Get up, stretch, and walk around every 30 minutes throughout the day.  Go for a walk during your lunch break.  Park your car farther away from your destination.  If you take public transportation, get off one stop early and walk the rest of the way.  Make phone calls while standing up and walking around.  Take the stairs instead of elevators or escalators.  Wear comfortable clothes and shoes with good support.  Do not exercise so much that you hurt yourself, feel dizzy, or get very short of breath.  Where to find more information  [place] Department of Health and Human Services: www.hhs.gov  Centers for Disease Control and Prevention: www.cdc.gov  Contact a health care  provider:  Before starting a new exercise program.  If you have questions or concerns about your weight.  If you have a medical problem that keeps you from exercising.  Get help right away if:  You have any of the following while exercising:  Injury.  Dizziness.  Difficulty breathing or shortness of breath that does not go away when you stop exercising.  Chest pain.  Rapid heartbeat.  These symptoms may represent a serious problem that is an emergency. Do not wait to see if the symptoms will go away. Get medical help right away. Call your local emergency services (911 in the [place] Do not drive yourself to the hospital.  Summary  Getting regular exercise is especially important if you are overweight.  Being overweight increases your risk of heart disease, stroke, diabetes, high blood pressure, and several types of cancer.  Losing weight happens when you burn more calories than you eat.  Reducing the amount of calories you eat, and getting regular moderate or vigorous exercise each week, helps you lose weight.  This information is not intended to replace advice given to you by your health care provider. Make sure you discuss any questions you have with your health care provider.  Document Revised: 02/13/2022 Document Reviewed: 02/13/2022  Elsevier Patient Education © 2022 [employer]

## 2023-07-25 NOTE — PROGRESS NOTES
Subjective   Garrick Guo is a 37 y.o. male. Presents today for   Chief Complaint   Patient presents with    Establish Care       History of Present Illness  Patient 36y/o male with seizure disorder, no seizures for some time;  on keppra;  reports fasting in am and dizzyness off/on of late, but has been working on ewight loss;  Taking testosterone on own with polycythemia, he is aware of risks and is donating blood;  +smoking;  has chronic smokers cough;  Not ready to quit smoking yet;      Review of Systems   Respiratory:  Positive for cough and shortness of breath.    Neurological:  Negative for seizures.     Patient Active Problem List   Diagnosis    Epilepsy    Vertigo    Substance abuse    Hyperlipidemia    Tinea pedis of both feet    Other headache syndrome    Erythrocytosis    Subarachnoid hemorrhage    Pneumothorax    Numbness of upper extremity    Cigarette smoker    High risk medication use       Social History     Socioeconomic History    Marital status: Single   Tobacco Use    Smoking status: Every Day     Packs/day: 2.00     Years: 20.00     Pack years: 40.00     Types: Cigarettes     Start date: 2002    Smokeless tobacco: Former     Quit date: 2018   Vaping Use    Vaping Use: Never used   Substance and Sexual Activity    Alcohol use: No    Drug use: Yes     Types: Marijuana    Sexual activity: Not Currently       Allergies   Allergen Reactions    Amoxicillin Other (See Comments)     Paralysis    Bactrim [Sulfamethoxazole-Trimethoprim] Seizure    Doxycycline Other (See Comments)     Seizure/memory loss    *Pt states he has tolerated and that the seizure was caused by a steroid pack    Other Other (See Comments)     PT STATES STEROID PACK CAUSED SEIZURE/MEMORY LOSS, UNSURE OF NAME       Current Outpatient Medications on File Prior to Visit   Medication Sig Dispense Refill    Accu-Chek Softclix Lancets lancets PT TO CHECK BS  each 3    albuterol sulfate  (90 Base) MCG/ACT inhaler  "Inhale 2 puffs Every 4 (Four) Hours As Needed for Wheezing or Shortness of Air. 8 g 1    Blood Glucose Monitoring Suppl (Accu-Chek Sara Plus) w/Device kit PT TO CHECK BS BID DX HYPOGLYCEMIA  DX R73.03 1 kit 0    glucose blood test strip TO CHECK BS  each 3    montelukast (Singulair) 10 MG tablet Take 1 tablet by mouth Every Night. 90 tablet 0    promethazine-dextromethorphan (PROMETHAZINE-DM) 6.25-15 MG/5ML syrup Take 5 mL by mouth 4 (Four) Times a Day As Needed for Cough. 118 mL 0    Testosterone Enanthate 200 MG/ML solution Inject  into the appropriate muscle as directed by prescriber.      [DISCONTINUED] levETIRAcetam (KEPPRA) 1000 MG tablet Take 1 tablet by mouth Every 12 (Twelve) Hours. 60 tablet 1    [DISCONTINUED] azithromycin (Zithromax Z-Daniel) 250 MG tablet Take 2 tablets by mouth on day 1, then 1 tablet daily on days 2-5 (Patient not taking: Reported on 7/25/2023) 6 tablet 0     No current facility-administered medications on file prior to visit.       Objective   Vitals:    07/25/23 1130   BP: 126/70   Pulse: 70   SpO2: 98%   Weight: 78.9 kg (174 lb)   Height: 170.2 cm (67\")     Body mass index is 27.25 kg/m².    Physical Exam  Vitals and nursing note reviewed.   Constitutional:       Appearance: He is well-developed.   Musculoskeletal:      Cervical back: Neck supple.   Skin:     General: Skin is warm and dry.   Neurological:      Mental Status: He is alert.   Psychiatric:         Behavior: Behavior normal.     Results  Full Pulmonary Function Test With Bronchodilator (Order [id number])  Order-Level Documents:    Scan on 12/2/2022 by Mora Culp APRN: PULMONARY FUNCTION TEST, [facility], 12/02/2022         Author: -- Service: -- Author Type: --   Filed: Date of Service: Creation Time:   Status: (Other)   Flow-volume loops reviewed and demonstrate good patient effort and the loops are normal in appearance.  The FEV1, FVC and FEV1 to FVC ratio are within normal limits and there was no significant " response noted to single-dose bronchodilator challenge.  Lung volume test including total lung capacity residual volume are within normal limits.     Impression: Pre and postbronchodilator spirometry and lung volume testing within normal limits.        Assessment & Plan   Diagnoses and all orders for this visit:    1. Polycythemia (Primary)  -     Cancel: CBC (No Diff)  -     Cancel: Comprehensive Metabolic Panel  -     CBC (No Diff)  -     Comprehensive Metabolic Panel    2. Seizure  -     Cancel: Comprehensive Metabolic Panel  -     Comprehensive Metabolic Panel    3. Nonintractable epilepsy due to external causes, without status epilepticus  -     levETIRAcetam (KEPPRA) 1000 MG tablet; Take 1 tablet by mouth Every 12 (Twelve) Hours.  Dispense: 180 tablet; Refill: 3    4. Dyslipidemia  -     Lipid Panel    No seizures, continue keppra  Donate blood regularly  Highly recommend quit smoking, not ready yet but contemplating.  Due check lipids         BMI is >= 25 and <30. (Overweight) The following options were offered after discussion;: weight loss educational material (shared in after visit summary)  losing weight with changes     -Follow up: 6 months and prn

## 2023-07-26 LAB
ALBUMIN SERPL-MCNC: 4.4 G/DL (ref 4.1–5.1)
ALBUMIN/GLOB SERPL: 2.1 {RATIO} (ref 1.2–2.2)
ALP SERPL-CCNC: 62 IU/L (ref 44–121)
ALT SERPL-CCNC: 45 IU/L (ref 0–44)
AST SERPL-CCNC: 42 IU/L (ref 0–40)
BILIRUB SERPL-MCNC: 0.3 MG/DL (ref 0–1.2)
BUN SERPL-MCNC: 20 MG/DL (ref 6–20)
BUN/CREAT SERPL: 24 (ref 9–20)
CALCIUM SERPL-MCNC: 9.7 MG/DL (ref 8.7–10.2)
CHLORIDE SERPL-SCNC: 102 MMOL/L (ref 96–106)
CHOLEST SERPL-MCNC: 197 MG/DL (ref 100–199)
CO2 SERPL-SCNC: 23 MMOL/L (ref 20–29)
CREAT SERPL-MCNC: 0.85 MG/DL (ref 0.76–1.27)
EGFRCR SERPLBLD CKD-EPI 2021: 114 ML/MIN/1.73
ERYTHROCYTE [DISTWIDTH] IN BLOOD BY AUTOMATED COUNT: 13.9 % (ref 11.6–15.4)
GLOBULIN SER CALC-MCNC: 2.1 G/DL (ref 1.5–4.5)
GLUCOSE SERPL-MCNC: 78 MG/DL (ref 70–99)
HCT VFR BLD AUTO: 53.1 % (ref 37.5–51)
HDLC SERPL-MCNC: 20 MG/DL
HGB BLD-MCNC: 18.3 G/DL (ref 13–17.7)
LDLC SERPL CALC-MCNC: 157 MG/DL (ref 0–99)
MCH RBC QN AUTO: 32 PG (ref 26.6–33)
MCHC RBC AUTO-ENTMCNC: 34.5 G/DL (ref 31.5–35.7)
MCV RBC AUTO: 93 FL (ref 79–97)
PLATELET # BLD AUTO: 243 X10E3/UL (ref 150–450)
POTASSIUM SERPL-SCNC: 5.2 MMOL/L (ref 3.5–5.2)
PROT SERPL-MCNC: 6.5 G/DL (ref 6–8.5)
RBC # BLD AUTO: 5.72 X10E6/UL (ref 4.14–5.8)
SODIUM SERPL-SCNC: 137 MMOL/L (ref 134–144)
TRIGL SERPL-MCNC: 110 MG/DL (ref 0–149)
VLDLC SERPL CALC-MCNC: 20 MG/DL (ref 5–40)
WBC # BLD AUTO: 7.2 X10E3/UL (ref 3.4–10.8)

## 2023-10-09 ENCOUNTER — OFFICE VISIT (OUTPATIENT)
Dept: FAMILY MEDICINE CLINIC | Facility: CLINIC | Age: 38
End: 2023-10-09
Payer: COMMERCIAL

## 2023-10-09 VITALS
WEIGHT: 181 LBS | DIASTOLIC BLOOD PRESSURE: 84 MMHG | OXYGEN SATURATION: 99 % | BODY MASS INDEX: 28.41 KG/M2 | SYSTOLIC BLOOD PRESSURE: 128 MMHG | HEIGHT: 67 IN | HEART RATE: 97 BPM

## 2023-10-09 DIAGNOSIS — R05.1 ACUTE COUGH: ICD-10-CM

## 2023-10-09 DIAGNOSIS — L08.9 SKIN INFECTION: Primary | ICD-10-CM

## 2023-10-09 PROCEDURE — 99213 OFFICE O/P EST LOW 20 MIN: CPT | Performed by: NURSE PRACTITIONER

## 2023-10-09 RX ORDER — DEXTROMETHORPHAN HYDROBROMIDE AND PROMETHAZINE HYDROCHLORIDE 15; 6.25 MG/5ML; MG/5ML
5 SYRUP ORAL 4 TIMES DAILY PRN
Qty: 180 ML | Refills: 0 | Status: SHIPPED | OUTPATIENT
Start: 2023-10-09 | End: 2023-10-17

## 2023-10-09 RX ORDER — CEPHALEXIN 500 MG/1
500 CAPSULE ORAL 4 TIMES DAILY
Qty: 20 CAPSULE | Refills: 0 | Status: SHIPPED | OUTPATIENT
Start: 2023-10-09 | End: 2023-10-14

## 2023-10-09 RX ORDER — CLINDAMYCIN HYDROCHLORIDE 300 MG/1
300 CAPSULE ORAL 3 TIMES DAILY
Qty: 30 CAPSULE | Refills: 0 | Status: SHIPPED | OUTPATIENT
Start: 2023-10-09

## 2023-10-09 RX ORDER — CLINDAMYCIN HYDROCHLORIDE 150 MG/1
150 CAPSULE ORAL 3 TIMES DAILY
Qty: 30 CAPSULE | Refills: 0 | Status: SHIPPED | OUTPATIENT
Start: 2023-10-09

## 2023-10-09 NOTE — PROGRESS NOTES
Subjective   Garrick Guo is a 37 y.o. male. Presents today for annual examination, possible staph infection.  Chief Complaint   Patient presents with   • Annual Exam   • possible staph infection       History Of Present Illness        Patient Active Problem List   Diagnosis   • Epilepsy   • Vertigo   • Substance abuse   • Hyperlipidemia   • Tinea pedis of both feet   • Other headache syndrome   • Erythrocytosis   • Subarachnoid hemorrhage   • Pneumothorax   • Numbness of upper extremity   • Cigarette smoker   • High risk medication use       Social History     Socioeconomic History   • Marital status: Single   Tobacco Use   • Smoking status: Every Day     Packs/day: 2.00     Years: 20.00     Additional pack years: 0.00     Total pack years: 40.00     Types: Cigarettes     Start date: 2002   • Smokeless tobacco: Former     Quit date: 2018   Vaping Use   • Vaping Use: Never used   Substance and Sexual Activity   • Alcohol use: No   • Drug use: Yes     Types: Marijuana   • Sexual activity: Not Currently       Allergies   Allergen Reactions   • Amoxicillin Other (See Comments)     Paralysis   • Bactrim [Sulfamethoxazole-Trimethoprim] Seizure   • Doxycycline Other (See Comments)     Seizure/memory loss    *Pt states he has tolerated and that the seizure was caused by a steroid pack   • Other Other (See Comments)     PT STATES STEROID PACK CAUSED SEIZURE/MEMORY LOSS, UNSURE OF NAME       Current Outpatient Medications on File Prior to Visit   Medication Sig Dispense Refill   • Accu-Chek Softclix Lancets lancets PT TO CHECK BS  each 3   • albuterol sulfate  (90 Base) MCG/ACT inhaler Inhale 2 puffs Every 4 (Four) Hours As Needed for Wheezing or Shortness of Air. 8 g 1   • Blood Glucose Monitoring Suppl (Accu-Chek Sara Plus) w/Device kit PT TO CHECK BS BID DX HYPOGLYCEMIA  DX R73.03 1 kit 0   • glucose blood test strip TO CHECK BS  each 3   • levETIRAcetam (KEPPRA) 1000 MG tablet Take 1 tablet  "by mouth Every 12 (Twelve) Hours. 180 tablet 3   • montelukast (Singulair) 10 MG tablet Take 1 tablet by mouth Every Night. 90 tablet 0   • Testosterone Enanthate 200 MG/ML solution Inject  into the appropriate muscle as directed by prescriber.       No current facility-administered medications on file prior to visit.       Objective   Vitals:    10/09/23 0931   BP: 128/84   Pulse: 97   SpO2: 99%   Weight: 82.1 kg (181 lb)   Height: 170.2 cm (67\")     Body mass index is 28.35 kg/m².    Physical Exam  Constitutional:       Appearance: He is normal weight.   HENT:      Head: Normocephalic and atraumatic.      Right Ear: Ear canal and external ear normal.      Ears:      Comments: Right otitis media withpurlence.  Eyes:      Pupils: Pupils are equal, round, and reactive to light.   Cardiovascular:      Rate and Rhythm: Normal rate and regular rhythm.      Pulses: Normal pulses.      Heart sounds: Normal heart sounds.   Pulmonary:      Effort: Pulmonary effort is normal.      Breath sounds: Normal breath sounds.   Abdominal:      General: Bowel sounds are normal.   Musculoskeletal:         General: Normal range of motion.   Skin:     General: Skin is warm.      Capillary Refill: Capillary refill takes less than 2 seconds.      Findings: Erythema present. No lesion or rash.      Comments: Skin is warm to touch and erythemic on legs - appears to be exczema that is infected.  Will treat as infection.   Neurological:      General: No focal deficit present.      Mental Status: He is alert.   Psychiatric:         Mood and Affect: Mood normal.         Procedures     Assessment & Plan   Diagnoses and all orders for this visit:    1. Skin infection (Primary)  -     clindamycin (Cleocin) 300 MG capsule; Take 1 capsule by mouth 3 (Three) Times a Day.  Dispense: 30 capsule; Refill: 0  -     clindamycin (Cleocin) 150 MG capsule; Take 1 capsule by mouth 3 (Three) Times a Day.  Dispense: 30 capsule; Refill: 0    2. Acute cough  -     " promethazine-dextromethorphan (PROMETHAZINE-DM) 6.25-15 MG/5ML syrup; Take 5 mL by mouth 4 (Four) Times a Day As Needed for Cough.  Dispense: 180 mL; Refill: 0    Other orders  -     cephalexin (Keflex) 500 MG capsule; Take 1 capsule by mouth 4 (Four) Times a Day for 5 days.  Dispense: 20 capsule; Refill: 0            Discussed Care Gaps, ordered referrals and encouraged vaccination updates.       - Pt agrees with plan of care and denies further questions/concerns today  - This document is intended for medical expert use only. Persons  reading this document without medical staff guidance may result in misinterpretation and unintended morbidity     Go to the ER for any possible life-threatening symptoms such as chest pain or shortness of air.      Please allow 3-5 business days for recommendations based on new results      I personally spent time with this patient, preparing for the visit, reviewing tests, obtaining and/or reviewing a separately obtained history, performing a medically appropriate examination and/or evaluation, counseling and educating the patient/family/caregiver, ordering medications,  documenting information in the medical record and indepentently interpreting results.               No follow-ups on file.

## 2023-10-17 ENCOUNTER — OFFICE VISIT (OUTPATIENT)
Dept: FAMILY MEDICINE CLINIC | Facility: CLINIC | Age: 38
End: 2023-10-17
Payer: COMMERCIAL

## 2023-10-17 ENCOUNTER — TELEPHONE (OUTPATIENT)
Dept: FAMILY MEDICINE CLINIC | Facility: CLINIC | Age: 38
End: 2023-10-17
Payer: COMMERCIAL

## 2023-10-17 VITALS
BODY MASS INDEX: 27.94 KG/M2 | SYSTOLIC BLOOD PRESSURE: 128 MMHG | HEART RATE: 80 BPM | WEIGHT: 178 LBS | OXYGEN SATURATION: 99 % | HEIGHT: 67 IN | RESPIRATION RATE: 20 BRPM | DIASTOLIC BLOOD PRESSURE: 74 MMHG

## 2023-10-17 DIAGNOSIS — F17.210 CIGARETTE SMOKER: ICD-10-CM

## 2023-10-17 DIAGNOSIS — R06.02 SHORTNESS OF BREATH: ICD-10-CM

## 2023-10-17 DIAGNOSIS — R06.02 SHORTNESS OF BREATH: Primary | ICD-10-CM

## 2023-10-17 RX ORDER — PREDNISONE 10 MG/1
TABLET ORAL
Qty: 32 TABLET | Refills: 0 | Status: SHIPPED | OUTPATIENT
Start: 2023-10-17 | End: 2023-10-17 | Stop reason: SDUPTHER

## 2023-10-17 RX ORDER — FLUTICASONE PROPIONATE AND SALMETEROL 100; 50 UG/1; UG/1
1 POWDER RESPIRATORY (INHALATION)
Qty: 60 EACH | Refills: 1 | Status: SHIPPED | OUTPATIENT
Start: 2023-10-17 | End: 2023-10-17 | Stop reason: SDUPTHER

## 2023-10-17 RX ORDER — PREDNISONE 10 MG/1
TABLET ORAL
Qty: 32 TABLET | Refills: 0 | Status: SHIPPED | OUTPATIENT
Start: 2023-10-17

## 2023-10-17 RX ORDER — ALBUTEROL SULFATE 90 UG/1
2 AEROSOL, METERED RESPIRATORY (INHALATION) EVERY 4 HOURS PRN
Qty: 18 G | Refills: 3 | Status: SHIPPED | OUTPATIENT
Start: 2023-10-17

## 2023-10-17 RX ORDER — AZITHROMYCIN 250 MG/1
TABLET, FILM COATED ORAL
Qty: 6 TABLET | Refills: 0 | Status: SHIPPED | OUTPATIENT
Start: 2023-10-17

## 2023-10-17 RX ORDER — FLUTICASONE PROPIONATE AND SALMETEROL 100; 50 UG/1; UG/1
1 POWDER RESPIRATORY (INHALATION)
Qty: 60 EACH | Refills: 1 | Status: SHIPPED | OUTPATIENT
Start: 2023-10-17

## 2023-10-17 RX ORDER — AZITHROMYCIN 250 MG/1
TABLET, FILM COATED ORAL DAILY
COMMUNITY
End: 2023-10-17 | Stop reason: SDUPTHER

## 2023-10-17 NOTE — TELEPHONE ENCOUNTER
Pt states that he though a antibiotic would be sent into along with the inhaler and steroid. The pharmacy does not have a script for the antibiotic.     He also states that [facility] pharmacy states there is no more refills on his Albuterol.       Please advise.

## 2023-10-17 NOTE — PROGRESS NOTES
"Chief Complaint  Chest Pain, Shortness of Breath, Pain (On inhale/exhale), Headache, and Pressure Behind the Eyes    States he had an incident on Saturday believes he had a seizure; ambulance arrived but did not say he had one    Subjective        Garrick Guo presents to [facility]  History of Present Illness  Garrick reports that he took his testosterone, had his coffee and smoked more cigarettes than usual.  A couple of hours later, he had a sudden onset of headache with tunnel vision - he felt like his head was going to explode.  He called 911.  He denies LOC, he remembers everything that was going on.  While he was on the phone with EMS his symptoms started to jarrod and he told them to cancel.  EMS arrived anyway, performed an EKG, and he refused transport.  He opted to come here instead.  He admits he is going to decrease his steroid dose.  Garrick now believes he has a problem with his back or his lungs because he has pain when he breathes deep and coughs.    Objective   Vital Signs:  /74   Pulse 80   Resp 20   Ht 170.2 cm (67.01\")   Wt 80.7 kg (178 lb)   SpO2 99%   BMI 27.87 kg/m²   Estimated body mass index is 27.87 kg/m² as calculated from the following:    Height as of this encounter: 170.2 cm (67.01\").    Weight as of this encounter: 80.7 kg (178 lb).       Physical Exam  Constitutional:       Appearance: Normal appearance.   HENT:      Head: Normocephalic and atraumatic.      Right Ear: Tympanic membrane normal.      Left Ear: Tympanic membrane normal.      Mouth/Throat:      Mouth: Mucous membranes are moist.   Eyes:      Pupils: Pupils are equal, round, and reactive to light.   Cardiovascular:      Rate and Rhythm: Normal rate and regular rhythm.      Pulses: Normal pulses.      Heart sounds: Normal heart sounds.   Pulmonary:      Effort: Pulmonary effort is normal.      Comments: Wheezing noted  Abdominal:      General: Bowel sounds are normal. "   Musculoskeletal:         General: Normal range of motion.   Skin:     General: Skin is warm and dry.      Capillary Refill: Capillary refill takes less than 2 seconds.   Neurological:      General: No focal deficit present.      Mental Status: He is alert.   Psychiatric:         Mood and Affect: Mood normal.        Discussed weight and BMI.  Encouraged healthy diet and regular exercise.  Patient works out frequently.  He is solid, not flabby.      Chest x-ray shows fluffiness throughout lung fields.  Will treat for Bronchitis due to smoking history.           Assessment and Plan   Diagnoses and all orders for this visit:    1. Shortness of breath (Primary)  -     XR Chest 2 View; Future  -     predniSONE (DELTASONE) 10 MG tablet; 6 tabs po qd x 2 days, 4 tabs po qd x 2 days, 3 tabs po qd x 2 days, 2 tabs po qd x 2 days, 1 tab po qd x 2 days  Dispense: 32 tablet; Refill: 0  -     Fluticasone-Salmeterol (ADVAIR/WIXELA) 100-50 MCG/ACT DISKUS; Inhale 1 puff 2 (Two) Times a Day.  Dispense: 60 each; Refill: 1  -     XR Chest 2 View      Discussed Care Gaps, ordered referrals and encouraged vaccination updates.       - Pt agrees with plan of care and denies further questions/concerns today  - This document is intended for medical expert use only. Persons  reading this document without medical staff guidance may result in misinterpretation and unintended morbidity     Go to the ER for any possible life-threatening symptoms such as chest pain or shortness of air.      Please allow 3-5 business days for recommendations based on new results      I personally spent time with this patient, preparing for the visit, reviewing tests, obtaining and/or reviewing a separately obtained history, performing a medically appropriate examination and/or evaluation, counseling and educating the patient/family/caregiver, ordering medications,  documenting information in the medical record and indepentently interpreting results.             Follow Up   Return if symptoms worsen or fail to improve.  Patient was given instructions and counseling regarding his condition or for health maintenance advice. Please see specific information pulled into the AVS if appropriate.

## 2023-10-19 ENCOUNTER — TELEPHONE (OUTPATIENT)
Dept: FAMILY MEDICINE CLINIC | Facility: CLINIC | Age: 38
End: 2023-10-19
Payer: COMMERCIAL

## 2023-10-19 NOTE — TELEPHONE ENCOUNTER
Patient was seen in office on 10/17 for chest pain, shortness of breath (inhale/exhale), headache and pressure behind eyes. He states Chacha did an xray and Lisa sent in some some med's to his pharmacy.     At 12 am this morning patient woke up with aggressive hiccups, heartburn and was vomiting stomach vile.   Pt states it stopped around 7 am but now he is having trouble breathing and pain in his right lung. He wanted an apt to come in to get another Xray because he is worried something has collapsed.     After speaking with Chacha I advised the patient to go to the ER due to his symptoms and to get specific imaging done. Pt declined and states he will wait this out because he does not want to go.

## 2023-12-11 ENCOUNTER — OFFICE VISIT (OUTPATIENT)
Dept: FAMILY MEDICINE CLINIC | Facility: CLINIC | Age: 38
End: 2023-12-11
Payer: COMMERCIAL

## 2023-12-11 VITALS
SYSTOLIC BLOOD PRESSURE: 124 MMHG | BODY MASS INDEX: 28.91 KG/M2 | HEART RATE: 65 BPM | OXYGEN SATURATION: 98 % | TEMPERATURE: 98.8 F | WEIGHT: 184.2 LBS | DIASTOLIC BLOOD PRESSURE: 72 MMHG | HEIGHT: 67 IN

## 2023-12-11 DIAGNOSIS — R74.8 ELEVATED LIVER ENZYMES: ICD-10-CM

## 2023-12-11 DIAGNOSIS — L02.419 ABSCESS OF DELTOID REGION: Primary | ICD-10-CM

## 2023-12-11 DIAGNOSIS — L08.9 SKIN INFECTION: ICD-10-CM

## 2023-12-11 DIAGNOSIS — E78.5 DYSLIPIDEMIA: ICD-10-CM

## 2023-12-11 RX ORDER — DOXYCYCLINE HYCLATE 100 MG/1
100 CAPSULE ORAL 2 TIMES DAILY
Qty: 20 CAPSULE | Refills: 0 | Status: SHIPPED | OUTPATIENT
Start: 2023-12-11 | End: 2023-12-21

## 2023-12-11 NOTE — PROGRESS NOTES
Subjective   Garrick Guo is a 37 y.o. male.     Chief Complaint   Patient presents with    Mass       Left upper arm Deltoid area    Susceptible to MRSA         History of Present Illness     Deltoid abscess (left): Hx MRSA. Treated 10/09/23 for prior skin infection with clindamycin x10 days as well as keflex. He presents today for abscess on left deltoid. He injects testosterone in this area frequently. Denies fever/chills/redness/warmth/pain.     The following portions of the patient's history were reviewed and updated as appropriate: allergies, current medications, past family history, past medical history, past social history, past surgical history and problem list.    Review of Systems   Denies dizziness, fatigue, fever/chills, CP, SOA, headache, blood in urine/stool, abd pain, leg swelling.    Objective     Vitals:    12/11/23 0920   BP: 124/72   Pulse: 65   Temp: 98.8 °F (37.1 °C)   SpO2: 98%      Body mass index is 28.84 kg/m².    Physical Exam  Constitutional:       General: He is not in acute distress.     Appearance: Normal appearance. He is not ill-appearing or toxic-appearing.   HENT:      Right Ear: External ear normal.      Left Ear: External ear normal.      Nose: No congestion or rhinorrhea.      Mouth/Throat:      Mouth: Mucous membranes are moist.      Pharynx: Oropharynx is clear.   Eyes:      Conjunctiva/sclera: Conjunctivae normal.   Cardiovascular:      Rate and Rhythm: Normal rate.      Pulses: Normal pulses.      Heart sounds: Normal heart sounds.   Pulmonary:      Effort: Pulmonary effort is normal. No respiratory distress.      Breath sounds: Normal breath sounds.   Abdominal:      General: Bowel sounds are normal.      Palpations: Abdomen is soft.      Tenderness: There is no abdominal tenderness.   Skin:     General: Skin is warm and dry.      Capillary Refill: Capillary refill takes less than 2 seconds.      Coloration: Skin is not ashen, cyanotic, jaundiced, mottled, pale or  faraz.      Findings: Abscess present. No abrasion, acne, bruising, burn, ecchymosis, erythema, signs of injury, laceration, lesion, petechiae, rash or wound.             Comments: 4x4cm abscess. Closed. Absent of warmth, erythema, drainage.    Neurological:      General: No focal deficit present.      Mental Status: He is alert and oriented to person, place, and time.      Motor: No weakness.   Psychiatric:         Behavior: Behavior normal.       Assessment & Plan   Diagnoses and all orders for this visit:    1. Abscess of deltoid region (Primary)  -     doxycycline (VIBRAMYCIN) 100 MG capsule; Take 1 capsule by mouth 2 (Two) Times a Day for 10 days.  Dispense: 20 capsule; Refill: 0    2. Skin infection  -     CBC No Differential    3. Elevated liver enzymes  -     Comprehensive metabolic panel    4. Dyslipidemia  -     Lipid panel      Plan:     Complete labs today in office, or as soon as possible  Take 10-day ATB. Complete full course. Notify provider if by day 8 not improved. Will consider draining at that point.   Follow up if symptoms worsen or fail to improve  Call office or go to ER for symptoms of chest pain, shortness of air, increased fever, or any other worsening symptom.    Discussed Care Gaps, ordered referrals and encouraged vaccination updates.       - Pt agrees with plan of care and denies further questions/concerns today  - This document is intended for medical expert use only. Persons  reading this document without medical staff guidance may result in misinterpretation and unintended morbidity     Go to the ER for any possible life-threatening symptoms such as chest pain or shortness of air.      Please allow 3-5 business days for recommendations based on new results      I personally spent time with this patient, preparing for the visit, reviewing tests, obtaining and/or reviewing a separately obtained history, performing a medically appropriate examination and/or evaluation, counseling and  educating the patient/family/caregiver, ordering medications,  documenting information in the medical record and indepentently interpreting results.

## 2023-12-11 NOTE — PATIENT INSTRUCTIONS
Steps to Quit Smoking    Smoking tobacco is the leading cause of preventable death. It can affect almost every organ in the body. Smoking puts you and people around you at risk for many serious, long-lasting (chronic) diseases. Quitting smoking can be hard, but it is one of the best things that you can do for your health. It is never too late to quit.  Do not give up if you cannot quit the first time. Some people need to try many times to quit. Do your best to stick to your quit plan, and talk with your doctor if you have any questions or concerns.  How do I get ready to quit?  Pick a date to quit. Set a date within the next 2 weeks to give you time to prepare.  Write down the reasons why you are quitting. Keep this list in places where you will see it often.  Tell your family, friends, and co-workers that you are quitting. Their support is important.  Talk with your doctor about the choices that may help you quit.  Find out if your health insurance will pay for these treatments.  Know the people, places, things, and activities that make you want to smoke (triggers). Avoid them.  What first steps can I take to quit smoking?  Throw away all cigarettes at home, at work, and in your car.  Throw away the things that you use when you smoke, such as ashtrays and lighters.  Clean your car. Empty the ashtray.  Clean your home, including curtains and carpets.  What can I do to help me quit smoking?  Talk with your doctor about taking medicines and seeing a counselor. You are more likely to succeed when you do both.  If you are pregnant or breastfeeding:  Talk with your doctor about counseling or other ways to quit smoking.  Do not take medicine to help you quit smoking unless your doctor tells you to.  Quit right away  Quit smoking completely, instead of slowly cutting back on how much you smoke over a period of time. Stopping smoking right away may be more successful than slowly quitting.  Go to counseling. In-person is  best if this is an option. You are more likely to quit if you go to counseling sessions regularly.  Take medicine  You may take medicines to help you quit. Some medicines need a prescription, and some you can buy over-the-counter. Some medicines may contain a drug called nicotine to replace the nicotine in cigarettes. Medicines may:  Help you stop having the desire to smoke (cravings).  Help to stop the problems that come when you stop smoking (withdrawal symptoms).  Your doctor may ask you to use:  Nicotine patches, gum, or lozenges.  Nicotine inhalers or sprays.  Non-nicotine medicine that you take by mouth.  Find resources  Find resources and other ways to help you quit smoking and remain smoke-free after you quit. They include:  Online chats with a counselor.  Phone quitlines.  Printed self-help materials.  Support groups or group counseling.  Text messaging programs.  Mobile phone apps. Use apps on your mobile phone or tablet that can help you stick to your quit plan. Examples of free services include Quit Guide from the CDC and smokefree.gov    What can I do to make it easier to quit?    Talk to your family and friends. Ask them to support and encourage you.  Call a phone quitline, such as 800-QUIT-NOW, reach out to support groups, or work with a counselor.  Ask people who smoke to not smoke around you.  Avoid places that make you want to smoke, such as:  Bars.  Parties.  Smoke-break areas at work.  Spend time with people who do not smoke.  Lower the stress in your life. Stress can make you want to smoke. Try these things to lower stress:  Getting regular exercise.  Doing deep-breathing exercises.  Doing yoga.  Meditating.  What benefits will I see if I quit smoking?  Over time, you may have:  A better sense of smell and taste.  Less coughing and sore throat.  A slower heart rate.  Lower blood pressure.  Clearer skin.  Better breathing.  Fewer sick days.  Summary  Quitting smoking can be hard, but it is one  of the best things that you can do for your health.  Do not give up if you cannot quit the first time. Some people need to try many times to quit.  When you decide to quit smoking, make a plan to help you succeed.  Quit smoking right away, not slowly over a period of time.  When you start quitting, get help and support to keep you smoke-free.  This information is not intended to replace advice given to you by your health care provider. Make sure you discuss any questions you have with your health care provider.  Document Revised: 12/09/2022 Document Reviewed: 12/09/2022  Elsevier Patient Education © 2023 [employer]

## 2023-12-13 LAB
ALBUMIN SERPL-MCNC: 4.3 G/DL (ref 3.5–5.2)
ALBUMIN/GLOB SERPL: 2.2 G/DL
ALP SERPL-CCNC: 51 U/L (ref 39–117)
ALT SERPL-CCNC: 29 U/L (ref 1–41)
AST SERPL-CCNC: 26 U/L (ref 1–40)
BILIRUB SERPL-MCNC: 0.5 MG/DL (ref 0–1.2)
BUN SERPL-MCNC: 10 MG/DL (ref 6–20)
BUN/CREAT SERPL: 11.5 (ref 7–25)
CALCIUM SERPL-MCNC: 9.4 MG/DL (ref 8.6–10.5)
CHLORIDE SERPL-SCNC: 103 MMOL/L (ref 98–107)
CHOLEST SERPL-MCNC: 146 MG/DL (ref 0–200)
CO2 SERPL-SCNC: 30.2 MMOL/L (ref 22–29)
CREAT SERPL-MCNC: 0.87 MG/DL (ref 0.76–1.27)
EGFRCR SERPLBLD CKD-EPI 2021: 113.3 ML/MIN/1.73
ERYTHROCYTE [DISTWIDTH] IN BLOOD BY AUTOMATED COUNT: 12.9 % (ref 12.3–15.4)
GLOBULIN SER CALC-MCNC: 2 GM/DL
GLUCOSE SERPL-MCNC: 96 MG/DL (ref 65–99)
HCT VFR BLD AUTO: 51.9 % (ref 37.5–51)
HDLC SERPL-MCNC: 38 MG/DL (ref 40–60)
HGB BLD-MCNC: 18.1 G/DL (ref 13–17.7)
LDLC SERPL CALC-MCNC: 96 MG/DL (ref 0–100)
MCH RBC QN AUTO: 32.1 PG (ref 26.6–33)
MCHC RBC AUTO-ENTMCNC: 34.9 G/DL (ref 31.5–35.7)
MCV RBC AUTO: 92 FL (ref 79–97)
PLATELET # BLD AUTO: 243 10*3/MM3 (ref 140–450)
POTASSIUM SERPL-SCNC: 4.5 MMOL/L (ref 3.5–5.2)
PROT SERPL-MCNC: 6.3 G/DL (ref 6–8.5)
RBC # BLD AUTO: 5.64 10*6/MM3 (ref 4.14–5.8)
SODIUM SERPL-SCNC: 142 MMOL/L (ref 136–145)
TRIGL SERPL-MCNC: 56 MG/DL (ref 0–150)
VLDLC SERPL CALC-MCNC: 12 MG/DL (ref 5–40)
WBC # BLD AUTO: 10.05 10*3/MM3 (ref 3.4–10.8)

## 2023-12-18 ENCOUNTER — NURSE TRIAGE (OUTPATIENT)
Dept: CALL CENTER | Facility: HOSPITAL | Age: 38
End: 2023-12-18
Payer: COMMERCIAL

## 2023-12-18 ENCOUNTER — OFFICE VISIT (OUTPATIENT)
Dept: FAMILY MEDICINE CLINIC | Facility: CLINIC | Age: 38
End: 2023-12-18
Payer: COMMERCIAL

## 2023-12-18 VITALS
OXYGEN SATURATION: 97 % | HEIGHT: 67 IN | SYSTOLIC BLOOD PRESSURE: 116 MMHG | DIASTOLIC BLOOD PRESSURE: 68 MMHG | HEART RATE: 69 BPM | WEIGHT: 178 LBS | BODY MASS INDEX: 27.94 KG/M2

## 2023-12-18 DIAGNOSIS — T14.90XA TRAUMA: Primary | ICD-10-CM

## 2023-12-18 NOTE — TELEPHONE ENCOUNTER
Reason for Disposition   Large swelling or bruise > 2 inches (5 cm)    Additional Information   Negative: [1] Major bleeding (e.g., actively dripping or spurting) AND [2] can't be stopped   Negative: Bullet wound, knife wound, or other penetrating object   Negative: Difficulty breathing or choking   Negative: Knocked out (unconscious) > 1 minute   Negative: Difficult to awaken or acting confused (e.g., disoriented, slurred speech)   Negative: Severe neck pain   Negative: Sounds like a life-threatening emergency to the triager   Negative: Head or forehead injury is main concern   Negative: Neck injury is main concern   Negative: Injury mainly to forehead or head   Negative: Injury mainly to eye or orbit   Negative: Injury mainly to the ear   Negative: Injury mainly to the mouth   Negative: Injury mainly to the nose   Negative: Injury mainly to the teeth   Negative: Wound looks infected   Negative: Looks like a broken bone (e.g., cheekbone is flat on one side)   Negative: Can't fully open or close the mouth   Negative: Crooked face or smile   Negative: [1] Bleeding AND [2] won't stop after 10 minutes of direct pressure (using correct technique)   Negative: Skin is split open or gaping (or length > 1/4 inch or 6 mm)   Negative: Neck pain   Negative: Injury caused by high speed (e.g., MVA), great height (e.g., fall > 10 feet or 3 meters), or severe blow from hard object (e.g., golf club or baseball bat)   Negative: Sounds like a serious injury to the triager   Negative: [1] Knocked out (unconscious) < 1 minute AND [2] now fine   Negative: Closing the mouth and biting down does not feel normal   Negative: Double vision or unable to look upward   Negative: Numbness of the face (i.e., claims loss of sensation in part of face)   Negative: Taking Coumadin (warfarin) or other strong blood thinner, or known bleeding disorder (e.g., thrombocytopenia)   Negative: [1] SEVERE pain AND [2] not improved 2 hours after pain  "medicine/ice packs   Negative: Suspicious history for the injury   Negative: Patient is confused or is an unreliable provider of information (e.g., dementia, severe intellectual disability, alcohol intoxication)   Negative: [1] No prior tetanus shots (or is not fully vaccinated) AND [2] any wound (e.g., cut, scrape)   Negative: [1] HIV positive or severe immunodeficiency (severely weak immune system) AND [2] DIRTY cut or scrape    Answer Assessment - Initial Assessment Questions  1. MECHANISM: \"How did the injury happen?\"       Was lifting weights and bar broke.  2. ONSET: \"When did the injury happen?\" (Minutes or hours ago)      Prior to call  3. LOCATION: \"What part of the face is injured?\"      jaw  4. APPEARANCE of INJURY: \"What does the face look like?\"      Starting to swell  5. BLEEDING: \"Is it bleeding now?\" If Yes, ask: \"Is it difficult to stop?\"      no  6. PAIN: \"Is there pain?\" If Yes, ask: \"How bad is the pain?\"  (e.g., Scale 1-10; or mild, moderate, severe)      mild  7. SIZE: For cuts, bruises, or swelling, ask: \"How large is it?\" (e.g., inches or centimeters)       Mild so far  8. TETANUS: For any breaks in the skin, ask: \"When was the last tetanus booster?\"      na  9. OTHER SYMPTOMS: \"Do you have any other symptoms?\" (e.g., neck pain, headache, loss of consciousness)      no  10. PREGNANCY: \"Is there any chance you are pregnant?\" \"When was your last menstrual period?\"        na    Protocols used: Face Injury-ADULT-AH    "

## 2023-12-18 NOTE — PROGRESS NOTES
Subjective   Garrick Guo is a 37 y.o. male. Presents today for injury to right mandible, right clavicle, and right knee  Chief Complaint   Patient presents with    Jaw Pain     Weight bench Broke  Weight hit rt side of face and neck/shoulder area    Lower Extremity Issue     Inner Rt thigh pain and Swelling from fall with weight bench breaking       History Of Present Illness  Garrick was bench pressing 450 lbs when he opted to box jump onto a weight bench; the weight bend broke and he fell onto his right side with the bench striking him in the right mandible and clavicular area.  He twisted the right knee while falling.      Patient Active Problem List   Diagnosis    Epilepsy    Vertigo    Substance abuse    Hyperlipidemia    Tinea pedis of both feet    Other headache syndrome    Erythrocytosis    Subarachnoid hemorrhage    Pneumothorax    Numbness of upper extremity    Cigarette smoker    High risk medication use       Social History     Socioeconomic History    Marital status: Single   Tobacco Use    Smoking status: Every Day     Packs/day: 2.00     Years: 20.00     Additional pack years: 0.00     Total pack years: 40.00     Types: Cigarettes     Start date: 2002    Smokeless tobacco: Former     Quit date: 2018   Vaping Use    Vaping Use: Never used   Substance and Sexual Activity    Alcohol use: No    Drug use: Yes     Types: Marijuana    Sexual activity: Not Currently       Allergies   Allergen Reactions    Amoxicillin Other (See Comments)     Paralysis    Bactrim [Sulfamethoxazole-Trimethoprim] Seizure    Doxycycline Other (See Comments)     Seizure/memory loss    *Pt states he has tolerated and that the seizure was caused by a steroid pack    Other Other (See Comments)     PT STATES STEROID PACK CAUSED SEIZURE/MEMORY LOSS, UNSURE OF NAME       Current Outpatient Medications on File Prior to Visit   Medication Sig Dispense Refill    Accu-Chek Softclix Lancets lancets PT TO CHECK BS  each 3     "albuterol sulfate  (90 Base) MCG/ACT inhaler Inhale 2 puffs Every 4 (Four) Hours As Needed for Wheezing or Shortness of Air. 18 g 3    Blood Glucose Monitoring Suppl (Accu-Chek Sara Plus) w/Device kit PT TO CHECK BS BID DX HYPOGLYCEMIA  DX R73.03 1 kit 0    doxycycline (VIBRAMYCIN) 100 MG capsule Take 1 capsule by mouth 2 (Two) Times a Day for 10 days. 20 capsule 0    Fluticasone-Salmeterol (ADVAIR/WIXELA) 100-50 MCG/ACT DISKUS Inhale 1 puff 2 (Two) Times a Day. 60 each 1    glucose blood test strip TO CHECK BS  each 3    levETIRAcetam (KEPPRA) 1000 MG tablet Take 1 tablet by mouth Every 12 (Twelve) Hours. 180 tablet 3    montelukast (Singulair) 10 MG tablet Take 1 tablet by mouth Every Night. 90 tablet 0    Testosterone Enanthate 200 MG/ML solution Inject  into the appropriate muscle as directed by prescriber.       No current facility-administered medications on file prior to visit.       Objective   Vitals:    12/18/23 1533   BP: 116/68   BP Location: Left arm   Patient Position: Sitting   Cuff Size: Adult   Pulse: 69   SpO2: 97%   Weight: 80.7 kg (178 lb)   Height: 170.2 cm (67.01\")     Body mass index is 27.87 kg/m².    Physical Exam  Constitutional:       Appearance: He is normal weight.   HENT:      Head: Normocephalic.      Comments: Right mandible edematous, right clavicular area edematous, right knee, edematous below the knee cap medially.     Mouth/Throat:      Mouth: Mucous membranes are moist.   Eyes:      Pupils: Pupils are equal, round, and reactive to light.   Cardiovascular:      Rate and Rhythm: Normal rate and regular rhythm.      Pulses: Normal pulses.      Heart sounds: Normal heart sounds.   Pulmonary:      Effort: Pulmonary effort is normal.      Breath sounds: Normal breath sounds.   Abdominal:      General: Bowel sounds are normal.   Musculoskeletal:         General: Normal range of motion.   Skin:     General: Skin is warm.      Capillary Refill: Capillary refill takes less " than 2 seconds.   Neurological:      General: No focal deficit present.      Mental Status: He is alert.   Psychiatric:         Mood and Affect: Mood normal.       Procedures     Assessment & Plan   Diagnoses and all orders for this visit:    1. Trauma (Primary)  -     XR Facial Bones < 3 View (In Office)  -     XR Knee 1 or 2 View Right; Future  -     XR Clavicle Right  -     XR Knee 1 or 2 View Right       Sent patient to [facility] to have images performed.       Discussed Care Gaps, ordered referrals and encouraged vaccination updates.       - Pt agrees with plan of care and denies further questions/concerns today  - This document is intended for medical expert use only. Persons  reading this document without medical staff guidance may result in misinterpretation and unintended morbidity     Go to the ER for any possible life-threatening symptoms such as chest pain or shortness of air.      Please allow 3-5 business days for recommendations based on new results      I personally spent time with this patient, preparing for the visit, reviewing tests, obtaining and/or reviewing a separately obtained history, performing a medically appropriate examination and/or evaluation, counseling and educating the patient/family/caregiver, ordering medications,  documenting information in the medical record and indepentently interpreting results.        Return in about 6 weeks (around 1/30/2024) for Next scheduled follow up with Dr. Benjamin.

## 2023-12-18 NOTE — TELEPHONE ENCOUNTER
Call transferred from [facility]UB, unable to reach the office.  Caller was lifting weights, bar broke and fell hitting jaw.  Was lifting 455#.  Able to open and close mouth and speak normally.  Has appt in an hour.  Recommended if swelling increases to where he has difficulty opening mouth go to the ED.

## 2023-12-21 ENCOUNTER — TELEPHONE (OUTPATIENT)
Dept: FAMILY MEDICINE CLINIC | Facility: CLINIC | Age: 38
End: 2023-12-21

## 2023-12-21 NOTE — TELEPHONE ENCOUNTER
Caller: Garrick Guo    Relationship to patient: Self    Best call back number: [phone]      Patient is needing: PATIENT REQUESTS A CALLBACK TO DISCUSS HIS MOST RECENT IMAGING TEST RESULTS.    PLEASE ADVISE.         Spoke with patient's mother advised that the cradle cap was going away but coming right back, was told at last visit that they could start a RX topical if it did not go away.  Please advise patient uses [facility] on [address]

## 2023-12-22 ENCOUNTER — TELEPHONE (OUTPATIENT)
Dept: FAMILY MEDICINE CLINIC | Facility: CLINIC | Age: 38
End: 2023-12-22
Payer: COMMERCIAL

## 2023-12-22 NOTE — TELEPHONE ENCOUNTER
[facility] could not do imaging that day so he went to [facility]. Patient called back requesting results. I was able to print results off of care everywhere and he will be picking the copies up today. May need a nurse when he comes in if he has any questions.

## 2023-12-22 NOTE — TELEPHONE ENCOUNTER
I called [facility]. They said that pt has not had any Imaging there since 2022. I called pt and let message to call me back to see where he had Imaging done.  Rula

## 2024-01-30 ENCOUNTER — OFFICE VISIT (OUTPATIENT)
Dept: FAMILY MEDICINE CLINIC | Facility: CLINIC | Age: 39
End: 2024-01-30
Payer: COMMERCIAL

## 2024-01-30 VITALS
HEART RATE: 71 BPM | SYSTOLIC BLOOD PRESSURE: 128 MMHG | BODY MASS INDEX: 29.19 KG/M2 | HEIGHT: 67 IN | DIASTOLIC BLOOD PRESSURE: 82 MMHG | OXYGEN SATURATION: 98 % | WEIGHT: 186 LBS

## 2024-01-30 DIAGNOSIS — J32.1 CHRONIC FRONTAL SINUSITIS: Primary | ICD-10-CM

## 2024-01-30 DIAGNOSIS — G40.509 NONINTRACTABLE EPILEPSY DUE TO EXTERNAL CAUSES, WITHOUT STATUS EPILEPTICUS: ICD-10-CM

## 2024-01-30 PROCEDURE — 99213 OFFICE O/P EST LOW 20 MIN: CPT | Performed by: FAMILY MEDICINE

## 2024-01-30 RX ORDER — LEVETIRACETAM 1000 MG/1
1000 TABLET ORAL EVERY 12 HOURS
Qty: 180 TABLET | Refills: 3 | Status: SHIPPED | OUTPATIENT
Start: 2024-01-30

## 2024-01-30 RX ORDER — AZELASTINE HYDROCHLORIDE, FLUTICASONE PROPIONATE 137; 50 UG/1; UG/1
1 SPRAY, METERED NASAL 2 TIMES DAILY
Qty: 23 G | Refills: 12 | Status: SHIPPED | OUTPATIENT
Start: 2024-01-30

## 2024-01-30 RX ORDER — METHYLPREDNISOLONE 4 MG/1
TABLET ORAL
Qty: 21 TABLET | Refills: 0 | Status: SHIPPED | OUTPATIENT
Start: 2024-01-30

## 2024-01-30 RX ORDER — AZITHROMYCIN 250 MG/1
TABLET, FILM COATED ORAL
Qty: 6 TABLET | Refills: 0 | Status: SHIPPED | OUTPATIENT
Start: 2024-01-30

## 2024-01-30 NOTE — PROGRESS NOTES
Subjective   Garrick Guo is a 38 y.o. male. Presents today for   Chief Complaint   Patient presents with    Seizures    Sinusitis       History of Present Illness  Patient 39 y/o male with recurrent sinusitis;   Has again, reports always has some chronically;   claritin no relief;  singulair occly with minimal relief;  Flonase using daily.      Using testosterone on own off label, aware of risks;   Having bump/swelling at injection site;  Will change mfg.    Hx of seizures, none for quite sometime;   on keppra  chronically.     Review of Systems   HENT:  Positive for congestion, sinus pressure and sinus pain.    Respiratory:  Negative for shortness of breath.    Cardiovascular:  Negative for chest pain and palpitations.   Gastrointestinal:  Negative for abdominal pain.       Patient Active Problem List   Diagnosis    Epilepsy    Vertigo    Substance abuse    Hyperlipidemia    Tinea pedis of both feet    Other headache syndrome    Erythrocytosis    Subarachnoid hemorrhage    Pneumothorax    Numbness of upper extremity    Cigarette smoker    High risk medication use       Social History     Socioeconomic History    Marital status: Single   Tobacco Use    Smoking status: Every Day     Packs/day: 2.00     Years: 20.00     Additional pack years: 0.00     Total pack years: 40.00     Types: Cigarettes     Start date: 2002    Smokeless tobacco: Former     Quit date: 2018   Vaping Use    Vaping Use: Never used   Substance and Sexual Activity    Alcohol use: No    Drug use: Yes     Types: Marijuana    Sexual activity: Not Currently       Allergies   Allergen Reactions    Amoxicillin Other (See Comments)     Paralysis    Bactrim [Sulfamethoxazole-Trimethoprim] Seizure    Doxycycline Other (See Comments)     Seizure/memory loss    *Pt states he has tolerated and that the seizure was caused by a steroid pack    Other Other (See Comments)     PT STATES STEROID PACK CAUSED SEIZURE/MEMORY LOSS, UNSURE OF NAME  "      Current Outpatient Medications on File Prior to Visit   Medication Sig Dispense Refill    Accu-Chek Softclix Lancets lancets PT TO CHECK BS  each 3    albuterol sulfate  (90 Base) MCG/ACT inhaler Inhale 2 puffs Every 4 (Four) Hours As Needed for Wheezing or Shortness of Air. 18 g 3    Blood Glucose Monitoring Suppl (Accu-Chek Sara Plus) w/Device kit PT TO CHECK BS BID DX HYPOGLYCEMIA  DX R73.03 1 kit 0    Fluticasone-Salmeterol (ADVAIR/WIXELA) 100-50 MCG/ACT DISKUS Inhale 1 puff 2 (Two) Times a Day. 60 each 1    glucose blood test strip TO CHECK BS  each 3    levETIRAcetam (KEPPRA) 1000 MG tablet Take 1 tablet by mouth Every 12 (Twelve) Hours. 180 tablet 3    montelukast (Singulair) 10 MG tablet Take 1 tablet by mouth Every Night. 90 tablet 0    Testosterone Enanthate 200 MG/ML solution Inject  into the appropriate muscle as directed by prescriber.       No current facility-administered medications on file prior to visit.       Objective   Vitals:    01/30/24 1005   BP: 128/82   Pulse: 71   SpO2: 98%   Weight: 84.4 kg (186 lb)   Height: 170.2 cm (67\")     Body mass index is 29.13 kg/m².    Physical Exam  Vitals and nursing note reviewed.   Constitutional:       Appearance: He is well-developed.   HENT:      Head: Normocephalic and atraumatic.      Right Ear: There is impacted cerumen.      Left Ear: There is impacted cerumen.      Nose: Congestion present.   Neck:      Thyroid: No thyromegaly.      Vascular: No JVD.   Cardiovascular:      Rate and Rhythm: Normal rate and regular rhythm.      Heart sounds: Normal heart sounds. No murmur heard.     No friction rub. No gallop.   Pulmonary:      Effort: Pulmonary effort is normal. No respiratory distress.      Breath sounds: Normal breath sounds. No wheezing or rales.   Abdominal:      General: Bowel sounds are normal. There is no distension.      Palpations: Abdomen is soft.      Tenderness: There is no abdominal tenderness. There is no " guarding or rebound.   Musculoskeletal:      Cervical back: Neck supple.   Skin:     General: Skin is warm and dry.   Neurological:      Mental Status: He is alert.   Psychiatric:         Behavior: Behavior normal.         Assessment & Plan   Diagnoses and all orders for this visit:    1. Chronic frontal sinusitis (Primary)  -     Azelastine-Fluticasone 137-50 MCG/ACT suspension; 1 spray into the nostril(s) as directed by provider 2 (Two) Times a Day.  Dispense: 23 g; Refill: 12  -     azithromycin (ZITHROMAX) 250 MG tablet; Take first 2 tablets together, then 1 every day until finished.  Dispense: 6 tablet; Refill: 0  -     methylPREDNISolone (MEDROL) 4 MG dose pack; Take as directed on package instructions.  Dispense: 21 tablet; Refill: 0  -     Ambulatory Referral to ENT (Otolaryngology)    Refer to ENTfor chronic sinus issues  No seizures - continue keppra                   -Follow up: 6 months and prn

## 2024-03-28 ENCOUNTER — OFFICE VISIT (OUTPATIENT)
Dept: FAMILY MEDICINE CLINIC | Facility: CLINIC | Age: 39
End: 2024-03-28
Payer: COMMERCIAL

## 2024-03-28 VITALS
DIASTOLIC BLOOD PRESSURE: 76 MMHG | HEIGHT: 67 IN | WEIGHT: 187.8 LBS | HEART RATE: 74 BPM | TEMPERATURE: 98.6 F | OXYGEN SATURATION: 98 % | BODY MASS INDEX: 29.47 KG/M2 | SYSTOLIC BLOOD PRESSURE: 116 MMHG

## 2024-03-28 DIAGNOSIS — H66.91 ACUTE OTITIS MEDIA, RIGHT: Primary | ICD-10-CM

## 2024-03-28 DIAGNOSIS — R06.2 WHEEZING: ICD-10-CM

## 2024-03-28 PROCEDURE — 99213 OFFICE O/P EST LOW 20 MIN: CPT

## 2024-03-28 RX ORDER — DOXYCYCLINE HYCLATE 100 MG/1
100 CAPSULE ORAL 2 TIMES DAILY
Qty: 14 CAPSULE | Refills: 0 | Status: SHIPPED | OUTPATIENT
Start: 2024-03-28 | End: 2024-04-04

## 2024-03-28 RX ORDER — ALBUTEROL SULFATE 90 UG/1
2 AEROSOL, METERED RESPIRATORY (INHALATION) EVERY 4 HOURS PRN
Qty: 18 G | Refills: 3 | Status: SHIPPED | OUTPATIENT
Start: 2024-03-28

## 2024-03-28 NOTE — PROGRESS NOTES
SUBJECTIVE:   Louise presents today for an annual exam. She would also like to discuss issues that she is having with her Mirena IUD. The patient has never been seen before in our department. She currently is a 20 year old  0 with a Mirena IUD in place. This was inserted in May 2015. Prior to having her Mirena IUD placed, the patient was on the NuvaRing and was doing very well on hormonal contraception. She then developed a series of complex migraines with visual aura and was taken off her NuvaRing. Since having the Mirena IUD in place, the patient has had problems with abdominal cramping discomfort. This has gotten progressively worse, and the patient would like to have the IUD removed, if possible.    Of note, the patient has had a recent pelvic ultrasound that shows the IUD to be in good/proper position.    She currently denies any problems with vaginal discharge, odor or dysuria. She gets very minimal menstrual flow. She does note some entrance dyspareunia, but denies any other pain with intercourse. She denies any stress or urge incontinence. She would like to have cervical cultures for gonorrhea/Chlamydia but does not wish to have any other STD testing.    I spoke at length with the patient regarding other IUD options, including the Kyleena and Sofia, progesterone containing IUDs that are smaller than the Mirena. We also reviewed the ParaGard IUD, but if the size of the device is an issue, the ParaGard would potentially be as painful as the Mirena. I answered multiple questions that the patient had regarding the smaller IUDs. We also discussed other hormonal contraceptives that would be acceptable in the setting of a complex migraine headaches, including Gesterol only birth control pills and Depo-Provera.    PAST MEDICAL HISTORY:   Complex migraines. Since stopping the NuvaRing, the patient is had very few of these. Her visual symptoms go away with Relpax.  Asthma.    PAST SURGICAL HISTORY:  Subjective   Garrick Guo is a 38 y.o. male.     Chief Complaint   Patient presents with    Earache     Right ear drainage x 6 days  Pain radiating into his neck     History of Present Illness  He has been experiencing nasal drainage, sore throat for approx 6 days now. Yesterday right ear pain began, which radiates along the right side of his neck. He has had slight fever on and off. Pain kept him up last night. He's tried Mucinex, tylenol/ibuprofen, flonase, and zyrtec. Rates 5/10 pain, limited just to right side. Also headache, pressure above eyes.   Earache   There is pain in the right ear. He has tried acetaminophen and NSAIDs for the symptoms. The treatment provided mild relief.      The following portions of the patient's history were reviewed and updated as appropriate: allergies, current medications, past family history, past medical history, past social history, past surgical history and problem list.    Review of Systems   Denies dizziness, fatigue, CP, SOA, blood in urine/stool, abd pain, leg swelling.    Objective     Vitals:    03/28/24 0845   BP: 116/76   Pulse: 74   Temp: 98.6 °F (37 °C)   SpO2: 98%      Body mass index is 29.41 kg/m².    Physical Exam  Vitals reviewed.   Constitutional:       General: He is not in acute distress.     Appearance: Normal appearance. He is not ill-appearing or toxic-appearing.   HENT:      Right Ear: External ear normal. Tenderness present. A middle ear effusion is present. There is mastoid tenderness.      Left Ear: Hearing, tympanic membrane, ear canal and external ear normal.      Nose: Congestion and rhinorrhea present.      Mouth/Throat:      Mouth: Mucous membranes are moist.      Pharynx: Oropharynx is clear. Posterior oropharyngeal erythema present.   Eyes:      Conjunctiva/sclera: Conjunctivae normal.   Cardiovascular:      Rate and Rhythm: Normal rate and regular rhythm.      Pulses: Normal pulses.      Heart sounds: Normal heart sounds.   Pulmonary:       Effort: Pulmonary effort is normal. No respiratory distress.      Breath sounds: Normal breath sounds.   Abdominal:      General: Bowel sounds are normal.      Palpations: Abdomen is soft.      Tenderness: There is no abdominal tenderness.   Musculoskeletal:      Cervical back: Tenderness present.   Skin:     General: Skin is warm and dry.      Capillary Refill: Capillary refill takes less than 2 seconds.   Neurological:      General: No focal deficit present.      Mental Status: He is alert and oriented to person, place, and time.      Motor: No weakness.   Psychiatric:         Behavior: Behavior normal.       Assessment & Plan   Diagnoses and all orders for this visit:    1. Acute otitis media, right (Primary)  -     doxycycline (VIBRAMYCIN) 100 MG capsule; Take 1 capsule by mouth 2 (Two) Times a Day for 7 days.  Dispense: 14 capsule; Refill: 0    2. Wheezing  -     albuterol sulfate  (90 Base) MCG/ACT inhaler; Inhale 2 puffs Every 4 (Four) Hours As Needed for Wheezing or Shortness of Air.  Dispense: 18 g; Refill: 3      Plan:     Take ATB x7 days.   Take OTC medications as needed for symptom management. Rest, increase water intake.   Follow up if symptoms worsen or fail to improve    Discussed Care Gaps, ordered referrals and encouraged vaccination updates.       - Pt agrees with plan of care and denies further questions/concerns today  - This document is intended for medical expert use only. Persons  reading this document without medical staff guidance may result in misinterpretation and unintended morbidity     Go to the ER for any possible life-threatening symptoms such as chest pain or shortness of air.      Please allow 3-5 business days for recommendations based on new results      I personally spent time with this patient, preparing for the visit, reviewing tests, obtaining and/or reviewing a separately obtained history, performing a medically appropriate examination and/or evaluation,    Tonsillectomy and adenoidectomy.    PAST OBSTETRICS/GYNECOLOGY HISTORY:   Onset of menses at age 11. History of dysmenorrhea. This was eliminated with the use of the NuvaRing. It has been manageable with the IUD. The patient has never had a Pap smear. No history of any sexually transmitted diseases. Louise has had a full HPV vaccine.    SOCIAL HISTORY:   The patient does not smoke and consumes alcohol socially.    MEDICATIONS AND ALLERGIES:   Her medications and allergies are as listed in the computer.     The nursing notes and vital signs were appreciated.     FAMILY HISTORY:   Negative for breast, ovarian or colon cancer. There is no history of diabetes or heart disease.    Louise is due for a tetanus booster.    REVIEW OF SYSTEMS:   The patient currently denies any persistent bloating, indigestion or early satiety. Lower abdominal cramping, as noted.    PHYSICAL EXAM:   Visit Vitals  /86 (BP Location: [facility], Patient Position: Sitting, Cuff Size: Regular)   Pulse 64   Ht 5' 7\" (1.702 m)   Wt 93.4 kg   BMI 32.26 kg/m²   General:  Alert and oriented ×3. Pleasant and cooperative. In no apparent distress.  HEENT: Within normal limits. No lymphadenopathy or thyromegaly.   CHEST: Clear to auscultation. No costovertebral angle tenderness.   CARDIOVASCULAR: Regular rate and rhythm. No murmurs.  BREASTS: There is no evidence of any mass or discharge on either side. The breasts are symmetric and nontender.  ABDOMEN: Soft, nontender, no masses. Prominent striae are present on the patient's abdomen.  GENITOURINARY: Normal external genitalia. BUS normal. The urethra is normal and free of any lesions. To speculum exam, the cervix and vagina are clear. The IUD string is clearly visible at the cervical os. Cultures for gonorrhea and Chlamydia were obtained, per patient request. A Pap smear was obtained without difficulty. Bimanual exam reveals the uterus to be midplane and normal in size. There are no adnexal masses.  Rectal exam is normal with good sphincter tone.     ASSESSMENT:   A 20 year old  0 with history of complex migraines. Lower abdominal cramping since Mirena IUD placement. Abdominal striae, rule out insulin resistance.     PLAN:   1. Await results of Pap smear.  2. Check hemoglobin A1c.  3. The patient was given information regarding the Sofia and Kyleena IUDs. She will contact us if she would like to have her Mirena IUD pulled and a new IUD placed.  4. Tetanus/diphtheria/pertussis booster.  5. Return visit in one year, or as needed for problems.  6. Await results of gonorrhea and Chlamydia cultures.      The patient understood and had all of her questions addressed. She was given an after visit summary.          counseling and educating the patient/family/caregiver, ordering medications,  documenting information in the medical record and indepentently interpreting results.

## 2024-08-13 DIAGNOSIS — R73.01 IMPAIRED FASTING GLUCOSE: ICD-10-CM

## 2024-08-13 DIAGNOSIS — E29.1 HYPOGONADISM IN MALE: ICD-10-CM

## 2024-08-13 DIAGNOSIS — E78.5 DYSLIPIDEMIA: Primary | ICD-10-CM

## 2024-08-15 LAB
ALBUMIN SERPL-MCNC: 4.2 G/DL (ref 4.1–5.1)
ALBUMIN/CREAT UR: 3 MG/G CREAT (ref 0–29)
ALP SERPL-CCNC: 54 IU/L (ref 44–121)
ALT SERPL-CCNC: 27 IU/L (ref 0–44)
AST SERPL-CCNC: 33 IU/L (ref 0–40)
BILIRUB SERPL-MCNC: 0.6 MG/DL (ref 0–1.2)
BUN SERPL-MCNC: 19 MG/DL (ref 6–20)
BUN/CREAT SERPL: 21 (ref 9–20)
CALCIUM SERPL-MCNC: 9.2 MG/DL (ref 8.7–10.2)
CHLORIDE SERPL-SCNC: 102 MMOL/L (ref 96–106)
CHOLEST SERPL-MCNC: 170 MG/DL (ref 100–199)
CO2 SERPL-SCNC: 23 MMOL/L (ref 20–29)
CREAT SERPL-MCNC: 0.89 MG/DL (ref 0.76–1.27)
CREAT UR-MCNC: 129.4 MG/DL
EGFRCR SERPLBLD CKD-EPI 2021: 112 ML/MIN/1.73
ERYTHROCYTE [DISTWIDTH] IN BLOOD BY AUTOMATED COUNT: 13 % (ref 11.6–15.4)
GLOBULIN SER CALC-MCNC: 2.4 G/DL (ref 1.5–4.5)
GLUCOSE SERPL-MCNC: 72 MG/DL (ref 70–99)
HBA1C MFR BLD: 5.5 % (ref 4.8–5.6)
HCT VFR BLD AUTO: 58.6 % (ref 37.5–51)
HDLC SERPL-MCNC: 41 MG/DL
HGB BLD-MCNC: 19.6 G/DL (ref 13–17.7)
LDLC SERPL CALC-MCNC: 111 MG/DL (ref 0–99)
MCH RBC QN AUTO: 32.2 PG (ref 26.6–33)
MCHC RBC AUTO-ENTMCNC: 33.4 G/DL (ref 31.5–35.7)
MCV RBC AUTO: 96 FL (ref 79–97)
MICROALBUMIN UR-MCNC: 3.5 UG/ML
PLATELET # BLD AUTO: 245 X10E3/UL (ref 150–450)
POTASSIUM SERPL-SCNC: 4.5 MMOL/L (ref 3.5–5.2)
PROT SERPL-MCNC: 6.6 G/DL (ref 6–8.5)
RBC # BLD AUTO: 6.08 X10E6/UL (ref 4.14–5.8)
SODIUM SERPL-SCNC: 140 MMOL/L (ref 134–144)
TESTOST FREE SERPL-MCNC: >50 PG/ML (ref 8.7–25.1)
TESTOST SERPL-MCNC: >1500 NG/DL (ref 264–916)
TRIGL SERPL-MCNC: 97 MG/DL (ref 0–149)
VLDLC SERPL CALC-MCNC: 18 MG/DL (ref 5–40)
WBC # BLD AUTO: 9.3 X10E3/UL (ref 3.4–10.8)

## 2024-08-18 NOTE — PROGRESS NOTES
Call results to patient.  Testosterone is very high, he is using on own. He should cut back as running risk of causing a heart attack or stroke.  Blood counts are rising and blood essentially running too thick.   I would recommend he go to [facility] to give blood.  Bring me orders.  We need to start ASAP as reaching critically high range.  A1c normal  Kidney and liver function ok  Cholesterol mildly elevated, work on diet

## 2024-08-20 ENCOUNTER — OFFICE VISIT (OUTPATIENT)
Dept: FAMILY MEDICINE CLINIC | Facility: CLINIC | Age: 39
End: 2024-08-20
Payer: COMMERCIAL

## 2024-08-20 VITALS
DIASTOLIC BLOOD PRESSURE: 70 MMHG | SYSTOLIC BLOOD PRESSURE: 110 MMHG | WEIGHT: 187 LBS | HEART RATE: 76 BPM | OXYGEN SATURATION: 98 % | HEIGHT: 67 IN | BODY MASS INDEX: 29.35 KG/M2

## 2024-08-20 DIAGNOSIS — E78.5 DYSLIPIDEMIA: Primary | ICD-10-CM

## 2024-08-20 DIAGNOSIS — N40.0 BENIGN PROSTATIC HYPERPLASIA WITHOUT LOWER URINARY TRACT SYMPTOMS: ICD-10-CM

## 2024-08-20 DIAGNOSIS — E29.1 HYPOGONADISM IN MALE: ICD-10-CM

## 2024-08-20 PROCEDURE — 99214 OFFICE O/P EST MOD 30 MIN: CPT | Performed by: FAMILY MEDICINE

## 2024-08-20 NOTE — PROGRESS NOTES
Subjective   Garrick Guo is a 38 y.o. male. Presents today for   Chief Complaint   Patient presents with    Hyperlipidemia     Had preappt labs       History of Present Illness  Patient 37 yo male with mild hld, had preappt labs;  He is using testosterone on own.   I put on med list to make sure monitoring for ill effects.  He had testosterone checked for first time and is very high alogn with polycythemia;  He is donating blood.   He is working to get down to 9% body fat and lifting weights.  No cp/soa; no focal neuro deficits   Review of Systems   Respiratory:  Negative for shortness of breath.    Cardiovascular:  Negative for chest pain and palpitations.   Gastrointestinal:  Negative for abdominal pain.       Patient Active Problem List   Diagnosis    Epilepsy    Vertigo    Substance abuse    Hyperlipidemia    Tinea pedis of both feet    Other headache syndrome    Erythrocytosis    Subarachnoid hemorrhage    Pneumothorax    Numbness of upper extremity    Cigarette smoker    High risk medication use       Social History     Socioeconomic History    Marital status: Single   Tobacco Use    Smoking status: Every Day     Current packs/day: 2.00     Average packs/day: 2.0 packs/day for 22.6 years (45.3 ttl pk-yrs)     Types: Cigarettes     Start date: 2002    Smokeless tobacco: Former     Quit date: 2018   Vaping Use    Vaping status: Never Used   Substance and Sexual Activity    Alcohol use: No    Drug use: Yes     Types: Marijuana    Sexual activity: Not Currently       Allergies   Allergen Reactions    Amoxicillin Other (See Comments)     Paralysis    Bactrim [Sulfamethoxazole-Trimethoprim] Seizure    Doxycycline Other (See Comments)     Seizure/memory loss    *Pt states he has tolerated and that the seizure was caused by a steroid pack    Other Other (See Comments)     PT STATES STEROID PACK CAUSED SEIZURE/MEMORY LOSS, UNSURE OF NAME       Current Outpatient Medications on File Prior to Visit   Medication  "Sig Dispense Refill    Accu-Chek Softclix Lancets lancets PT TO CHECK BS  each 3    albuterol sulfate  (90 Base) MCG/ACT inhaler Inhale 2 puffs Every 4 (Four) Hours As Needed for Wheezing or Shortness of Air. 18 g 3    Blood Glucose Monitoring Suppl (Accu-Chek Sara Plus) w/Device kit PT TO CHECK BS BID DX HYPOGLYCEMIA  DX R73.03 1 kit 0    glucose blood test strip TO CHECK BS  each 3    levETIRAcetam (KEPPRA) 1000 MG tablet Take 1 tablet by mouth Every 12 (Twelve) Hours. 180 tablet 3    montelukast (Singulair) 10 MG tablet Take 1 tablet by mouth Every Night. 90 tablet 0    Testosterone Enanthate 200 MG/ML solution Inject  into the appropriate muscle as directed by prescriber.       No current facility-administered medications on file prior to visit.       Objective   Vitals:    08/20/24 0821   BP: 110/70   Pulse: 76   SpO2: 98%   Weight: 84.8 kg (187 lb)   Height: 170.2 cm (67\")     Body mass index is 29.29 kg/m².    Physical Exam  Vitals and nursing note reviewed.   Constitutional:       Appearance: He is well-developed.   HENT:      Head: Normocephalic and atraumatic.   Neck:      Thyroid: No thyromegaly.      Vascular: No JVD.   Cardiovascular:      Rate and Rhythm: Normal rate and regular rhythm.      Heart sounds: Normal heart sounds. No murmur heard.     No friction rub. No gallop.   Pulmonary:      Effort: Pulmonary effort is normal. No respiratory distress.      Breath sounds: Normal breath sounds. No wheezing or rales.   Abdominal:      General: Bowel sounds are normal. There is no distension.      Palpations: Abdomen is soft.      Tenderness: There is no abdominal tenderness. There is no guarding or rebound.   Musculoskeletal:      Cervical back: Neck supple.   Skin:     General: Skin is warm and dry.   Neurological:      Mental Status: He is alert.      Gait: Gait normal.   Psychiatric:         Behavior: Behavior normal.       Component      Latest Ref Rng 8/13/2024   Glucose      70 " - 99 mg/dL 72    BUN      6 - 20 mg/dL 19    Creatinine      0.76 - 1.27 mg/dL 0.89    EGFR Result      >59 mL/min/1.73 112    BUN/Creatinine Ratio      9 - 20  21 (H)    Sodium      134 - 144 mmol/L 140    Potassium      3.5 - 5.2 mmol/L 4.5    Chloride      96 - 106 mmol/L 102    CO2      20 - 29 mmol/L 23    Calcium      8.7 - 10.2 mg/dL 9.2    Total Protein      6.0 - 8.5 g/dL 6.6    Albumin      4.1 - 5.1 g/dL 4.2    Globulin      1.5 - 4.5 g/dL 2.4    Total Bilirubin      0.0 - 1.2 mg/dL 0.6    Alkaline Phosphatase      44 - 121 IU/L 54    AST (SGOT)      0 - 40 IU/L 33    ALT (SGPT)      0 - 44 IU/L 27    WBC      3.4 - 10.8 x10E3/uL 9.3    RBC      4.14 - 5.80 x10E6/uL 6.08 (H)    Hemoglobin      13.0 - 17.7 g/dL 19.6 (H)    Hematocrit      37.5 - 51.0 % 58.6 (H)    MCV      79 - 97 fL 96    MCH      26.6 - 33.0 pg 32.2    MCHC      31.5 - 35.7 g/dL 33.4    RDW      11.6 - 15.4 % 13.0    Platelets      150 - 450 x10E3/uL 245    Total Cholesterol      100 - 199 mg/dL 170    Triglycerides      0 - 149 mg/dL 97    HDL Cholesterol      >39 mg/dL 41    VLDL Cholesterol Checo      5 - 40 mg/dL 18    LDL Cholesterol       0 - 99 mg/dL 111 (H)    Creatinine, Urine      Not Estab. mg/dL 129.4    Microalbumin, Urine      Not Estab. ug/mL 3.5    Microalbumin/Creatinine Ratio      0 - 29 mg/g creat 3    Testosterone, Total      264 - 916 ng/dL >1500 (H)    Testosterone, Free      8.7 - 25.1 pg/mL >50.0 (H)    Hemoglobin A1C      4.8 - 5.6 % 5.5       Legend:  (H) High    Assessment & Plan   Diagnoses and all orders for this visit:    1. Dyslipidemia (Primary)    2. Hypogonadism in male  -     CBC (No Diff)  -     Comprehensive Metabolic Panel  -     Testosterone, Free, Total  -     PSA DIAGNOSTIC    3. Benign prostatic hyperplasia without lower urinary tract symptoms  -     PSA DIAGNOSTIC      Asa 81mg 1 daily, patient asked about and ok to start, but would work to get RBC down;  counseled on high risk for stroke and  cardiovascular adverse outcome.  Start phlebotomy, had staff fax order to have done every 8 weeks  I don't recommend testosterone, he obtains on own;    Ok to monitor for him, would cut testosterone back as very high;  using supplement that supposed to improve;          BMI is >= 25 and <30. (Overweight) The following options were offered after discussion;: weight loss educational material (shared in after visit summary)     -Follow up: 6 months and prn

## 2024-08-22 ENCOUNTER — TELEPHONE (OUTPATIENT)
Dept: FAMILY MEDICINE CLINIC | Facility: CLINIC | Age: 39
End: 2024-08-22
Payer: COMMERCIAL

## 2024-08-23 NOTE — TELEPHONE ENCOUNTER
Pt has appt with [facility] today to have blood drawn since he hadn't heard anything from [facility]. I did give him the phone number to [facility] since he asked for it. He said he may call them.

## 2024-08-26 ENCOUNTER — OFFICE VISIT (OUTPATIENT)
Dept: FAMILY MEDICINE CLINIC | Facility: CLINIC | Age: 39
End: 2024-08-26
Payer: COMMERCIAL

## 2024-08-26 VITALS
HEIGHT: 67 IN | DIASTOLIC BLOOD PRESSURE: 74 MMHG | SYSTOLIC BLOOD PRESSURE: 126 MMHG | HEART RATE: 78 BPM | OXYGEN SATURATION: 95 % | WEIGHT: 185.2 LBS | BODY MASS INDEX: 29.07 KG/M2

## 2024-08-26 DIAGNOSIS — M25.819 SHOULDER IMPINGEMENT: Primary | ICD-10-CM

## 2024-08-26 DIAGNOSIS — S29.011A PECTORALIS MUSCLE STRAIN, INITIAL ENCOUNTER: ICD-10-CM

## 2024-08-26 DIAGNOSIS — M75.22 BICEPS TENDONITIS ON LEFT: ICD-10-CM

## 2024-08-26 PROBLEM — J93.9 PNEUMOTHORAX: Status: RESOLVED | Noted: 2021-11-30 | Resolved: 2024-08-26

## 2024-08-26 PROCEDURE — 99213 OFFICE O/P EST LOW 20 MIN: CPT

## 2024-08-26 RX ORDER — METHYLPREDNISOLONE 4 MG
TABLET, DOSE PACK ORAL
Qty: 21 TABLET | Refills: 0 | Status: SHIPPED | OUTPATIENT
Start: 2024-08-26

## 2024-08-26 NOTE — PATIENT INSTRUCTIONS

## 2024-08-26 NOTE — PROGRESS NOTES
Subjective   Garrick Guo is a 38 y.o. male.     Chief Complaint   Patient presents with    Shoulder Pain     With pain in milan, bicept, and glute     History of Present Illness     Shoulder impingement, biceps tendonitis, pectoralis strain: Patient presents with 2-week history of shoulder blade pain, left bicep pain, left pectoralis pain.  He is an avid workout enthusiast and around 2 weeks ago he was lifting up his workout partner above his head to help her do pull-ups.  His pain began shortly after this.  He describes the pain in his shoulder blade as pinching.  He also has left bicep pain that will radiate down to his forearm.  He has baseline numbness and tingling of his arm.  He is currently working out 6 days each week.  He states that after the initial injury he felt like his chest was going to separate.  He is also feeling pinching under his armpit.  He has been taking ibuprofen as needed with little improvement.  He is not wearing any type of brace.  He is not using any topicals or icing.  Rates his pain as 3/10.  Also notes that he noticed a lump on his left glued that started a few days ago.  It is sore to touch.  He denies any skin abnormalities, redness, draining.  His pain is associated with movement.    The following portions of the patient's history were reviewed and updated as appropriate: allergies, current medications, past family history, past medical history, past social history, past surgical history and problem list.    Review of Systems   Denies dizziness, fatigue, fever/chills, CP, SOA, headache, blood in urine/stool, abd pain, leg swelling.    Objective     Vitals:    08/26/24 1308   BP: 126/74   Pulse: 78   SpO2: 95%      Body mass index is 29.01 kg/m².    Physical Exam  Vitals reviewed.   Constitutional:       General: He is not in acute distress.     Appearance: Normal appearance. He is not ill-appearing or toxic-appearing.   HENT:      Right Ear: External ear normal.      Left  Ear: External ear normal.      Nose: No congestion or rhinorrhea.      Mouth/Throat:      Mouth: Mucous membranes are moist.      Pharynx: Oropharynx is clear.   Eyes:      Conjunctiva/sclera: Conjunctivae normal.   Cardiovascular:      Rate and Rhythm: Normal rate and regular rhythm.      Pulses: Normal pulses.      Heart sounds: Normal heart sounds.   Pulmonary:      Effort: Pulmonary effort is normal. No respiratory distress.      Breath sounds: Normal breath sounds.   Abdominal:      General: Bowel sounds are normal.      Palpations: Abdomen is soft.      Tenderness: There is no abdominal tenderness.   Musculoskeletal:      Right shoulder: No swelling.      Left shoulder: Tenderness present. No swelling. Normal range of motion. Normal strength.      Left upper arm: No swelling or tenderness.   Skin:     General: Skin is warm and dry.      Capillary Refill: Capillary refill takes less than 2 seconds.   Neurological:      General: No focal deficit present.      Mental Status: He is alert and oriented to person, place, and time.      Motor: No weakness.   Psychiatric:         Behavior: Behavior normal.       Assessment & Plan   Diagnoses and all orders for this visit:    1. Shoulder impingement (Primary)  -     methylPREDNISolone (MEDROL) 4 MG dose pack; Take as directed on package instructions.  Dispense: 21 tablet; Refill: 0    2. Biceps tendonitis on left  -     US Nonvascular Extremity Limited; Future    3. Pectoralis muscle strain, initial encounter      Plan:     Take prescribed medications per instructions.   Patient requests ultrasound to rule out bicep rupture.  Recommended rest, icing, anti-inflammatories, topicals.  Follow-up if symptoms worsen or fail to improve.    Discussed Care Gaps, ordered referrals and encouraged vaccination updates.       - Pt agrees with plan of care and denies further questions/concerns today  - This document is intended for medical expert use only. Persons  reading this  document without medical staff guidance may result in misinterpretation and unintended morbidity     Go to the ER for any possible life-threatening symptoms such as chest pain or shortness of air.      Please allow 3-5 business days for recommendations based on new results      I personally spent time with this patient, preparing for the visit, reviewing tests, obtaining and/or reviewing a separately obtained history, performing a medically appropriate examination and/or evaluation, counseling and educating the patient/family/caregiver, ordering medications,  documenting information in the medical record and indepentently interpreting results.

## 2024-09-03 ENCOUNTER — TELEPHONE (OUTPATIENT)
Dept: FAMILY MEDICINE CLINIC | Facility: CLINIC | Age: 39
End: 2024-09-03
Payer: COMMERCIAL

## 2024-09-03 NOTE — TELEPHONE ENCOUNTER
Caller: Garrick Guo    Relationship to patient: Self    Best call back number: [phone]    Patient is needing:     STATES THAT NEITHER [facility] OR [facility] IMAGINING DOES THE TYPE OF ULTRASOUND THAT WAS ORDERED FOR HIM.     HE WOULD LIKE TO KNOW IF IT WOULD BE EASIER TO JUST GET AN MRI.     PLEASE CALL TO DISCUSS.

## 2024-09-05 DIAGNOSIS — M25.819 SHOULDER IMPINGEMENT: Primary | ICD-10-CM

## 2024-09-05 DIAGNOSIS — S29.011A PECTORALIS MUSCLE STRAIN, INITIAL ENCOUNTER: ICD-10-CM

## 2024-09-05 DIAGNOSIS — M75.22 BICEPS TENDONITIS ON LEFT: ICD-10-CM

## 2024-09-05 NOTE — TELEPHONE ENCOUNTER
Caller: Garrick Guo    Relationship: Self    Best call back number: [phone]     What was the call regarding: PATIENT IS CALLING BACK FOR A STATUS UPDATE. PLEASE CALL.

## 2024-09-06 ENCOUNTER — TELEPHONE (OUTPATIENT)
Dept: FAMILY MEDICINE CLINIC | Facility: CLINIC | Age: 39
End: 2024-09-06
Payer: COMMERCIAL

## 2024-09-06 NOTE — TELEPHONE ENCOUNTER
I spoke to patient and he wants the referral sent to [facility]..     Patient wanting to know if the order can be for both biceps. He states his right bicep is bruising and has a lump on it. Pt states he believe its from overcompensating due to the other arm being injured.If so let me know and I will send over order

## 2024-09-06 NOTE — TELEPHONE ENCOUNTER
Hub to relay    PT orders placed to [facility] out on [facility]. He can either call them to schedule or wait for them to call him.     I left detailed message informing pt

## 2024-09-06 NOTE — TELEPHONE ENCOUNTER
Caller: Garrick Guo    Relationship: Self    Best call back number: [phone]    Who are you requesting to speak with (clinical staff, provider,  specific staff member): [facility]    What was the call regarding: PATIENT IS CALLING TO CHECK IN WITH [facility] REGARDING HIS REFERRAL. PLEASE ADVISE.

## 2024-09-06 NOTE — TELEPHONE ENCOUNTER
Name: ElroymarlenekarinaGarrick whitehead TOBY      Relationship: Self      Best Callback Number: [phone]       HUB PROVIDED THE RELAY MESSAGE FROM THE OFFICE      PATIENT: HAS FURTHER QUESTIONS AND WOULD LIKE A CALL BACK AT THE FOLLOWING PHONE NUMBER [phone]     ADDITIONAL INFORMATION:

## 2024-09-09 DIAGNOSIS — M75.22 BICEPS TENDONITIS ON LEFT: ICD-10-CM

## 2024-09-09 DIAGNOSIS — M25.819 SHOULDER IMPINGEMENT: Primary | ICD-10-CM

## 2024-09-09 DIAGNOSIS — S29.011A PECTORALIS MUSCLE STRAIN, INITIAL ENCOUNTER: ICD-10-CM

## 2024-09-09 NOTE — TELEPHONE ENCOUNTER
Hub to relay    They won't do ultrasound of biceps, and highly doubt approval for MRI of BOTH biceps without PT first. If he wants to progress this, he needs to at least have a consult with Sports Med. He can let me know if that's what he wants to do. And please for the thousandth time tell him he needs to stop working out. Amy    I left detailed message of the above and asked pt to call back if wants referral to sports med.

## 2024-09-09 NOTE — TELEPHONE ENCOUNTER
Physical Therapy referral faxed as requested    Called pt to advise. JULIO CÉSAR with details    Pt can call to schedule [phone]    OK TO RELAY

## 2024-09-11 LAB
ALBUMIN SERPL-MCNC: 4.7 G/DL (ref 3.5–5.2)
ALBUMIN/GLOB SERPL: 2.4 G/DL
ALP SERPL-CCNC: 55 U/L (ref 39–117)
ALT SERPL-CCNC: 21 U/L (ref 1–41)
AST SERPL-CCNC: 29 U/L (ref 1–40)
BILIRUB SERPL-MCNC: 0.5 MG/DL (ref 0–1.2)
BUN SERPL-MCNC: 18 MG/DL (ref 6–20)
BUN/CREAT SERPL: 18.2 (ref 7–25)
CALCIUM SERPL-MCNC: 9.6 MG/DL (ref 8.6–10.5)
CHLORIDE SERPL-SCNC: 107 MMOL/L (ref 98–107)
CO2 SERPL-SCNC: 24.9 MMOL/L (ref 22–29)
CREAT SERPL-MCNC: 0.99 MG/DL (ref 0.76–1.27)
EGFRCR SERPLBLD CKD-EPI 2021: 99.4 ML/MIN/1.73
ERYTHROCYTE [DISTWIDTH] IN BLOOD BY AUTOMATED COUNT: 13 % (ref 12.3–15.4)
GLOBULIN SER CALC-MCNC: 2 GM/DL
GLUCOSE SERPL-MCNC: 80 MG/DL (ref 65–99)
HCT VFR BLD AUTO: 55 % (ref 37.5–51)
HGB BLD-MCNC: 18.7 G/DL (ref 13–17.7)
MCH RBC QN AUTO: 31.9 PG (ref 26.6–33)
MCHC RBC AUTO-ENTMCNC: 34 G/DL (ref 31.5–35.7)
MCV RBC AUTO: 93.9 FL (ref 79–97)
PLATELET # BLD AUTO: 244 10*3/MM3 (ref 140–450)
POTASSIUM SERPL-SCNC: 4.9 MMOL/L (ref 3.5–5.2)
PROT SERPL-MCNC: 6.7 G/DL (ref 6–8.5)
PSA SERPL-MCNC: NORMAL NG/ML
RBC # BLD AUTO: 5.86 10*6/MM3 (ref 4.14–5.8)
SODIUM SERPL-SCNC: 148 MMOL/L (ref 136–145)
SPECIMEN STATUS: NORMAL
TESTOST FREE SERPL-MCNC: 30 PG/ML (ref 8.7–25.1)
TESTOST SERPL-MCNC: >1500 NG/DL (ref 264–916)
WBC # BLD AUTO: 8.12 10*3/MM3 (ref 3.4–10.8)

## 2024-09-16 ENCOUNTER — OFFICE VISIT (OUTPATIENT)
Age: 39
End: 2024-09-16
Payer: COMMERCIAL

## 2024-09-16 VITALS — WEIGHT: 180.6 LBS | BODY MASS INDEX: 28.35 KG/M2 | TEMPERATURE: 98.6 F | HEIGHT: 67 IN

## 2024-09-16 DIAGNOSIS — S46.912A STRAIN OF LEFT SHOULDER, INITIAL ENCOUNTER: Primary | ICD-10-CM

## 2024-09-16 DIAGNOSIS — R20.2 NUMBNESS AND TINGLING OF UPPER EXTREMITY: ICD-10-CM

## 2024-09-16 DIAGNOSIS — R29.898 WEAKNESS OF LEFT SHOULDER: ICD-10-CM

## 2024-09-16 DIAGNOSIS — S46.212A STRAIN OF LEFT BICEPS, INITIAL ENCOUNTER: ICD-10-CM

## 2024-09-16 DIAGNOSIS — R20.0 NUMBNESS AND TINGLING OF UPPER EXTREMITY: ICD-10-CM

## 2024-09-16 DIAGNOSIS — S29.011A STRAIN OF LEFT PECTORALIS MUSCLE, INITIAL ENCOUNTER: ICD-10-CM

## 2024-09-16 PROCEDURE — 99214 OFFICE O/P EST MOD 30 MIN: CPT | Performed by: STUDENT IN AN ORGANIZED HEALTH CARE EDUCATION/TRAINING PROGRAM

## 2024-09-16 RX ORDER — MELOXICAM 7.5 MG/1
7.5 TABLET ORAL DAILY
Qty: 30 TABLET | Refills: 0 | Status: SHIPPED | OUTPATIENT
Start: 2024-09-16

## 2024-10-01 DIAGNOSIS — E29.1 HYPOGONADISM IN MALE: Primary | ICD-10-CM

## 2024-10-01 LAB
ERYTHROCYTE [DISTWIDTH] IN BLOOD BY AUTOMATED COUNT: 13.1 % (ref 12.3–15.4)
HCT VFR BLD AUTO: 56.4 % (ref 37.5–51)
HGB BLD-MCNC: 18.4 G/DL (ref 13–17.7)
MCH RBC QN AUTO: 31.5 PG (ref 26.6–33)
MCHC RBC AUTO-ENTMCNC: 32.6 G/DL (ref 31.5–35.7)
MCV RBC AUTO: 96.4 FL (ref 79–97)
PLATELET # BLD AUTO: 221 10*3/MM3 (ref 140–450)
RBC # BLD AUTO: 5.85 10*6/MM3 (ref 4.14–5.8)
WBC # BLD AUTO: 11.07 10*3/MM3 (ref 3.4–10.8)

## 2024-10-03 LAB
ESTROGEN SERPL-MCNC: 357 PG/ML (ref 56–213)
TESTOST FREE SERPL-MCNC: 40 PG/ML (ref 8.7–25.1)
TESTOST SERPL-MCNC: >1500 NG/DL (ref 264–916)

## 2024-10-03 NOTE — PROGRESS NOTES
Call results to patient.  Marginal improvement of blood counts.  I would continue frequent phlebotomy and ok change to monthly at [facility] if they are doing it for him.

## 2024-10-06 ENCOUNTER — TELEPHONE (OUTPATIENT)
Dept: FAMILY MEDICINE CLINIC | Facility: CLINIC | Age: 39
End: 2024-10-06
Payer: COMMERCIAL

## 2024-10-06 NOTE — TELEPHONE ENCOUNTER
The estrogen level rising is his body converting testosterone to estrogen to try and get rid of the excess. See results task for repeat testosterone and estrogen.

## 2024-10-06 NOTE — PROGRESS NOTES
"Mail copy of results to patient.  Testosterone very high and estrogen level up.  Estrogen up as body will convert excess testosterone to estrogen.   As always I do not recommend supplemental testosterone in normal healthy male given long-term risks and consequences.       Testosterone supplementation, while beneficial for men with clinically diagnosed testosterone deficiency, can pose significant risks when used without a clear medical need. Unnecessary testosterone supplementation can disrupt the body's natural hormone balance and may lead to a range of adverse effects, including:    Cardiovascular Risks:    Increased risk of heart attacks, strokes, and other cardiovascular events, especially in older men or those with preexisting heart conditions.  Elevated hematocrit levels (thickening of the blood), which can raise the risk of blood clots, leading to deep vein thrombosis (DVT) or pulmonary embolism.  Endocrine System Suppression:    Suppression of the body's natural testosterone production (hypogonadism), leading to testicular shrinkage and potential infertility.  Elevated estrogen levels due to aromatization of testosterone, causing gynecomastia (breast tissue development) in some men.  Prostate Health:    Risk of stimulating the growth of preexisting prostate cancer or benign prostatic hyperplasia (BPH), which can cause urinary symptoms and discomfort.  Liver Toxicity:    Oral and injectable forms of synthetic testosterone can negatively affect liver function, potentially leading to liver damage or hepatotoxicity over time.  Psychiatric and Behavioral Changes:    Mood swings, irritability, or increased aggression are common side effects of excess testosterone, often referred to as \"roid rage.\"  Increased risk of depression or other mood disorders when abruptly stopping supplementation.  Skin and Hair Changes:    Acne and oily skin are common side effects due to excess sebum production.  Accelerated male " pattern baldness in men who are genetically predisposed to hair loss.  Sleep Apnea:    Testosterone therapy can worsen or trigger obstructive sleep apnea, a condition that disrupts sleep and leads to daytime fatigue and increased cardiovascular risk.  Metabolic Effects:    Worsening of insulin resistance and an increase in body fat in some cases, which could elevate the risk of developing metabolic syndrome or type 2 diabetes.

## 2024-10-06 NOTE — TELEPHONE ENCOUNTER
----- Message from Tammy DOMINGUEZ sent at 10/3/2024 10:38 AM EDT -----  Pt advised of blood counts and voiced understanding. Pt did ask about his testosterone and estrogen levels and I gave him the numbers. Pt is more concerned about the estrogen levels and wants your opinion on that. Pt stated he has been dropping the testosterone down every time you have advised him to. Thanks.

## 2024-10-16 ENCOUNTER — OFFICE VISIT (OUTPATIENT)
Age: 39
End: 2024-10-16
Payer: COMMERCIAL

## 2024-10-16 VITALS — WEIGHT: 180 LBS | TEMPERATURE: 98.3 F | BODY MASS INDEX: 28.25 KG/M2 | HEIGHT: 67 IN

## 2024-10-16 DIAGNOSIS — R20.0 NUMBNESS AND TINGLING OF UPPER EXTREMITY: ICD-10-CM

## 2024-10-16 DIAGNOSIS — R29.898 WEAKNESS OF LEFT SHOULDER: Primary | ICD-10-CM

## 2024-10-16 DIAGNOSIS — S46.912D STRAIN OF LEFT SHOULDER, SUBSEQUENT ENCOUNTER: ICD-10-CM

## 2024-10-16 DIAGNOSIS — M79.2 RADICULAR PAIN OF LEFT UPPER EXTREMITY: ICD-10-CM

## 2024-10-16 DIAGNOSIS — R20.2 NUMBNESS AND TINGLING OF UPPER EXTREMITY: ICD-10-CM

## 2024-10-16 PROCEDURE — 99213 OFFICE O/P EST LOW 20 MIN: CPT | Performed by: STUDENT IN AN ORGANIZED HEALTH CARE EDUCATION/TRAINING PROGRAM

## 2024-10-16 NOTE — PROGRESS NOTES
Chief Complaint   Patient presents with    Left Shoulder - Follow-up, Pain       History of Present Illness  Garrick is a 38 y.o. year old RHD male here today for follow-up of left shoulder and chest wall pain that has been present since mid-August with no known injury or trauma but began after helping a weight lifting partner perform pull-ups. Upon initial assessment, I thought symptoms may be multifactorial, with some symptoms suggesting possible cervical radiculopathy.  Additionally, he had signs and symptoms consistent with an acute muscle strain and inflammation in the biceps and pectoralis major. He did have additional but less intense symptoms of the trapezius, rhomboids, levator scapula, and rotator cuff muscles which were also thought to be contributing to his discomfort. I recommended trial of conservative management with PT and meloxicam. Please see previous notes for complete history and treatment course. He returns today reporting persistent symptoms. He states that he no longer feels like there is a baseball in his armpit but he sill has diffuse pain that is dull, achy, and consistent. He has been having more symptoms in his right biceps, stating that after a long day of computer of computer work he feels it, but not as much in the pec or forearm. Feels like he has been unable to progress his weight as he would like. Has been doing US and deep tissue at PT but does not feel it is really helping much. He continues to have neck pain at the base bilaterally with pain that shoots down to left shoulder blade and worsens with certain motions. He has chronic numbness and tingling in feet and hands but that was present before. He has been taking the meloxicam and feels it helped his knees, but not the shoulder.     Of note, he has been on testosterone for a few years, but just had levels checked which was significantly elevated.  He started cutting his dose and notes that was around the time symptoms  "worsened.  Has been trying to cut back on smoking  History of Present Illness  He is established with physical therapy last week ([facility] [address]).   He was previously prescribed a steroid pack by primary care but discontinued it after the first dose due to unpleasant side effects.   He also reports experiencing severe headaches.  He has a history of epilepsy. He has thick blood and goes for therapeutic phlebotomy.     SOCIAL HISTORY  He works as a .       The following data was reviewed by: Yaneli Ward DO on 09/16/2024:  Data reviewed : FM note from 8/26/24      Lab Results   Component Value Date    TESTOSTERONE >1500 09/10/2024         Temp 98.3 °F (36.8 °C)   Ht 170.2 cm (67.01\")   Wt 81.6 kg (180 lb)   BMI 28.19 kg/m²        Physical Exam  MSK Exam:  The left shoulder is without obvious signs of acute bony deformity, erythema or ecchymosis. Continues to have tenderness at the pec major as it crosses the axilla with associated tenderness at the deltoid and biceps. There is tenderness throughout the biceps, including tenderness at the LHB in the bicipital groove, and distally at the elbow. Tendons are palpable and no visible defect/kristal deformity. There is no tenderness at the AC or SC joint. Active range of motion is full and symmetrical with mild discomfort.  Speeds and Yergason's tests are positive for pain and 4/5 weakness compared to the right. [place]'s and Elvis's tests are negative for significant pain or weakness.  IR strength in neutral is 5/5, ER strength is 4/5 and painful. Scapular function is abnormal.      The cervical spine without obvious signs of deformity, scoliosis, erythema, or ecchymosis. There is no midline tenderness. Cervical range of motion is slightly limited in rotation and side bending with left sided pain at end range. Distal upper extremity strength (including , interosseus, and pincer strength) is 4+/5, and pain-free.  Sensation is intact to light " touch. Negative Tinel's at the cubital tunnel. Spurling's test is positive on the left. No significant change compared to previous.    Cervical Spine X-Ray  Indication: Pain  Views: AP and Lateral  Findings:  Loss of normal cervical lordosis  No fracture  No bony lesions  Soft tissues normal  Disc space narrowing at C5-C6 with anterior end plate spurring  No prior studies are available for comparison.      Assessment and Plan  Diagnoses and all orders for this visit:    1. Weakness of left shoulder (Primary)  -     MRI Cervical Spine Without Contrast; Future    2. Numbness and tingling of upper extremity  -     MRI Cervical Spine Without Contrast; Future    3. Radicular pain of left upper extremity  -     MRI Cervical Spine Without Contrast; Future    4. Strain of left shoulder, subsequent encounter      Garrick is a 38 y.o. year old RHD male here today for follow-up of left shoulder and chest wall pain that has been present since mid-August with no known injury or trauma but began after helping a weight lifting partner perform pull-ups. Upon initial assessment, I thought symptoms may be multifactorial, with some symptoms suggesting possible cervical radiculopathy.  Additionally, he had signs and symptoms consistent with an acute muscle strain and inflammation in the biceps and pectoralis major. He did have additional but less intense symptoms of the trapezius, rhomboids, levator scapula, and rotator cuff muscles which were also thought to be contributing to his discomfort. I recommended trial of conservative management with PT and meloxicam. He returns today with persistent symptoms. Given his widespread symptoms, especially the weakness and numbness of the left UE, I am more concerned that his symptoms may be radicular in nature, though I cannot exclude some component of an acute muscle strain. I recommend MRI of the cervical spine to further evaluate. May continue with supportive measures such as NSAIDs, Tylenol,  and heat as needed. He may continue with activity as tolerated but stressed the importance of continuing to avoid painful or overly strenuous activity.     We will follow-up after MRI is obtained and make additional treatment recommendations accordingly.   All of his questions were answered and he is agreeable with the plan.    Dictated utilizing Dragon dictation.

## 2024-10-21 ENCOUNTER — OFFICE VISIT (OUTPATIENT)
Dept: FAMILY MEDICINE CLINIC | Facility: CLINIC | Age: 39
End: 2024-10-21
Payer: COMMERCIAL

## 2024-10-21 VITALS
OXYGEN SATURATION: 96 % | HEIGHT: 67 IN | BODY MASS INDEX: 28.63 KG/M2 | HEART RATE: 61 BPM | DIASTOLIC BLOOD PRESSURE: 70 MMHG | WEIGHT: 182.4 LBS | SYSTOLIC BLOOD PRESSURE: 128 MMHG

## 2024-10-21 DIAGNOSIS — Z51.81 ENCOUNTER FOR MONITORING TESTOSTERONE REPLACEMENT THERAPY: Primary | ICD-10-CM

## 2024-10-21 DIAGNOSIS — J22 LOWER RESPIRATORY INFECTION: ICD-10-CM

## 2024-10-21 DIAGNOSIS — Z79.890 ENCOUNTER FOR MONITORING TESTOSTERONE REPLACEMENT THERAPY: Primary | ICD-10-CM

## 2024-10-21 PROCEDURE — 99213 OFFICE O/P EST LOW 20 MIN: CPT

## 2024-10-21 RX ORDER — AZITHROMYCIN 250 MG/1
TABLET, FILM COATED ORAL
Qty: 6 TABLET | Refills: 0 | Status: SHIPPED | OUTPATIENT
Start: 2024-10-21

## 2024-10-21 NOTE — PATIENT INSTRUCTIONS

## 2024-10-21 NOTE — PROGRESS NOTES
Subjective   Garrick Guo is a 38 y.o. male.     Chief Complaint   Patient presents with    Shortness of Breath     Hurts to take deep breaths    Dizziness     X 1-2 years but worse the past 2 weeks    URI    Cough     X 2 weeks     History of Present Illness  The patient is a 38-year-old male who presents for evaluation of a cough.    Lower respiratory infection: He began experiencing a productive cough approximately 10 to 14 days ago, accompanied by intermittent lightheadedness. Since Friday, he has been experiencing pain on the left side of his chest with each breath. His breathing is not as deep as usual due to congestion. He has been using his inhaler 2 to 4 times daily. He discontinued Singulair and Zyrtec a few weeks ago. He takes Mucinex without a cough suppressant.    He reports no fevers but does experience chills. He also reports headaches behind his ears and pressure behind his eyes. He occasionally feels like he might fall over, especially when lightheaded. He experienced extreme dizziness and a racing heart after taking the first dose of a steroid pack. He had a poor night's sleep last night, which is unusual for him.    He donated blood on Saturday, which resulted in increased dizziness and lightheadedness. He has a history of pneumonia and a collapsed lung from 7 or 8 years ago. He has been taking an anti-inflammatory medication prescribed by his sports medicine doctor, which provides some relief.    Encounter for testosterone therapy monitoring: He maintains a diet rich in calories and protein and has been adjusting to lower calorie intake for the past month and a half. He requests labs to be ordered for tomorrow, as he gets them done every 3 weeks. He has been gradually reducing his testosterone intake and has not had a shot in over 2 weeks.     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    ALLERGIES  He is allergic to STEROIDS.     The following portions of the patient's history were reviewed and updated as appropriate: allergies, current medications, past family history, past medical history, past social history, past surgical history and problem list.  Results         Objective     Vitals:    10/21/24 1142   BP: 128/70   Pulse: 61   SpO2: 96%      Body mass index is 28.56 kg/m².    Physical Exam  Vitals reviewed.   Constitutional:       General: He is not in acute distress.     Appearance: Normal appearance. He is not ill-appearing or toxic-appearing.   HENT:      Right Ear: Tympanic membrane, ear canal and external ear normal.      Left Ear: Tympanic membrane, ear canal and external ear normal.      Nose: No congestion or rhinorrhea.      Mouth/Throat:      Mouth: Mucous membranes are moist.      Pharynx: Oropharynx is clear. No oropharyngeal exudate or posterior oropharyngeal erythema.   Eyes:      Conjunctiva/sclera: Conjunctivae normal.   Cardiovascular:      Rate and Rhythm: Normal rate.      Heart sounds: Normal heart sounds.   Pulmonary:      Effort: Pulmonary effort is normal. No respiratory distress.      Breath sounds: No stridor. No wheezing, rhonchi or rales.   Chest:      Chest wall: Tenderness (with inspiration) present.   Lymphadenopathy:      Cervical: No cervical adenopathy.   Skin:     General: Skin is warm and dry.      Capillary Refill: Capillary refill takes less than 2 seconds.   Neurological:      General: No focal deficit present.      Mental Status: He is alert and oriented to person, place, and time.      Motor: No weakness.   Psychiatric:         Behavior: Behavior normal.       Assessment & Plan  1. Lower Respiratory Infection  He reports a productive cough that started about 10-14 days ago. Given his history of pneumonia and current symptoms, an antibiotic is deemed necessary. A prescription for an antibiotic will be sent to [facility]. If symptoms persist  despite the antibiotic treatment, a chest x-ray will be considered.    2. Dizziness.  He has been experiencing dizziness and lightheadedness, particularly after donating blood and during activities such as working on the computer. He is advised to monitor these symptoms closely. Steroids will be marked as an allergy due to previous adverse reactions, including dizziness and heart racing.    3. Chest Pain.  He reports sharp chest pain on the left side, particularly when breathing in or coughing. This pain lingers and is associated with lightheadedness. He is advised to start the antibiotic treatment and monitor his symptoms. If the pain persists or worsens, further evaluation will be necessary.    4. Breathing Difficulty.  He reports feeling congested and not being able to take deep breaths. He has been using his inhaler 2-4 times a day. He is advised to continue using his inhaler as needed and start the prescribed antibiotic. If breathing difficulties persist, further evaluation will be necessary.    5. Headache.  He reports a headache primarily behind his ears and eyes, with associated pressure. He has been taking an anti-inflammatory medication and Mucinex without a cough suppressant. He is advised to continue these medications as needed.    6. Encounter for testosterone monitoring  He requests his routine labs for testosterone, estrogen, and red blood cell count to be called in for tomorrow. Orders have been placed for these labs.    Discussed Care Gaps, ordered referrals and encouraged vaccination updates.       - Pt agrees with plan of care and denies further questions/concerns today  - This document is intended for medical expert use only. Persons  reading this document without medical staff guidance may result in misinterpretation and unintended morbidity     Go to the ER for any possible life-threatening symptoms such as chest pain or shortness of air.      Please allow 3-5 business days for recommendations  based on new results      I personally spent time with this patient, preparing for the visit, reviewing tests, obtaining and/or reviewing a separately obtained history, performing a medically appropriate examination and/or evaluation, counseling and educating the patient/family/caregiver, ordering medications,  documenting information in the medical record and indepentently interpreting results.       Patient or patient representative verbalized consent for the use of Ambient Listening during the visit with  BOOGIE Luis for chart documentation. 10/21/2024  12:03 EDT

## 2024-10-22 ENCOUNTER — TELEPHONE (OUTPATIENT)
Dept: ORTHOPEDIC SURGERY | Facility: CLINIC | Age: 39
End: 2024-10-22
Payer: COMMERCIAL

## 2024-10-22 NOTE — TELEPHONE ENCOUNTER
Caller: Garrick Guo    Relationship: Self    Best call back number: [phone]    What orders are you requesting (i.e. lab or imaging): MRI CERVICAL SPINE WITHOUT CONTRAST.    In what timeframe would the patient need to come in: ASAP     Where will you receive your lab/imaging services: [facility]    Additional notes: INSURANCE DENIED MRI AT [facility] SO IT WAS CANCELLED INSURANCE  AUTOMATICALLY APPROVED [facility]. PATIENT IS ASKING THAT THE ORDER BE SENT TO HIS INS COMPANY AND [facility] ASAP FOR SCHEDULING.

## 2024-10-23 NOTE — TELEPHONE ENCOUNTER
Per insurance company [employer] there is no approved request showing [facility] was approved. I have uploaded records with request of [facility] as rendering facility. Once insurance fax us the approval for this we will fax approval along with MRI order to [facility]. Will contact patient appointment details once approval gets faxed to us. Scanned approval request to [facility] in .

## 2024-10-23 NOTE — TELEPHONE ENCOUNTER
Caller: Garrick Guo    Relationship to patient: Self    Best call back number: [phone] (home)     Patient is needing: PT STATED HE JUST SPOKE WITH [facility] AND THEY DO NOT HAVE THE MRI ORDER. PLEASE CALL HIM WHEN ORDER IS SENT

## 2024-10-25 NOTE — TELEPHONE ENCOUNTER
APPROVED [id number] 10-23-24 - 04-21-25  LVM TO PATIENT OF APPT FOR 11-01-24 AT 8:30 AM- WHERE TO GO, WHAT TO BRING, AND DISC INSTRUCTIONS. SCANNED WORK IN MEDIA

## 2024-10-28 LAB
BASOPHILS # BLD AUTO: 0.03 10*3/MM3 (ref 0–0.2)
BASOPHILS NFR BLD AUTO: 0.5 % (ref 0–1.5)
EOSINOPHIL # BLD AUTO: 0.36 10*3/MM3 (ref 0–0.4)
EOSINOPHIL NFR BLD AUTO: 6 % (ref 0.3–6.2)
ERYTHROCYTE [DISTWIDTH] IN BLOOD BY AUTOMATED COUNT: 12.7 % (ref 12.3–15.4)
ESTROGEN SERPL-MCNC: 200 PG/ML (ref 56–213)
HCT VFR BLD AUTO: 51.5 % (ref 37.5–51)
HGB BLD-MCNC: 16.9 G/DL (ref 13–17.7)
IMM GRANULOCYTES # BLD AUTO: 0.01 10*3/MM3 (ref 0–0.05)
IMM GRANULOCYTES NFR BLD AUTO: 0.2 % (ref 0–0.5)
LYMPHOCYTES # BLD AUTO: 1.71 10*3/MM3 (ref 0.7–3.1)
LYMPHOCYTES NFR BLD AUTO: 28.5 % (ref 19.6–45.3)
MCH RBC QN AUTO: 31.6 PG (ref 26.6–33)
MCHC RBC AUTO-ENTMCNC: 32.8 G/DL (ref 31.5–35.7)
MCV RBC AUTO: 96.3 FL (ref 79–97)
MONOCYTES # BLD AUTO: 0.73 10*3/MM3 (ref 0.1–0.9)
MONOCYTES NFR BLD AUTO: 12.2 % (ref 5–12)
NEUTROPHILS # BLD AUTO: 3.16 10*3/MM3 (ref 1.7–7)
NEUTROPHILS NFR BLD AUTO: 52.6 % (ref 42.7–76)
NRBC BLD AUTO-RTO: 0 /100 WBC (ref 0–0.2)
PLATELET # BLD AUTO: 233 10*3/MM3 (ref 140–450)
RBC # BLD AUTO: 5.35 10*6/MM3 (ref 4.14–5.8)
TESTOST FREE SERPL-MCNC: 10.5 PG/ML (ref 8.7–25.1)
TESTOST SERPL-MCNC: 605.6 NG/DL (ref 264–916)
WBC # BLD AUTO: 6 10*3/MM3 (ref 3.4–10.8)

## 2024-10-30 ENCOUNTER — OFFICE VISIT (OUTPATIENT)
Dept: FAMILY MEDICINE CLINIC | Facility: CLINIC | Age: 39
End: 2024-10-30
Payer: COMMERCIAL

## 2024-10-30 VITALS
WEIGHT: 181 LBS | HEART RATE: 69 BPM | BODY MASS INDEX: 28.41 KG/M2 | OXYGEN SATURATION: 95 % | DIASTOLIC BLOOD PRESSURE: 70 MMHG | HEIGHT: 67 IN | SYSTOLIC BLOOD PRESSURE: 118 MMHG | TEMPERATURE: 98.1 F

## 2024-10-30 DIAGNOSIS — Z87.828 HISTORY OF TRAUMATIC HEAD INJURY: ICD-10-CM

## 2024-10-30 DIAGNOSIS — R50.9 FEVER, UNSPECIFIED FEVER CAUSE: ICD-10-CM

## 2024-10-30 DIAGNOSIS — R42 DIZZINESS: Primary | ICD-10-CM

## 2024-10-30 PROCEDURE — 99214 OFFICE O/P EST MOD 30 MIN: CPT

## 2024-10-30 NOTE — PATIENT INSTRUCTIONS

## 2024-10-30 NOTE — PROGRESS NOTES
Subjective   Garrick Guo is a 38 y.o. male.     Chief Complaint   Patient presents with    Fever     History of Present Illness  The patient presents for evaluation of multiple medical concerns.    Dizziness: He reports persistent chest pain when inhaling deeply, which is less severe than before following the azithromycin recently prescribed. He also experiences ongoing dizziness and lightheadedness, which intensify after prolonged coughing. These symptoms have been present for several years and were previously diagnosed as vertigo in 2017. Per patient, he has a history of a fall resulting in a skull fracture, a collapsed left lung due to pneumonia, and a stroke, which led to a six-day period on life support. He also experienced multiple seizures and a cerebral hemorrhage. The dizziness and lightheadedness have recently returned, and he is unsure if these symptoms are related to his previous vertigo diagnosis. He has been reluctant to see a neurologist but is now open to the idea. Alcohol consumption exacerbates his dizziness.    Fever: He woke up with a fever of 100.5 degrees, which increased to 102.5 degrees later in the day. He took ibuprofen and Tylenol to manage the fever. He also reports congestion and a cough, for which he is taking Mucinex. He reports no exposure to sick individuals and works from home. He experiences fatigue and has stopped using nasal sprays due to associated dizziness. He has noticed a decrease in his red blood cell count. He has not tested positive for COVID-19. He has been taking azithromycin, which has slightly improved his chest pain. He reports no ear discomfort or sore throat but notes significant drainage. He has a small bump on his neck, which he believes is an ingrown hair. He has been taking Mucinex for about an hour and a half and feels a gargling sensation in his lungs when breathing. He has been taking oral antihistamines, which have recently started causing  dizziness. He has been monitoring his temperature every 3 to 4 hours and drinking plenty of water.    The following portions of the patient's history were reviewed and updated as appropriate: allergies, current medications, past family history, past medical history, past social history, past surgical history and problem list.    Results  Laboratory Studies  Hematocrit levels are on the border of high.       Objective     Vitals:    10/30/24 1753   BP: 118/70   Pulse: 69   Temp: 98.1 °F (36.7 °C)   SpO2: 95%      Body mass index is 28.35 kg/m².    Physical Exam  Vitals reviewed.   Constitutional:       Appearance: Normal appearance.   HENT:      Right Ear: Tympanic membrane, ear canal and external ear normal.      Left Ear: Tympanic membrane, ear canal and external ear normal.      Nose: Nose normal. No congestion or rhinorrhea.      Mouth/Throat:      Mouth: Mucous membranes are moist.      Pharynx: Oropharynx is clear. No oropharyngeal exudate or posterior oropharyngeal erythema.   Eyes:      Conjunctiva/sclera: Conjunctivae normal.   Cardiovascular:      Rate and Rhythm: Normal rate and regular rhythm.   Pulmonary:      Effort: Pulmonary effort is normal. No respiratory distress.      Breath sounds: Normal breath sounds.   Musculoskeletal:         General: Normal range of motion.      Cervical back: No tenderness.   Lymphadenopathy:      Cervical: No cervical adenopathy.   Skin:     General: Skin is warm and dry.   Neurological:      Mental Status: He is alert and oriented to person, place, and time.   Psychiatric:         Behavior: Behavior normal.       Assessment & Plan  1. Dizziness and lightheadedness.  He reports persistent dizziness and lightheadedness, which worsens after prolonged coughing. Given his history of stroke and previous diagnosis of vertigo, a referral to a neurologist is deemed appropriate for further evaluation. He will be referred to a neurologist at [facility] for further assessment.    2.  Fever.  He has a fever that started today, reaching 102.5°F. His lungs are clear, suggesting that the fever may be due to a viral infection or allergies. He is advised to continue with ibuprofen 200 mg and Tylenol to manage the fever. A follow-up will be conducted on Friday via [facility] to assess his condition.    3. Chest pain.  He reports that the chest pain has improved slightly but is still present when taking deep breaths. He is advised to continue using Mucinex to manage his symptoms.    4. Congestion and cough.  He is currently taking Mucinex, which he feels helps manage his cough. He is advised to continue with Mucinex and avoid cough suppressants to allow for productive coughing.    Follow-up  Return in 3 days for follow-up via [facility].     Discussed Care Gaps, ordered referrals and encouraged vaccination updates.       - Pt agrees with plan of care and denies further questions/concerns today  - This document is intended for medical expert use only. Persons  reading this document without medical staff guidance may result in misinterpretation and unintended morbidity     Go to the ER for any possible life-threatening symptoms such as chest pain or shortness of air.      Please allow 3-5 business days for recommendations based on new results      I personally spent time with this patient, preparing for the visit, reviewing tests, obtaining and/or reviewing a separately obtained history, performing a medically appropriate examination and/or evaluation, counseling and educating the patient/family/caregiver, ordering medications,  documenting information in the medical record and indepentently interpreting results.       Patient or patient representative verbalized consent for the use of Ambient Listening during the visit with  BOOGIE Luis for chart documentation. 10/30/2024  19:17 EDT

## 2024-11-06 NOTE — PROGRESS NOTES
MRI done on 11/1/24 by [facility]. Report scanned in on 11/4/2024   Finasteride Pregnancy And Lactation Text: This medication is absolutely contraindicated during pregnancy. It is unknown if it is excreted in breast milk.

## 2024-11-07 ENCOUNTER — OFFICE VISIT (OUTPATIENT)
Dept: SPORTS MEDICINE | Facility: CLINIC | Age: 39
End: 2024-11-07
Payer: COMMERCIAL

## 2024-11-07 VITALS — WEIGHT: 181 LBS | HEIGHT: 67 IN | TEMPERATURE: 98 F | BODY MASS INDEX: 28.41 KG/M2

## 2024-11-07 DIAGNOSIS — R29.898 WEAKNESS OF LEFT SHOULDER: ICD-10-CM

## 2024-11-07 DIAGNOSIS — M79.2 RADICULAR PAIN OF LEFT UPPER EXTREMITY: ICD-10-CM

## 2024-11-07 DIAGNOSIS — R20.0 NUMBNESS AND TINGLING OF UPPER EXTREMITY: ICD-10-CM

## 2024-11-07 DIAGNOSIS — M48.02 CERVICAL SPINAL STENOSIS: ICD-10-CM

## 2024-11-07 DIAGNOSIS — R20.2 NUMBNESS AND TINGLING OF UPPER EXTREMITY: ICD-10-CM

## 2024-11-07 DIAGNOSIS — M54.12 CERVICAL RADICULOPATHY: Primary | ICD-10-CM

## 2024-11-07 PROCEDURE — 99213 OFFICE O/P EST LOW 20 MIN: CPT | Performed by: STUDENT IN AN ORGANIZED HEALTH CARE EDUCATION/TRAINING PROGRAM

## 2024-11-07 NOTE — PROGRESS NOTES
Subjective   History of Present Illness: Garrick Guo is a 38 y.o. male is being seen for consultation today at the request of Yaneli Ward DO.  Patient has he has been struggling with a number of symptoms of headaches, dizziness, neck pain, low back pain over the years and was recently evaluated by sports medicine for symptoms of pain radiating into his left shoulder and arm and they treated his left shoulder and arm realizing that he may be having radicular symptoms.  Ultimately an MRI was ordered and he was recommended to seek neurosurgical consultation.  Patient notes that he has had a progressive onset of stiffness and pain in his neck with some suboccipital headaches associated with pain that would radiate into one or both arms mostly the left shoulder and upper arm with a numb sensation in the hands left greater than right.  He is a very fit active individual and does do power lifting as a hobby.  He has an appointment to see neurology for his headaches and dizziness next year.    He has a history of back problems as well and has had left-sided sciatica which resolved after treatment.    History of Present Illness    Tobacco Use: High Risk (11/12/2024)    Patient History     Smoking Tobacco Use: Every Day     Smokeless Tobacco Use: Former     Passive Exposure: Not on file        The following portions of the patient's history were reviewed and updated as appropriate: allergies, current medications, past family history, past medical history, past social history, past surgical history and problem list.    Review of Systems   Musculoskeletal:  Positive for neck pain and neck stiffness.   Neurological:  Positive for dizziness, light-headedness and headaches. Negative for weakness.   All other systems reviewed and are negative.      Objective     Vitals:    11/12/24 1438   BP: 122/78   BP Location: Left arm   Patient Position: Sitting   Cuff Size: Adult   Resp: 20   Weight: 82.1 kg (181 lb)   Height:  "170.2 cm (67.01\")   PainSc:   3   PainLoc: Neck     Body mass index is 28.34 kg/m².      Physical Exam  Neurological Exam    Physical Exam:    CONSTITUTIONAL: This 38 year old  male appears well developed with an athletic build, well-nourished and in no acute distress although he is very light sensitive and turned the lights off in the exam room while he was waiting for me.    HEAD & FACE: the head and face are symmetric, normocephalic and atraumatic.    EYES: Inspection of the conjunctivae and lids reveals no swelling, erythema or discharge.  Pupils are round, equal and reactive to light and there is no scleral icterus.    EARS, NOSE, MOUTH & THROAT: On inspection, the ears and nose are within normal limits.    NECK: the neck is generally stiff heavily muscular and he does have decreased range of motion in lateral bending left greater than right at about 15 degrees extension to about 15 degrees flexion he can nearly get his chin to his chest and rotation appears to be normal rotation. The trachea is midline with no masses.      PULMONARY: Respiratory effort is normal with no increased work of breathing or signs of respiratory distress.    CARDIOVASCULAR:  Examination of the extremities shows no edema or varicosities.    MUSCULOSKELETAL: Gait and station are within normal limits. The cervical spine has no tenderness in the midline.    SKIN: The skin is warm, dry and intact    NEUROLOGIC:   Cranial Nerves 2-12 intact  Normal motor strength noted confrontational testing of both upper extremities. Muscle bulk and tone are normal.  Sensory exam is normal to fine touch to confrontational testing bilaterally  Reflexes on the right side demonstrates 2/4 Triceps Reflex, 2/4 Biceps Reflex, 2/4 Brachioradialis Reflex on the right.   Reflexes on the left side demonstrates 2/4 Triceps Reflex, 2/4 Biceps Reflex, 2/4 Brachioradialis Reflex on the left.  Superficial/Primitive Reflexes: primitive reflexes were absent.  " Martinez's, Babinski, and Clonus signs all negative.  No coordination deficit observed.  Radicular testing showed a negative Dayron (JOHN PAUL) test and negative straight leg raise.  Cortical function is intact and without deficits. Speech is normal.    PSYCHIATRIC: oriented to person, place and time. Patient's mood and affect are normal.    Assessment & Plan   Independent Review of Radiographic Studies:      I personally reviewed the images from the following studies.    MRI of the lumbar spine done on November 1, 2024 at [facility] reveals multilevel disc bulges identified the cervical spine most significant findings at C5-C6 where there is effacement of the ventral CSF space and contact of the anterior cord without cord compression and moderate to severe neuroforaminal narrowing bilaterally.  There is also left greater than right neuroforaminal narrowing at C6-C7.        Medical Decision Making:      I do not detect a specific radiculopathy or myelopathy on clinical exam although he does have a generally narrow spinal canal without cord compression or signal change in the cord.  Given his pattern of symptomatology with neck pain and left arm symptoms conservative efforts are often effective and I have suggested he seek consultation with a physical therapist and try cervical traction.  Offered to have him evaluated by an interventional pain management specialist for cervical epidural steroid injections but he thought that that was not something he would be interested in at this time.  I will see him back upon completion of physical therapy to see how much improvement he gets.    Return in about 4 weeks (around 12/10/2024) for discussion of Physical Therapy results.    Diagnoses and all orders for this visit:    1. Neck pain, chronic (Primary)  -     Ambulatory Referral to Physical Therapy for Evaluation & Treatment    2. Left arm pain  -     Ambulatory Referral to Physical Therapy for Evaluation &  Treatment    3. Spinal stenosis in cervical region  -     Ambulatory Referral to Physical Therapy for Evaluation & Treatment             Uriah Weber MD FACS [facility]  Neurological Surgery

## 2024-11-11 ENCOUNTER — TELEPHONE (OUTPATIENT)
Dept: FAMILY MEDICINE CLINIC | Facility: CLINIC | Age: 39
End: 2024-11-11
Payer: COMMERCIAL

## 2024-11-11 DIAGNOSIS — E29.1 HYPOGONADISM IN MALE: Primary | ICD-10-CM

## 2024-11-12 ENCOUNTER — OFFICE VISIT (OUTPATIENT)
Dept: NEUROSURGERY | Facility: CLINIC | Age: 39
End: 2024-11-12
Payer: COMMERCIAL

## 2024-11-12 VITALS
BODY MASS INDEX: 28.41 KG/M2 | SYSTOLIC BLOOD PRESSURE: 122 MMHG | RESPIRATION RATE: 20 BRPM | DIASTOLIC BLOOD PRESSURE: 78 MMHG | HEIGHT: 67 IN | WEIGHT: 181 LBS

## 2024-11-12 DIAGNOSIS — G89.29 NECK PAIN, CHRONIC: Primary | ICD-10-CM

## 2024-11-12 DIAGNOSIS — M79.602 LEFT ARM PAIN: ICD-10-CM

## 2024-11-12 DIAGNOSIS — M48.02 SPINAL STENOSIS IN CERVICAL REGION: ICD-10-CM

## 2024-11-12 DIAGNOSIS — M54.2 NECK PAIN, CHRONIC: Primary | ICD-10-CM

## 2024-11-12 PROCEDURE — 99204 OFFICE O/P NEW MOD 45 MIN: CPT | Performed by: NEUROLOGICAL SURGERY

## 2024-11-15 LAB
ERYTHROCYTE [DISTWIDTH] IN BLOOD BY AUTOMATED COUNT: 12.4 % (ref 12.3–15.4)
ESTROGEN SERPL-MCNC: 158 PG/ML (ref 56–213)
HCT VFR BLD AUTO: 52 % (ref 37.5–51)
HGB BLD-MCNC: 17.4 G/DL (ref 13–17.7)
MCH RBC QN AUTO: 31.5 PG (ref 26.6–33)
MCHC RBC AUTO-ENTMCNC: 33.5 G/DL (ref 31.5–35.7)
MCV RBC AUTO: 94 FL (ref 79–97)
PLATELET # BLD AUTO: 202 10*3/MM3 (ref 140–450)
RBC # BLD AUTO: 5.53 10*6/MM3 (ref 4.14–5.8)
TESTOST SERPL-MCNC: 101 NG/DL (ref 264–916)
WBC # BLD AUTO: 6 10*3/MM3 (ref 3.4–10.8)

## 2024-11-17 DIAGNOSIS — N40.0 BENIGN PROSTATIC HYPERPLASIA WITHOUT LOWER URINARY TRACT SYMPTOMS: ICD-10-CM

## 2024-11-17 DIAGNOSIS — E23.0 HYPOPITUITARISM: ICD-10-CM

## 2024-11-17 DIAGNOSIS — E29.1 HYPOGONADISM IN MALE: Primary | ICD-10-CM

## 2024-11-17 NOTE — PROGRESS NOTES
"Call results to patient.  Testosterone is now low, I assume completely off testosterone?  This is likely due to testicular atrophy (shrinkage).   I recommend stay off exogenous testosterone due to serious risks (at least 4 more weeks) to recheck and see if come back and also need to check pituitary (a gland in brain that manages all of our hormones).       Taking exogenous testosterone (testosterone from outside the body, like injections, gels, or pills) can cause testicular atrophy (shrinkage of the testicles) because it disrupts the natural hormone balance and communication system in your body.    Here's a simple explanation:  How your body normally works:    Your brain (specifically, the hypothalamus and pituitary gland) controls how much testosterone your body needs.  It sends signals to your testicles to produce testosterone naturally.  What happens when you take extra testosterone:    When you take testosterone from outside, your brain senses there's already \"enough\" in your body.  As a result, it stops sending signals to your testicles to make more testosterone.  Why the testicles shrink:    Without those signals, your testicles stop doing their main job-producing testosterone and sperm.  Over time, they can shrink because they're not being used as much.  Is it permanent?  In most cases, the testicles can return to their normal size if you stop taking exogenous testosterone and allow your body to restart its natural production.  This process might take time.    If still stays abnormal, I would recommend seeing Endocrinology for evaluation."

## 2024-11-25 NOTE — PROGRESS NOTES
Chief Complaint   Patient presents with    Left Shoulder - Follow-up    Cervical Spine - Follow-up     MRI        History of Present Illness  Garrick is a 38 y.o. year old RHD male here today for follow-up of left shoulder and chest wall pain that has been present since mid-August with no known injury or trauma but began after helping a weight lifting partner perform pull-ups. Upon initial assessment, I thought symptoms may be multifactorial, with some symptoms suggesting possible cervical radiculopathy.  Additionally, he had signs and symptoms consistent with an acute muscle strain and inflammation in the biceps and pectoralis major. He did have additional but less intense symptoms of the trapezius, rhomboids, levator scapula, and rotator cuff muscles which were also thought to be contributing to his discomfort. I recommended trial of conservative management with PT and meloxicam. Please see previous notes for complete history and treatment course.   History of Present Illness  He reports a slight pain in his anterior chest wall that extends down to his biceps, forearm, and pinky. He experiences sharp pains that radiate through his forearm when he pushes back on his wrist. He also mentions a mild neck pain that extends down his back. Certain movements during his workouts, such as chest flies, seem to exacerbate his pain. He describes a sensation of his wrist, thumb, and arm feeling as if they are about to snap in half during these exercises. His strength remains unchanged since his last visit. He has been using bands for stretching and performing a backpack stretch, which provides temporary relief for an hour or two. This stretch also seems to alleviate his bicep pain. He has been attempting to correct his posture during certain lifts as he has noticed his head tilting to the right.    He also mentions that his MRI exacerbated his dizziness and lightheadedness. He is about to see a neurologist for these symptoms. He  "has a history of vertigo and epilepsy.    SOCIAL HISTORY  He works as a .       The following data was reviewed by: Yaneli Ward DO on 11/7/2024:  Data reviewed :                Temp 98 °F (36.7 °C) (Infrared)   Ht 170.2 cm (67\")   Wt 82.1 kg (181 lb)   BMI 28.35 kg/m²        Physical Exam  MSK Exam:  The left shoulder is without obvious signs of acute bony deformity, erythema or ecchymosis. Continues to have tenderness at the pec major as it crosses the axilla with associated tenderness at the deltoid and biceps. There is tenderness throughout the biceps, including tenderness at the LHB in the bicipital groove, and distally at the elbow. Tendons are palpable and no visible defect/kristal deformity. There is no tenderness at the AC or SC joint. Active range of motion is full and symmetrical with mild discomfort at end range.  Speeds and Yergason's tests are positive for pain and 4/5 weakness compared to the right. [place]acao's and Elvis's tests are negative for significant pain or weakness.  IR strength in neutral is 5/5, ER strength is 4/5 and painful. Scapular function is abnormal.      The cervical spine without obvious signs of deformity, scoliosis, erythema, or ecchymosis. There is no midline tenderness. Cervical range of motion is slightly limited in rotation and side bending with left sided pain at end range. Distal upper extremity strength (including , interosseus, and pincer strength) is 4+/5, and pain-free.  Sensation is intact to light touch. Negative Tinel's at the cubital tunnel. Spurling's test is positive on the left. No significant change compared to previous.      Assessment and Plan  Diagnoses and all orders for this visit:    1. Cervical radiculopathy (Primary)  -     Ambulatory Referral to Neurosurgery    2. Cervical spinal stenosis  -     Ambulatory Referral to Neurosurgery    3. Weakness of left shoulder  -     Ambulatory Referral to Neurosurgery    4. Numbness and tingling " of upper extremity  -     Ambulatory Referral to Neurosurgery    5. Radicular pain of left upper extremity  -     Ambulatory Referral to Neurosurgery      Garrick is a 38 y.o. year old RHD male here today for follow-up of left shoulder and chest wall pain that has been present since mid-August with no known injury or trauma but began after helping a weight lifting partner perform pull-ups. Upon initial assessment, I thought symptoms may be multifactorial, with some symptoms suggesting possible cervical radiculopathy.  Additionally, he had signs and symptoms consistent with an acute muscle strain and inflammation in the biceps and pectoralis major. He did have additional but less intense symptoms of the trapezius, rhomboids, levator scapula, and rotator cuff muscles which were also thought to be contributing to his discomfort. I recommended trial of conservative management with PT and meloxicam. He returned with persistent symptoms. Given his widespread symptoms, especially the weakness and numbness of the left UE, I was more concerned that his symptoms may be radicular in nature, though I could not exclude some component of an acute muscle strain. I recommend MRI of the cervical spine to further evaluate.  Assessment & Plan  1. Cervical Radiculopathy.  The patient's MRI results indicate significant stenosis at multiple levels, particularly at C5-C6, causing moderate to severe nerve compression. Discussed that with severe compression it can potentially cause permanent nerve damage if not addressed. A referral to a neurosurgeon is recommended for further evaluation and to discuss treatment options, including possible injections or surgery. The patient is advised to keep me updated on his condition and any decisions made by the neurosurgeon.    2. Dizziness.  The patient reports dizziness and lightheadedness, which may be related to his cervical radiculopathy. He is scheduled to see a neurologist for further evaluation.  The patient is advised to discuss his symptoms with the neurologist to determine if they are related to his neck issues or if there is another underlying cause.    3. Pain Management.  The patient is currently not taking any medications for pain but is using stretching exercises, including the backpack stretch, which provides temporary relief. He is informed that steroid injections, such as cortisone, may be considered to reduce inflammation and pain if recommended by the neurosurgeon.    May continue with supportive measures such as NSAIDs, Tylenol, and heat as needed. He may continue with activity as tolerated but stressed the importance of continuing to avoid painful or overly strenuous activity.     We will follow-up as needed.   All of his questions were answered and he is agreeable with the plan.    Dictated utilizing Dragon dictation.  Patient or patient representative verbalized consent for the use of Ambient Listening during the visit with  Yaneli Ward DO for chart documentation. 11/7/2024  09:22 EST

## 2024-12-02 ENCOUNTER — TELEPHONE (OUTPATIENT)
Dept: FAMILY MEDICINE CLINIC | Facility: CLINIC | Age: 39
End: 2024-12-02
Payer: COMMERCIAL

## 2024-12-02 NOTE — TELEPHONE ENCOUNTER
Caller: Garrick Guo    Relationship: Self    Best call back number: [phone]     What orders are you requesting (i.e. lab or imaging): LABS, SAME AS LAST TIME    Where will you receive your lab/imaging services: IN OFFICE    Additional notes: PATIENT STATES THAT HE GETS LABS EVERY 3 WEEKS, AND IS DUE FOR FOLLOW UP. REQUESTS ORDERS FOR LABS TOMORROW

## 2024-12-05 LAB
ACTH PLAS-MCNC: 35.7 PG/ML (ref 7.2–63.3)
ALBUMIN SERPL-MCNC: 4.5 G/DL (ref 4.1–5.1)
ALP SERPL-CCNC: 44 IU/L (ref 44–121)
ALT SERPL-CCNC: 31 IU/L (ref 0–44)
AST SERPL-CCNC: 24 IU/L (ref 0–40)
BILIRUB SERPL-MCNC: 0.3 MG/DL (ref 0–1.2)
BUN SERPL-MCNC: 24 MG/DL (ref 6–20)
BUN/CREAT SERPL: 26 (ref 9–20)
CALCIUM SERPL-MCNC: 9.3 MG/DL (ref 8.7–10.2)
CHLORIDE SERPL-SCNC: 106 MMOL/L (ref 96–106)
CO2 SERPL-SCNC: 26 MMOL/L (ref 20–29)
CREAT SERPL-MCNC: 0.91 MG/DL (ref 0.76–1.27)
EGFRCR SERPLBLD CKD-EPI 2021: 110 ML/MIN/1.73
ERYTHROCYTE [DISTWIDTH] IN BLOOD BY AUTOMATED COUNT: 12.7 % (ref 11.6–15.4)
ESTROGEN SERPL-MCNC: 105 PG/ML (ref 56–213)
FSH SERPL-ACNC: 1.3 MIU/ML (ref 1.5–12.4)
GLOBULIN SER CALC-MCNC: 1.8 G/DL (ref 1.5–4.5)
GLUCOSE SERPL-MCNC: 89 MG/DL (ref 70–99)
HCT VFR BLD AUTO: 49.7 % (ref 37.5–51)
HGB BLD-MCNC: 16.4 G/DL (ref 13–17.7)
LH SERPL-ACNC: 2.7 MIU/ML (ref 1.7–8.6)
MCH RBC QN AUTO: 31.5 PG (ref 26.6–33)
MCHC RBC AUTO-ENTMCNC: 33 G/DL (ref 31.5–35.7)
MCV RBC AUTO: 95 FL (ref 79–97)
PLATELET # BLD AUTO: 195 X10E3/UL (ref 150–450)
POTASSIUM SERPL-SCNC: 4.4 MMOL/L (ref 3.5–5.2)
PROLACTIN SERPL-MCNC: 27.9 NG/ML (ref 3.9–22.7)
PROT SERPL-MCNC: 6.3 G/DL (ref 6–8.5)
PSA SERPL-MCNC: 0.3 NG/ML (ref 0–4)
RBC # BLD AUTO: 5.21 X10E6/UL (ref 4.14–5.8)
SHBG SERPL-SCNC: 50.3 NMOL/L (ref 16.5–55.9)
SODIUM SERPL-SCNC: 143 MMOL/L (ref 134–144)
TESTOST FREE SERPL-MCNC: 3.3 PG/ML (ref 8.7–25.1)
TESTOST SERPL-MCNC: 86 NG/DL (ref 264–916)
WBC # BLD AUTO: 6.3 X10E3/UL (ref 3.4–10.8)

## 2024-12-06 NOTE — PROGRESS NOTES
"Subjective   History of Present Illness: Garrick Guo is a 38 y.o. male is here today for follow-up after going to PT for ongoing neck pain and left arm pain.    Today, Mr. Guo reports that he has more mobility in his neck and less stiffness.  He states that he is having numbness and tingling intermittently in his hands but it has gotten better with PT. he says he mostly gets numbness in the right hand when he types at a computer.  He has noticed that his neck is got better he has gotten some discomfort and some crepitus in the mid to low back.  He does exercises to help with that as well.  Overall he is improved and does not feel like he needs any additional medical management for his neck or his back.    History of Present Illness    Tobacco Use: High Risk (12/17/2024)    Patient History     Smoking Tobacco Use: Every Day     Smokeless Tobacco Use: Former     Passive Exposure: Not on file        The following portions of the patient's history were reviewed and updated as appropriate: allergies, current medications, past family history, past medical history, past social history, past surgical history and problem list.    Review of Systems   Gastrointestinal:  Negative for nausea and vomiting.   Musculoskeletal:  Positive for neck pain and neck stiffness.   Neurological:  Positive for dizziness, weakness, light-headedness, numbness and headaches.       Objective     Vitals:    12/17/24 1029   BP: 116/58   Pulse: 60   Resp: 16   Temp: 97.3 °F (36.3 °C)   TempSrc: Skin   SpO2: 99%   Weight: 82.1 kg (181 lb)   Height: 170.2 cm (67\")   PainSc:   2   PainLoc: Neck     Body mass index is 28.35 kg/m².      Physical Exam  Neurological Exam    Physical Exam:    CONSTITUTIONAL:  appears well developed, well-nourished and in no acute distress although he has to turn off the lights in the room and he is wearing sunglasses today because of light sensitivity.  He tells me he has an appointment with neurology " next month to evaluate his dizziness and light sensitivity.    NECK: the neck is supple and symmetric. The trachea is midline with no masses.      PULMONARY: Respiratory effort is normal with no increased work of breathing or signs of respiratory distress.    CARDIOVASCULAR: Pedal pulses are +2/4 bilaterally. Examination of the extremities shows no edema or varicosities.    MUSCULOSKELETAL: Gait normal    SKIN: The skin is warm, dry and intact.      NEUROLOGIC:   Normal motor strength noted. Muscle bulk and tone are normal.  Sensory exam is normal to fine touch  Reflexes are normal and symmetrical in both upper extremities  Emma's negative bilaterally  Spurling's maneuver is negative  Cortical function is intact and without deficits. Speech is normal.    PSYCHIATRIC: oriented to person, place and time. Patient's mood and affect are normal.    Assessment & Plan   Independent Review of Radiographic Studies:      I personally reviewed the images from the following studies.      MRI of the lumbar spine done on November 1, 2024 at [facility] reveals multilevel disc bulges identified the cervical spine most significant findings at C5-C6 where there is effacement of the ventral CSF space and contact of the anterior cord without cord compression and moderate to severe neuroforaminal narrowing bilaterally.  There is also left greater than right neuroforaminal narrowing at C6-C7.         Medical Decision Making:      He has done well with physical therapy and although he does have some acquired spinal stenosis superimposed upon some congenital narrowing he does not have myelopathy or radiculopathy on clinical exam.  Since he improved with conservative measures there is no role for any neurosurgical intervention.  Should he develop progressive or intractable neck pain with radicular symptoms in the future I be happy to reevaluate.    Return for any new or recurrent neurosurgical problems.    Diagnoses and all orders  for this visit:    1. Neck pain, chronic (Primary)    2. Spinal stenosis in cervical region             Uriah Weber MD FACS [facility]  Neurological Surgery

## 2024-12-08 DIAGNOSIS — E22.1 HYPERPROLACTINEMIA: Primary | ICD-10-CM

## 2024-12-08 DIAGNOSIS — E29.1 HYPOGONADISM IN MALE: ICD-10-CM

## 2024-12-09 ENCOUNTER — TELEPHONE (OUTPATIENT)
Dept: FAMILY MEDICINE CLINIC | Facility: CLINIC | Age: 39
End: 2024-12-09
Payer: COMMERCIAL

## 2024-12-09 NOTE — TELEPHONE ENCOUNTER
Caller: Garrick Guo    Relationship: Self    Best call back number: [phone]     Who are you requesting to speak with (clinical staff, provider,  specific staff member): REGINALDO     What was the call regarding: HAS QUESTION ON TEST RESULTS AND ABOUT THE MRI DR. TAYLOR WANTS TO DO PLEASE CALL AND ADVISE

## 2024-12-10 ENCOUNTER — TELEPHONE (OUTPATIENT)
Dept: FAMILY MEDICINE CLINIC | Facility: CLINIC | Age: 39
End: 2024-12-10
Payer: COMMERCIAL

## 2024-12-10 NOTE — TELEPHONE ENCOUNTER
Patient has been w/o testosterone for 90 days and cannot get radiology done until 1/2/25.  He also is waiting to get scheduled with Dr Peterson for Endocrinology. He is losing strength and gaining weight and is asking if Dr Benjamin could send him something in until he can get to Endo and radiology.    States his numbers have been dropping and Dr Benjamin is aware    [phone]  Patient is asking for a callback to discuss  Ok to LMOVM or thru my chart

## 2024-12-10 NOTE — TELEPHONE ENCOUNTER
Called patient, LMOVM, to advise MRI and US orders have been faxed to [facility] for scheduling and prior authorization if needed.

## 2024-12-17 ENCOUNTER — OFFICE VISIT (OUTPATIENT)
Dept: NEUROSURGERY | Facility: CLINIC | Age: 39
End: 2024-12-17
Payer: COMMERCIAL

## 2024-12-17 VITALS
OXYGEN SATURATION: 99 % | HEART RATE: 60 BPM | RESPIRATION RATE: 16 BRPM | WEIGHT: 181 LBS | TEMPERATURE: 97.3 F | SYSTOLIC BLOOD PRESSURE: 116 MMHG | HEIGHT: 67 IN | DIASTOLIC BLOOD PRESSURE: 58 MMHG | BODY MASS INDEX: 28.41 KG/M2

## 2024-12-17 DIAGNOSIS — G89.29 NECK PAIN, CHRONIC: Primary | ICD-10-CM

## 2024-12-17 DIAGNOSIS — M48.02 SPINAL STENOSIS IN CERVICAL REGION: ICD-10-CM

## 2024-12-17 DIAGNOSIS — M54.2 NECK PAIN, CHRONIC: Primary | ICD-10-CM

## 2024-12-17 PROCEDURE — 99213 OFFICE O/P EST LOW 20 MIN: CPT | Performed by: NEUROLOGICAL SURGERY

## 2024-12-30 ENCOUNTER — OFFICE VISIT (OUTPATIENT)
Dept: FAMILY MEDICINE CLINIC | Facility: CLINIC | Age: 39
End: 2024-12-30
Payer: COMMERCIAL

## 2024-12-30 VITALS
WEIGHT: 185.2 LBS | HEART RATE: 73 BPM | TEMPERATURE: 98.3 F | BODY MASS INDEX: 29.07 KG/M2 | HEIGHT: 67 IN | DIASTOLIC BLOOD PRESSURE: 62 MMHG | OXYGEN SATURATION: 98 % | SYSTOLIC BLOOD PRESSURE: 118 MMHG

## 2024-12-30 DIAGNOSIS — J01.10 ACUTE NON-RECURRENT FRONTAL SINUSITIS: Primary | ICD-10-CM

## 2024-12-30 DIAGNOSIS — R05.1 ACUTE COUGH: ICD-10-CM

## 2024-12-30 PROCEDURE — 99213 OFFICE O/P EST LOW 20 MIN: CPT | Performed by: NURSE PRACTITIONER

## 2024-12-30 RX ORDER — DEXTROMETHORPHAN HYDROBROMIDE AND PROMETHAZINE HYDROCHLORIDE 15; 6.25 MG/5ML; MG/5ML
5 SYRUP ORAL
Qty: 180 ML | Refills: 0 | Status: SHIPPED | OUTPATIENT
Start: 2024-12-30

## 2024-12-30 RX ORDER — CLINDAMYCIN HYDROCHLORIDE 300 MG/1
300 CAPSULE ORAL 2 TIMES DAILY
Qty: 14 CAPSULE | Refills: 0 | Status: SHIPPED | OUTPATIENT
Start: 2024-12-30

## 2024-12-30 RX ORDER — DEXTROMETHORPHAN HYDROBROMIDE AND PROMETHAZINE HYDROCHLORIDE 15; 6.25 MG/5ML; MG/5ML
5 SYRUP ORAL 4 TIMES DAILY PRN
Qty: 180 ML | Refills: 0 | Status: SHIPPED | OUTPATIENT
Start: 2024-12-30 | End: 2024-12-30

## 2024-12-30 NOTE — PROGRESS NOTES
Subjective   Garrick Guo is a 39 y.o. male. Presents today for   Chief Complaint   Patient presents with    Cough    Mass     Right hip and causing discomfort for about 5 weeks    Sinus Problem    Breathing Problem     Sharp pain with when breathing in sometimes    Tinnitus     Mostly left ear       History Of Present Illness Garrick Guo is a 39-year-old male presenting today for a 6-month follow-up from Dr. Benjamin for dyslipidemia    History of Present Illness      Patient Active Problem List   Diagnosis    Epilepsy    Vertigo    Substance abuse    Hyperlipidemia    Tinea pedis of both feet    Other headache syndrome    Erythrocytosis    Subarachnoid hemorrhage    Numbness of upper extremity    Cigarette smoker    High risk medication use    Neck pain, chronic    Left arm pain    Spinal stenosis in cervical region       Social History     Socioeconomic History    Marital status: Single   Tobacco Use    Smoking status: Every Day     Current packs/day: 2.00     Average packs/day: 2.0 packs/day for 23.0 years (46.0 ttl pk-yrs)     Types: Cigarettes     Start date: 1/1/2002    Smokeless tobacco: Former     Quit date: 2018   Vaping Use    Vaping status: Never Used   Substance and Sexual Activity    Alcohol use: No    Drug use: Yes     Types: Marijuana    Sexual activity: Yes     Partners: Female       Allergies   Allergen Reactions    Amoxicillin Other (See Comments)     Paralysis    Bactrim [Sulfamethoxazole-Trimethoprim] Seizure    Doxycycline Other (See Comments)     Seizure/memory loss    *Pt states he has tolerated and that the seizure was caused by a steroid pack    Medrol [Methylprednisolone] Dizziness    Other Other (See Comments)     PT STATES STEROID PACK CAUSED SEIZURE/MEMORY LOSS, UNSURE OF NAME       Current Outpatient Medications on File Prior to Visit   Medication Sig Dispense Refill    Accu-Chek Softclix Lancets lancets PT TO CHECK BS  each 3    albuterol sulfate  (90  "Base) MCG/ACT inhaler Inhale 2 puffs Every 4 (Four) Hours As Needed for Wheezing or Shortness of Air. 18 g 3    Blood Glucose Monitoring Suppl (Accu-Chek Sara Plus) w/Device kit PT TO CHECK BS BID DX HYPOGLYCEMIA  DX R73.03 1 kit 0    glucose blood test strip TO CHECK BS  each 3    levETIRAcetam (KEPPRA) 1000 MG tablet Take 1 tablet by mouth Every 12 (Twelve) Hours. 180 tablet 3     No current facility-administered medications on file prior to visit.       Objective   Vitals:    12/30/24 1649   BP: 118/62   Pulse: 73   Temp: 98.3 °F (36.8 °C)   TempSrc: Oral   SpO2: 98%   Weight: 84 kg (185 lb 3.2 oz)   Height: 170.2 cm (67\")     Body mass index is 29.01 kg/m².    Physical Exam    Physical Exam  Constitutional:       Appearance: Normal appearance.   HENT:      Head: Normocephalic and atraumatic.   Cardiovascular:      Rate and Rhythm: Normal rate and regular rhythm.      Pulses: Normal pulses.      Heart sounds: Normal heart sounds.   Pulmonary:      Effort: Pulmonary effort is normal.      Breath sounds: Normal breath sounds.   Abdominal:      General: Bowel sounds are normal.   Musculoskeletal:         General: Normal range of motion.      Cervical back: Normal range of motion.      Comments: Right buttocks near sciatic nerve exit, there is a firm area that patient swears is larger than left.  This provider could not feel a difference.  He reports it hurts intermittently.  Told him I think its from him working out.  He is seeing endo trying to get back on Testosterone.  He has a sports medicine Doc for his pec and bicep injury.  He is going to check with her due to buttocks pain.   Skin:     General: Skin is warm and dry.      Capillary Refill: Capillary refill takes less than 2 seconds.   Neurological:      General: No focal deficit present.      Mental Status: He is alert.   Psychiatric:         Mood and Affect: Mood normal.         Results         Procedures     Assessment & Plan   Diagnoses and all " orders for this visit:    1. Acute non-recurrent frontal sinusitis (Primary)  -     clindamycin (Cleocin) 300 MG capsule; Take 1 capsule by mouth 2 (Two) Times a Day.  Dispense: 14 capsule; Refill: 0    2. Acute cough  -     Discontinue: promethazine-dextromethorphan (PROMETHAZINE-DM) 6.25-15 MG/5ML syrup; Take 5 mL by mouth 4 (Four) Times a Day As Needed for Cough.  Dispense: 180 mL; Refill: 0  -     promethazine-dextromethorphan (PROMETHAZINE-DM) 6.25-15 MG/5ML syrup; Take 5 mL by mouth every night at bedtime.  Dispense: 180 mL; Refill: 0         Assessment & Plan    Diagnoses and all orders for this visit:    1. Acute non-recurrent frontal sinusitis (Primary)  -     clindamycin (Cleocin) 300 MG capsule; Take 1 capsule by mouth 2 (Two) Times a Day.  Dispense: 14 capsule; Refill: 0    2. Acute cough  -     Discontinue: promethazine-dextromethorphan (PROMETHAZINE-DM) 6.25-15 MG/5ML syrup; Take 5 mL by mouth 4 (Four) Times a Day As Needed for Cough.  Dispense: 180 mL; Refill: 0  -     promethazine-dextromethorphan (PROMETHAZINE-DM) 6.25-15 MG/5ML syrup; Take 5 mL by mouth every night at bedtime.  Dispense: 180 mL; Refill: 0         Body mass index is 29.01 kg/m².    Discussed Care Gaps, ordered referrals and encouraged vaccination updates.       - Pt agrees with plan of care and denies further questions/concerns today  - This document is intended for medical expert use only. Persons  reading this document without medical staff guidance may result in misinterpretation and unintended morbidity     Go to the ER for any possible life-threatening symptoms such as chest pain or shortness of air.      Please allow 3-5 business days for recommendations based on new results      I personally spent time with this patient, preparing for the visit, reviewing tests, obtaining and/or reviewing a separately obtained history, performing a medically appropriate examination and/or evaluation, counseling and educating the  patient/family/caregiver, ordering medications,  documenting information in the medical record and indepentently interpreting results.       MDM:  sinusitis x 2 weeks, likely bacterial at this time.  Treating as such.  Cough that keeps him awake, will treat with cough medication nightly to allow him to rest.     Return if symptoms worsen or fail to improve.          Answers submitted by the patient for this visit:  Primary Reason for Visit (Submitted on 12/30/2024)  What is the primary reason for your visit?: Problem Not Listed  Problem not listed (Submitted on 12/30/2024)  Chief Complaint: Other medical problem  Reason for appointment: Upper respiratory infection and a painful lump yhat developed over the past 5 weeks  abdominal pain: No  anorexia: No  joint pain: Yes  change in stool: No  chest pain: Yes  chills: Yes  nasal congestion: Yes  cough: Yes  diaphoresis: Yes  fatigue: Yes  fever: Yes  headaches: Yes  joint swelling: No  myalgias: No  nausea: No  neck pain: No  numbness: Yes  rash: No  sore throat: No  swollen glands: No  dysuria: No  vertigo: Yes  visual change: No  vomiting: No  weakness: Yes  Onset: 1 to 6 months  Chronicity: recurrent  Frequency: constantly  Medications tried: Over the counter pain meds

## 2025-01-02 ENCOUNTER — TELEPHONE (OUTPATIENT)
Dept: FAMILY MEDICINE CLINIC | Facility: CLINIC | Age: 40
End: 2025-01-02

## 2025-01-02 NOTE — TELEPHONE ENCOUNTER
Caller: Garrick Guo    Relationship: Self    Best call back number:     [phone]       What was the call regarding:     PATIENT WAS SCHEDULED FOR A MRI TODAY AND THEY WERE NOT ABLE TO COMPLETE THE MRI DUE TO PATIENT BECOMING DIZZY, LIGHT HEADED, AND SEEING SPOTS.    THEY SUGGESTED PATIENT REACH OUT TO YOU AND SEE WHAT OTHER OPTIONS HE HAS TO GET THIS DONE.  THEY SUGGESTED THAT HE MAY NEED MEDICATION TO CALM HIM DOWN BEFORE DOING THE MRI OR THAT HE MAY NEED TO GO TO Religious AND BE PUT TO SLEEP TO DO THE MRI.     IF NOT ABLE TO REACH PATIENT PLEASE LEAVE A MESSAGE OR POST THE INFORMATION TO HIS MYHART.      PLEASE ADVISE

## 2025-01-03 DIAGNOSIS — R29.898 WEAKNESS OF LEFT SHOULDER: ICD-10-CM

## 2025-01-03 DIAGNOSIS — S46.212A STRAIN OF LEFT BICEPS, INITIAL ENCOUNTER: ICD-10-CM

## 2025-01-03 DIAGNOSIS — R20.2 NUMBNESS AND TINGLING OF UPPER EXTREMITY: ICD-10-CM

## 2025-01-03 DIAGNOSIS — S29.011A STRAIN OF LEFT PECTORALIS MUSCLE, INITIAL ENCOUNTER: ICD-10-CM

## 2025-01-03 DIAGNOSIS — S46.912A STRAIN OF LEFT SHOULDER, INITIAL ENCOUNTER: ICD-10-CM

## 2025-01-03 DIAGNOSIS — R20.0 NUMBNESS AND TINGLING OF UPPER EXTREMITY: ICD-10-CM

## 2025-01-03 NOTE — TELEPHONE ENCOUNTER
Caller: Garrick Guo    Relationship: Self    Best call back number: [phone], IF NO ANSWER PLEASE LEAVE A MESSAGE OR SEND MYCHART MESSAGE    What was the call regarding: PATIENT IS CALLING TO CHECK IN ON HIS MESSAGE LEFT FROM YESTERDAY. PATIENT STATED HE IS WANTING KNOW WHAT TO DO BEFORE HIS NEXT ENDOCRINOLOGY APPOINTMENT. PLEASE ADVISE.

## 2025-01-08 RX ORDER — MELOXICAM 7.5 MG/1
7.5 TABLET ORAL DAILY
Qty: 30 TABLET | Refills: 0 | OUTPATIENT
Start: 2025-01-08

## 2025-01-21 ENCOUNTER — OFFICE VISIT (OUTPATIENT)
Dept: FAMILY MEDICINE CLINIC | Facility: CLINIC | Age: 40
End: 2025-01-21
Payer: COMMERCIAL

## 2025-01-21 VITALS
DIASTOLIC BLOOD PRESSURE: 74 MMHG | WEIGHT: 181.4 LBS | HEIGHT: 67 IN | HEART RATE: 70 BPM | SYSTOLIC BLOOD PRESSURE: 122 MMHG | BODY MASS INDEX: 28.47 KG/M2 | TEMPERATURE: 98.8 F | OXYGEN SATURATION: 98 %

## 2025-01-21 DIAGNOSIS — J01.00 ACUTE NON-RECURRENT MAXILLARY SINUSITIS: ICD-10-CM

## 2025-01-21 DIAGNOSIS — E78.2 MIXED HYPERLIPIDEMIA: ICD-10-CM

## 2025-01-21 DIAGNOSIS — J02.9 SORETHROAT: Primary | ICD-10-CM

## 2025-01-21 LAB
EXPIRATION DATE: ABNORMAL
EXPIRATION DATE: NORMAL
FLUAV AG UPPER RESP QL IA.RAPID: NOT DETECTED
FLUBV AG UPPER RESP QL IA.RAPID: NOT DETECTED
INTERNAL CONTROL: ABNORMAL
INTERNAL CONTROL: NORMAL
Lab: ABNORMAL
Lab: NORMAL
S PYO AG THROAT QL: NEGATIVE
SARS-COV-2 AG UPPER RESP QL IA.RAPID: NOT DETECTED

## 2025-01-21 PROCEDURE — 87880 STREP A ASSAY W/OPTIC: CPT | Performed by: NURSE PRACTITIONER

## 2025-01-21 PROCEDURE — 87428 SARSCOV & INF VIR A&B AG IA: CPT | Performed by: NURSE PRACTITIONER

## 2025-01-21 PROCEDURE — 99213 OFFICE O/P EST LOW 20 MIN: CPT | Performed by: NURSE PRACTITIONER

## 2025-01-21 RX ORDER — CLINDAMYCIN HYDROCHLORIDE 300 MG/1
300 CAPSULE ORAL 3 TIMES DAILY
Qty: 14 CAPSULE | Refills: 0 | Status: SHIPPED | OUTPATIENT
Start: 2025-01-21

## 2025-01-21 RX ORDER — LORAZEPAM 1 MG/1
TABLET ORAL
COMMUNITY
Start: 2025-01-09

## 2025-01-21 NOTE — PROGRESS NOTES
Subjective   Garrick Guo is a 39 y.o. male. Presents today for   Chief Complaint   Patient presents with    Sore Throat     Nostrils feel like on Fire         Son has Flu           History Of Present Illness  Garrick feels like his nostrils are on fire and he has had nasal congestion.    History of Present Illness      Patient Active Problem List   Diagnosis    Epilepsy    Vertigo    Substance abuse    Hyperlipidemia    Tinea pedis of both feet    Other headache syndrome    Erythrocytosis    Subarachnoid hemorrhage    Numbness of upper extremity    Cigarette smoker    High risk medication use    Neck pain, chronic    Left arm pain    Spinal stenosis in cervical region       Social History     Socioeconomic History    Marital status: Single   Tobacco Use    Smoking status: Every Day     Current packs/day: 2.00     Average packs/day: 2.0 packs/day for 23.1 years (46.1 ttl pk-yrs)     Types: Cigarettes     Start date: 1/1/2002     Passive exposure: Never    Smokeless tobacco: Former     Quit date: 2018   Vaping Use    Vaping status: Never Used   Substance and Sexual Activity    Alcohol use: No    Drug use: Yes     Types: Marijuana    Sexual activity: Yes     Partners: Female       Allergies   Allergen Reactions    Amoxicillin Other (See Comments)     Paralysis    Bactrim [Sulfamethoxazole-Trimethoprim] Seizure    Doxycycline Other (See Comments)     Seizure/memory loss    *Pt states he has tolerated and that the seizure was caused by a steroid pack    Medrol [Methylprednisolone] Dizziness    Other Other (See Comments)     PT STATES STEROID PACK CAUSED SEIZURE/MEMORY LOSS, UNSURE OF NAME       Current Outpatient Medications on File Prior to Visit   Medication Sig Dispense Refill    Accu-Chek Softclix Lancets lancets PT TO CHECK BS  each 3    albuterol sulfate  (90 Base) MCG/ACT inhaler Inhale 2 puffs Every 4 (Four) Hours As Needed for Wheezing or Shortness of Air. 18 g 3    Blood Glucose  "Monitoring Suppl (Accu-Chek Sara Plus) w/Device kit PT TO CHECK BS BID DX HYPOGLYCEMIA  DX R73.03 1 kit 0    glucose blood test strip TO CHECK BS  each 3    levETIRAcetam (KEPPRA) 1000 MG tablet Take 1 tablet by mouth Every 12 (Twelve) Hours. 180 tablet 3    LORazepam (ATIVAN) 1 MG tablet TAKE 1 TABLET BY MOUTH 1 TIME FOR 1 DOSE TAKE IT 30 MINUTES BEFORE YOUR MRI. MAX DAILY AMOUNT 1 MG       No current facility-administered medications on file prior to visit.       Objective   Vitals:    01/21/25 1331   BP: 122/74   Pulse: 70   Temp: 98.8 °F (37.1 °C)   SpO2: 98%   Weight: 82.3 kg (181 lb 6.4 oz)   Height: 170.2 cm (67\")     Body mass index is 28.41 kg/m².    Physical Exam    Physical Exam  Constitutional:       Appearance: Normal appearance.   HENT:      Head: Normocephalic and atraumatic.      Nose: Nose normal.      Mouth/Throat:      Mouth: Mucous membranes are moist.   Cardiovascular:      Rate and Rhythm: Normal rate and regular rhythm.      Pulses: Normal pulses.      Heart sounds: Normal heart sounds.   Pulmonary:      Effort: Pulmonary effort is normal.      Breath sounds: Normal breath sounds.   Abdominal:      General: Bowel sounds are normal.   Musculoskeletal:         General: Normal range of motion.      Cervical back: Normal range of motion.   Skin:     General: Skin is warm and dry.      Capillary Refill: Capillary refill takes less than 2 seconds.   Neurological:      General: No focal deficit present.      Mental Status: He is alert.   Psychiatric:         Mood and Affect: Mood normal.       Results       Strep and Covid, and Flu Negative here in the office today  Procedures     Assessment & Plan   Diagnoses and all orders for this visit:    1. Sorethroat (Primary)  -     POCT SARS-CoV-2 Antigen JENNIFER + Flu  -     POC Rapid Strep A    2. Mixed hyperlipidemia  -     CBC & Differential  -     Comprehensive Metabolic Panel  -     Lipid Panel    3. Acute non-recurrent maxillary sinusitis  -     " clindamycin (Cleocin) 300 MG capsule; Take 1 capsule by mouth 3 (Three) Times a Day.  Dispense: 14 capsule; Refill: 0         Assessment & Plan    Diagnoses and all orders for this visit:    1. Sorethroat (Primary)  -     POCT SARS-CoV-2 Antigen JENNIFER + Flu  -     POC Rapid Strep A    2. Mixed hyperlipidemia  -     CBC & Differential  -     Comprehensive Metabolic Panel  -     Lipid Panel    3. Acute non-recurrent maxillary sinusitis  -     clindamycin (Cleocin) 300 MG capsule; Take 1 capsule by mouth 3 (Three) Times a Day.  Dispense: 14 capsule; Refill: 0                 Body mass index is 28.41 kg/m².  Discussed Care Gaps, ordered referrals and encouraged vaccination updates.       - Pt agrees with plan of care and denies further questions/concerns today  - This document is intended for medical expert use only. Persons  reading this document without medical staff guidance may result in misinterpretation and unintended morbidity     Go to the ER for any possible life-threatening symptoms such as chest pain or shortness of air.      Please allow 3-5 business days for recommendations based on new results      I personally spent time with this patient, preparing for the visit, reviewing tests, obtaining and/or reviewing a separately obtained history, performing a medically appropriate examination and/or evaluation, counseling and educating the patient/family/caregiver, ordering medications,  documenting information in the medical record and indepentently interpreting results.               Return in about 6 weeks (around 3/4/2025) for Next scheduled follow up with Dr. Benjamin.     Patient or patient representative verbalized consent for the use of Ambient Listening during the visit with  BOOGIE Salazar for chart documentation. 1/23/2025  14:19 EST

## 2025-01-22 LAB
ALBUMIN SERPL-MCNC: 4.6 G/DL (ref 4.1–5.1)
ALP SERPL-CCNC: 47 IU/L (ref 44–121)
ALT SERPL-CCNC: 23 IU/L (ref 0–44)
AST SERPL-CCNC: 23 IU/L (ref 0–40)
BASOPHILS # BLD AUTO: 0.1 X10E3/UL (ref 0–0.2)
BASOPHILS NFR BLD AUTO: 1 %
BILIRUB SERPL-MCNC: 0.4 MG/DL (ref 0–1.2)
BUN SERPL-MCNC: 18 MG/DL (ref 6–24)
BUN/CREAT SERPL: 22 (ref 9–20)
CALCIUM SERPL-MCNC: 9.5 MG/DL (ref 8.7–10.2)
CHLORIDE SERPL-SCNC: 104 MMOL/L (ref 96–106)
CHOLEST SERPL-MCNC: 181 MG/DL (ref 100–199)
CO2 SERPL-SCNC: 26 MMOL/L (ref 20–29)
CREAT SERPL-MCNC: 0.83 MG/DL (ref 0.76–1.27)
EGFRCR SERPLBLD CKD-EPI 2021: 113 ML/MIN/1.73
EOSINOPHIL # BLD AUTO: 0.4 X10E3/UL (ref 0–0.4)
EOSINOPHIL NFR BLD AUTO: 6 %
ERYTHROCYTE [DISTWIDTH] IN BLOOD BY AUTOMATED COUNT: 13.5 % (ref 11.6–15.4)
GLOBULIN SER CALC-MCNC: 1.9 G/DL (ref 1.5–4.5)
GLUCOSE SERPL-MCNC: 86 MG/DL (ref 70–99)
HCT VFR BLD AUTO: 43.7 % (ref 37.5–51)
HDLC SERPL-MCNC: 48 MG/DL
HGB BLD-MCNC: 14.4 G/DL (ref 13–17.7)
IMM GRANULOCYTES # BLD AUTO: 0 X10E3/UL (ref 0–0.1)
IMM GRANULOCYTES NFR BLD AUTO: 0 %
LDLC SERPL CALC-MCNC: 121 MG/DL (ref 0–99)
LYMPHOCYTES # BLD AUTO: 1.3 X10E3/UL (ref 0.7–3.1)
LYMPHOCYTES NFR BLD AUTO: 19 %
MCH RBC QN AUTO: 31 PG (ref 26.6–33)
MCHC RBC AUTO-ENTMCNC: 33 G/DL (ref 31.5–35.7)
MCV RBC AUTO: 94 FL (ref 79–97)
MONOCYTES # BLD AUTO: 0.6 X10E3/UL (ref 0.1–0.9)
MONOCYTES NFR BLD AUTO: 8 %
NEUTROPHILS # BLD AUTO: 4.5 X10E3/UL (ref 1.4–7)
NEUTROPHILS NFR BLD AUTO: 66 %
PLATELET # BLD AUTO: 211 X10E3/UL (ref 150–450)
POTASSIUM SERPL-SCNC: 4.6 MMOL/L (ref 3.5–5.2)
PROT SERPL-MCNC: 6.5 G/DL (ref 6–8.5)
RBC # BLD AUTO: 4.64 X10E6/UL (ref 4.14–5.8)
SODIUM SERPL-SCNC: 141 MMOL/L (ref 134–144)
TRIGL SERPL-MCNC: 61 MG/DL (ref 0–149)
VLDLC SERPL CALC-MCNC: 12 MG/DL (ref 5–40)
WBC # BLD AUTO: 7 X10E3/UL (ref 3.4–10.8)

## 2025-01-23 ENCOUNTER — OFFICE VISIT (OUTPATIENT)
Dept: NEUROLOGY | Facility: CLINIC | Age: 40
End: 2025-01-23
Payer: COMMERCIAL

## 2025-01-23 VITALS
OXYGEN SATURATION: 97 % | SYSTOLIC BLOOD PRESSURE: 110 MMHG | WEIGHT: 181 LBS | BODY MASS INDEX: 28.41 KG/M2 | HEIGHT: 67 IN | HEART RATE: 80 BPM | RESPIRATION RATE: 20 BRPM | DIASTOLIC BLOOD PRESSURE: 80 MMHG

## 2025-01-23 DIAGNOSIS — R56.9 SEIZURES: ICD-10-CM

## 2025-01-23 DIAGNOSIS — G43.709 CHRONIC MIGRAINE W/O AURA, NOT INTRACTABLE, W/O STAT MIGR: Primary | ICD-10-CM

## 2025-01-23 DIAGNOSIS — Z87.828 HISTORY OF TRAUMATIC HEAD INJURY: ICD-10-CM

## 2025-01-23 DIAGNOSIS — R42 DIZZINESS: ICD-10-CM

## 2025-01-23 RX ORDER — VENLAFAXINE 37.5 MG/1
TABLET ORAL
Qty: 60 TABLET | Refills: 2 | Status: SHIPPED | OUTPATIENT
Start: 2025-01-23
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
## 2025-01-24 ENCOUNTER — PATIENT ROUNDING (BHMG ONLY) (OUTPATIENT)
Dept: NEUROLOGY | Facility: CLINIC | Age: 40
End: 2025-01-24
Payer: COMMERCIAL

## 2025-02-03 DIAGNOSIS — G40.509 NONINTRACTABLE EPILEPSY DUE TO EXTERNAL CAUSES, WITHOUT STATUS EPILEPTICUS: ICD-10-CM

## 2025-02-03 RX ORDER — LEVETIRACETAM 1000 MG/1
1000 TABLET ORAL EVERY 12 HOURS
Qty: 180 TABLET | Refills: 0 | Status: SHIPPED | OUTPATIENT
Start: 2025-02-03

## 2025-03-04 ENCOUNTER — OFFICE VISIT (OUTPATIENT)
Dept: FAMILY MEDICINE CLINIC | Facility: CLINIC | Age: 40
End: 2025-03-04
Payer: COMMERCIAL

## 2025-03-04 VITALS
BODY MASS INDEX: 28.11 KG/M2 | HEART RATE: 76 BPM | DIASTOLIC BLOOD PRESSURE: 72 MMHG | WEIGHT: 179.1 LBS | SYSTOLIC BLOOD PRESSURE: 108 MMHG | HEIGHT: 67 IN | OXYGEN SATURATION: 98 %

## 2025-03-04 DIAGNOSIS — G40.509 NONINTRACTABLE EPILEPSY DUE TO EXTERNAL CAUSES, WITHOUT STATUS EPILEPTICUS: ICD-10-CM

## 2025-03-04 DIAGNOSIS — G43.709 CHRONIC MIGRAINE W/O AURA, NOT INTRACTABLE, W/O STAT MIGR: ICD-10-CM

## 2025-03-04 DIAGNOSIS — H61.23 BILATERAL IMPACTED CERUMEN: ICD-10-CM

## 2025-03-04 DIAGNOSIS — E29.1 SECONDARY MALE HYPOGONADISM: ICD-10-CM

## 2025-03-04 DIAGNOSIS — J01.10 ACUTE NON-RECURRENT FRONTAL SINUSITIS: ICD-10-CM

## 2025-03-04 DIAGNOSIS — E78.2 MIXED HYPERLIPIDEMIA: Primary | ICD-10-CM

## 2025-03-04 DIAGNOSIS — L40.9 PSORIASIS: ICD-10-CM

## 2025-03-04 PROCEDURE — 69209 REMOVE IMPACTED EAR WAX UNI: CPT | Performed by: FAMILY MEDICINE

## 2025-03-04 PROCEDURE — 99214 OFFICE O/P EST MOD 30 MIN: CPT | Performed by: FAMILY MEDICINE

## 2025-03-04 RX ORDER — CLINDAMYCIN HYDROCHLORIDE 150 MG/1
150 CAPSULE ORAL 3 TIMES DAILY
Qty: 30 CAPSULE | Refills: 0 | Status: SHIPPED | OUTPATIENT
Start: 2025-03-04

## 2025-03-04 RX ORDER — LEVETIRACETAM 1000 MG/1
1000 TABLET ORAL EVERY 12 HOURS
Qty: 180 TABLET | Refills: 3 | Status: SHIPPED | OUTPATIENT
Start: 2025-03-04

## 2025-03-04 RX ORDER — CLOBETASOL PROPIONATE 0.5 MG/G
1 CREAM TOPICAL 2 TIMES DAILY
Qty: 15 G | Refills: 2 | Status: SHIPPED | OUTPATIENT
Start: 2025-03-04

## 2025-03-04 NOTE — PROGRESS NOTES
Subjective   Garrick Guo is a 39 y.o. male. Presents today for   Chief Complaint   Patient presents with    Med Management     Seizure Meds    Dyslipidemia           History of Present Illness  Patient 40 y/o male with seizure d/o no recent seizures, needs refills and due for labs;   Has hld, would like recheck;  Has been seeing neurology for dizziness and light sensitivity; R eports never took venlafaxine as read online can change brain.  He never got ubrelvy as reports supposedly Rx was written wrong and pharmacy alleges not heard back.  Looks ok to me, but I recent for him today.   He has been using exogenous testosterone for quite sometime.  He had labs checked noted testosterone extremely high and secondary polycythemia.   I sent to Endocrinology and of course felt all secondary to exogenous exposure and possibly can recover.  He was not able to get MRI pituitary due to dizziness, tried the lorazepam given by Endo, but felt made worse and did not get.   He states has ehadaches, but not sure migraines;  Has episodes of nonspecific dizziness and very light sensitive (photophobia).   Report has been sick with sinus pain, pressure for couple weeks;   Also ears full with hx of cerumen impaction;  Trying debrox but no relief.   He has orders from Endo for labs, but lab now not doing outisde orders reportedly.  He also has silver plaque left elbow, not had before or other locations.   Review of Systems   HENT:  Positive for congestion, hearing loss, postnasal drip, sinus pressure and sinus pain.    Respiratory:  Negative for shortness of breath.    Cardiovascular:  Negative for chest pain and palpitations.   Gastrointestinal:  Negative for abdominal pain.       Patient Active Problem List   Diagnosis    Epilepsy    Vertigo    Substance abuse    Hyperlipidemia    Tinea pedis of both feet    Other headache syndrome    Erythrocytosis    Subarachnoid hemorrhage    Numbness of upper extremity    Cigarette smoker  "   High risk medication use    Neck pain, chronic    Left arm pain    Spinal stenosis in cervical region       Social History     Socioeconomic History    Marital status: Single   Tobacco Use    Smoking status: Every Day     Current packs/day: 2.00     Average packs/day: 2.0 packs/day for 23.2 years (46.3 ttl pk-yrs)     Types: Cigarettes     Start date: 1/1/2002     Passive exposure: Never    Smokeless tobacco: Former     Quit date: 2018   Vaping Use    Vaping status: Never Used   Substance and Sexual Activity    Alcohol use: Yes     Comment: Rare    Drug use: Yes     Types: Marijuana    Sexual activity: Yes     Partners: Female       Allergies   Allergen Reactions    Amoxicillin Other (See Comments)     Paralysis    Bactrim [Sulfamethoxazole-Trimethoprim] Seizure    Doxycycline Other (See Comments)     Seizure/memory loss    *Pt states he has tolerated and that the seizure was caused by a steroid pack    Medrol [Methylprednisolone] Dizziness    Other Other (See Comments)     PT STATES STEROID PACK CAUSED SEIZURE/MEMORY LOSS, UNSURE OF NAME         Objective   Vitals:    03/04/25 0808   BP: 108/72   Pulse: 76   SpO2: 98%   Weight: 81.2 kg (179 lb 1.6 oz)   Height: 170.2 cm (67.01\")     Body mass index is 28.04 kg/m².    Physical Exam  Vitals and nursing note reviewed.   Constitutional:       Appearance: He is well-developed.   HENT:      Head: Normocephalic and atraumatic.      Right Ear: Decreased hearing noted. There is impacted cerumen.      Left Ear: Decreased hearing noted. There is impacted cerumen.      Ears:      Comments: MA irrigated with removal of cerumen, recheck b/l TM clear     Nose: Mucosal edema and congestion present.   Neck:      Thyroid: No thyromegaly.      Vascular: No JVD.   Cardiovascular:      Rate and Rhythm: Normal rate and regular rhythm.      Heart sounds: Normal heart sounds. No murmur heard.     No friction rub. No gallop.   Pulmonary:      Effort: Pulmonary effort is normal. No " respiratory distress.      Breath sounds: Normal breath sounds. No wheezing or rales.   Abdominal:      General: Bowel sounds are normal. There is no distension.      Palpations: Abdomen is soft.      Tenderness: There is no abdominal tenderness. There is no guarding or rebound.   Musculoskeletal:      Cervical back: Neck supple.   Skin:     General: Skin is warm and dry.      Comments: Over left olecranon silvery plaque   Neurological:      Mental Status: He is alert.      Gait: Gait normal.   Psychiatric:         Behavior: Behavior normal.         Results     Ear Cerumen Removal    Date/Time: 3/4/2025 8:49 AM    Performed by: Barbara Duarte MA  Authorized by: Will Benjamin DO  Consent: Verbal consent obtained.  Risks and benefits: risks, benefits and alternatives were discussed  Consent given by: patient  Patient understanding: patient states understanding of the procedure being performed  Required items: required blood products, implants, devices, and special equipment available  Patient identity confirmed: verbally with patient    Anesthesia:  Local Anesthetic: none  Location details: right ear and left ear  Patient tolerance: Patient tolerated the procedure well with no immediate complications  Procedure type: irrigation   Sedation:  Patient sedated: no            Assessment & Plan   Diagnoses and all orders for this visit:    1. Mixed hyperlipidemia (Primary)  -     Comprehensive Metabolic Panel  -     Lipid Panel    2. Nonintractable epilepsy due to external causes, without status epilepticus  -     levETIRAcetam (KEPPRA) 1000 MG tablet; Take 1 tablet by mouth Every 12 (Twelve) Hours.  Dispense: 180 tablet; Refill: 3  -     CBC (No Diff)  -     Comprehensive Metabolic Panel    3. Chronic migraine w/o aura, not intractable, w/o stat migr  -     ubrogepant (Ubrelvy) 50 MG tablet; Take 1 tablet by mouth Daily As Needed (migraine). Do not take in same day as other Ubrelvy dose. Do not take in same day as  Nurtec  Dispense: 10 tablet; Refill: 2    4. Secondary male hypogonadism  -     FSH & LH  -     Testosterone, Free, Total    5. Psoriasis - left elbow  -     clobetasol propionate (TEMOVATE) 0.05 % cream; Apply 1 Application topically to the appropriate area as directed 2 (Two) Times a Day. X 3 weeks then 1 week off for psoriasis left elbow  Dispense: 15 g; Refill: 2    6. Bilateral impacted cerumen    7. Acute non-recurrent frontal sinusitis    Seizure d/o - check cbc, cmp and continue medication;    Hypogonadism - hopefully will recover, appreciate Endocrinology seeing and helping impress serious risks of secondary testosterone.  I have explained several times the serious risks and do not recommend.   Defer to Endocrinology if needs MRI pituitary.  I ordered labs and will forward so can get done today;  Hld - check lipids, would like checked again  Plaque over elbow - ? Psoriasis, will give topical steroid, but to not use long-term as high potency;  off at least 1 week if use consistently on regular basis    Sinuses - continue nasal saline rinses  Migraines - see neurology back to discuss, sounds like atypical;  I did resend ubrelvy for him.   Assessment & Plan            -Follow up: 6 months and prn     ________________________________________  Will Benjamin DO, MS    Current Outpatient Medications on File Prior to Visit   Medication Sig Dispense Refill    Accu-Chek Softclix Lancets lancets PT TO CHECK BS  each 3    albuterol sulfate  (90 Base) MCG/ACT inhaler Inhale 2 puffs Every 4 (Four) Hours As Needed for Wheezing or Shortness of Air. 18 g 3    Blood Glucose Monitoring Suppl (Accu-Chek Sara Plus) w/Device kit PT TO CHECK BS BID DX HYPOGLYCEMIA  DX R73.03 1 kit 0    glucose blood test strip TO CHECK BS  each 3    [DISCONTINUED] levETIRAcetam (KEPPRA) 1000 MG tablet TAKE 1 TABLET BY MOUTH EVERY 12 HOURS 180 tablet 0    [DISCONTINUED] clindamycin (Cleocin) 300 MG capsule Take 1  capsule by mouth 3 (Three) Times a Day. (Patient not taking: Reported on 3/4/2025) 14 capsule 0    [DISCONTINUED] LORazepam (ATIVAN) 1 MG tablet TAKE 1 TABLET BY MOUTH 1 TIME FOR 1 DOSE TAKE IT 30 MINUTES BEFORE YOUR MRI. MAX DAILY AMOUNT 1 MG (Patient not taking: Reported on 3/4/2025)      [DISCONTINUED] ubrogepant (Ubrelvy) 50 MG tablet Take 1 tablet by mouth Daily As Needed (migraine) for up to 2 doses. Do not take in same day as other Ubrelvy dose. Do not take in same day as Nurtec (Patient not taking: Reported on 3/4/2025) 2 tablet 0    [DISCONTINUED] ubrogepant (UBRELVY) 50 MG tablet Take 1 tablet by mouth As Needed (migraine). (Patient not taking: Reported on 3/4/2025) 9 tablet 2    [DISCONTINUED] venlafaxine (EFFEXOR) 37.5 MG tablet Take one tablet daily for two weeks, then increase to two tablets daily (Patient not taking: Reported on 3/4/2025) 60 tablet 2     No current facility-administered medications on file prior to visit.

## 2025-03-06 LAB
ALBUMIN SERPL-MCNC: 4.3 G/DL (ref 3.5–5.2)
ALBUMIN/GLOB SERPL: 1.9 G/DL
ALP SERPL-CCNC: 47 U/L (ref 39–117)
ALT SERPL-CCNC: 20 U/L (ref 1–41)
AST SERPL-CCNC: 16 U/L (ref 1–40)
BILIRUB SERPL-MCNC: 0.5 MG/DL (ref 0–1.2)
BUN SERPL-MCNC: 13 MG/DL (ref 6–20)
BUN/CREAT SERPL: 16 (ref 7–25)
CALCIUM SERPL-MCNC: 9.4 MG/DL (ref 8.6–10.5)
CHLORIDE SERPL-SCNC: 106 MMOL/L (ref 98–107)
CHOLEST SERPL-MCNC: 181 MG/DL (ref 0–200)
CO2 SERPL-SCNC: 26.8 MMOL/L (ref 22–29)
CREAT SERPL-MCNC: 0.81 MG/DL (ref 0.76–1.27)
EGFRCR SERPLBLD CKD-EPI 2021: 114.3 ML/MIN/1.73
ERYTHROCYTE [DISTWIDTH] IN BLOOD BY AUTOMATED COUNT: 13.2 % (ref 12.3–15.4)
FSH SERPL-ACNC: 1.9 MIU/ML (ref 1.5–12.4)
GLOBULIN SER CALC-MCNC: 2.3 GM/DL
GLUCOSE SERPL-MCNC: 87 MG/DL (ref 65–99)
HCT VFR BLD AUTO: 42.7 % (ref 37.5–51)
HDLC SERPL-MCNC: 53 MG/DL (ref 40–60)
HGB BLD-MCNC: 14.5 G/DL (ref 13–17.7)
LDLC SERPL CALC-MCNC: 115 MG/DL (ref 0–100)
LH SERPL-ACNC: 3 MIU/ML (ref 1.7–8.6)
MCH RBC QN AUTO: 32.2 PG (ref 26.6–33)
MCHC RBC AUTO-ENTMCNC: 34 G/DL (ref 31.5–35.7)
MCV RBC AUTO: 94.7 FL (ref 79–97)
PLATELET # BLD AUTO: 241 10*3/MM3 (ref 140–450)
POTASSIUM SERPL-SCNC: 4.3 MMOL/L (ref 3.5–5.2)
PROT SERPL-MCNC: 6.6 G/DL (ref 6–8.5)
RBC # BLD AUTO: 4.51 10*6/MM3 (ref 4.14–5.8)
SODIUM SERPL-SCNC: 143 MMOL/L (ref 136–145)
TESTOST FREE SERPL-MCNC: 4.2 PG/ML (ref 6.8–21.5)
TESTOST SERPL-MCNC: 102 NG/DL (ref 264–916)
TRIGL SERPL-MCNC: 70 MG/DL (ref 0–150)
VLDLC SERPL CALC-MCNC: 13 MG/DL (ref 5–40)
WBC # BLD AUTO: 6.04 10*3/MM3 (ref 3.4–10.8)

## 2025-04-29 ENCOUNTER — TELEPHONE (OUTPATIENT)
Dept: FAMILY MEDICINE CLINIC | Facility: CLINIC | Age: 40
End: 2025-04-29
Payer: COMMERCIAL

## 2025-04-29 DIAGNOSIS — E23.0 HYPOPITUITARISM: ICD-10-CM

## 2025-04-29 DIAGNOSIS — E78.5 DYSLIPIDEMIA: Primary | ICD-10-CM

## 2025-04-29 DIAGNOSIS — E29.1 HYPOGONADISM IN MALE: ICD-10-CM

## 2025-04-29 NOTE — TELEPHONE ENCOUNTER
Caller: Garrick Guo    Relationship: Self    Best call back number: [phone]     What orders are you requesting (i.e. lab or imaging): SAME LAB ORDERS FROM 2 MONTHS AGO     In what timeframe would the patient need to come in: 4/30/25    Where will you receive your lab/imaging services: IN OFFICE

## 2025-05-01 LAB
ALBUMIN SERPL-MCNC: 4.5 G/DL (ref 3.5–5.2)
ALBUMIN/GLOB SERPL: 2 G/DL
ALP SERPL-CCNC: 56 U/L (ref 39–117)
ALT SERPL-CCNC: 20 U/L (ref 1–41)
AST SERPL-CCNC: 19 U/L (ref 1–40)
BILIRUB SERPL-MCNC: 0.3 MG/DL (ref 0–1.2)
BUN SERPL-MCNC: 18 MG/DL (ref 6–20)
BUN/CREAT SERPL: 20.9 (ref 7–25)
CALCIUM SERPL-MCNC: 9.4 MG/DL (ref 8.6–10.5)
CHLORIDE SERPL-SCNC: 107 MMOL/L (ref 98–107)
CHOLEST SERPL-MCNC: 202 MG/DL (ref 0–200)
CO2 SERPL-SCNC: 19.8 MMOL/L (ref 22–29)
CREAT SERPL-MCNC: 0.86 MG/DL (ref 0.76–1.27)
EGFRCR SERPLBLD CKD-EPI 2021: 112.3 ML/MIN/1.73
ERYTHROCYTE [DISTWIDTH] IN BLOOD BY AUTOMATED COUNT: 12.6 % (ref 12.3–15.4)
FSH SERPL-ACNC: <0.3 MIU/ML (ref 1.5–12.4)
GLOBULIN SER CALC-MCNC: 2.2 GM/DL
GLUCOSE SERPL-MCNC: 92 MG/DL (ref 65–99)
HCT VFR BLD AUTO: 52.1 % (ref 37.5–51)
HDLC SERPL-MCNC: 44 MG/DL (ref 40–60)
HGB BLD-MCNC: 17.3 G/DL (ref 13–17.7)
LDLC SERPL CALC-MCNC: 139 MG/DL (ref 0–100)
LH SERPL-ACNC: <0.3 MIU/ML (ref 1.7–8.6)
MCH RBC QN AUTO: 32 PG (ref 26.6–33)
MCHC RBC AUTO-ENTMCNC: 33.2 G/DL (ref 31.5–35.7)
MCV RBC AUTO: 96.5 FL (ref 79–97)
PLATELET # BLD AUTO: 237 10*3/MM3 (ref 140–450)
POTASSIUM SERPL-SCNC: 4.5 MMOL/L (ref 3.5–5.2)
PROT SERPL-MCNC: 6.7 G/DL (ref 6–8.5)
RBC # BLD AUTO: 5.4 10*6/MM3 (ref 4.14–5.8)
SODIUM SERPL-SCNC: 142 MMOL/L (ref 136–145)
TESTOST FREE SERPL-MCNC: 32.7 PG/ML (ref 6.8–21.5)
TESTOST SERPL-MCNC: 1295 NG/DL (ref 264–916)
TRIGL SERPL-MCNC: 104 MG/DL (ref 0–150)
VLDLC SERPL CALC-MCNC: 19 MG/DL (ref 5–40)
WBC # BLD AUTO: 6.84 10*3/MM3 (ref 3.4–10.8)

## 2025-05-04 RX ORDER — ROSUVASTATIN CALCIUM 5 MG/1
5 TABLET, COATED ORAL DAILY
Qty: 90 TABLET | Refills: 3 | Status: SHIPPED | OUTPATIENT
Start: 2025-05-04

## 2025-06-06 DIAGNOSIS — R06.2 WHEEZING: ICD-10-CM

## 2025-06-06 RX ORDER — ALBUTEROL SULFATE 90 UG/1
INHALANT RESPIRATORY (INHALATION)
Qty: 8.5 G | Refills: 1 | Status: SHIPPED | OUTPATIENT
Start: 2025-06-06

## 2025-06-17 ENCOUNTER — TELEPHONE (OUTPATIENT)
Dept: FAMILY MEDICINE CLINIC | Facility: CLINIC | Age: 40
End: 2025-06-17
Payer: COMMERCIAL

## 2025-06-17 ENCOUNTER — OFFICE VISIT (OUTPATIENT)
Dept: FAMILY MEDICINE CLINIC | Facility: CLINIC | Age: 40
End: 2025-06-17
Payer: COMMERCIAL

## 2025-06-17 VITALS
DIASTOLIC BLOOD PRESSURE: 64 MMHG | BODY MASS INDEX: 28.63 KG/M2 | HEART RATE: 49 BPM | HEIGHT: 67 IN | SYSTOLIC BLOOD PRESSURE: 108 MMHG | WEIGHT: 182.4 LBS | OXYGEN SATURATION: 97 %

## 2025-06-17 DIAGNOSIS — J30.2 SEASONAL ALLERGIES: Primary | ICD-10-CM

## 2025-06-17 DIAGNOSIS — E78.5 DYSLIPIDEMIA: Primary | ICD-10-CM

## 2025-06-17 PROCEDURE — 99213 OFFICE O/P EST LOW 20 MIN: CPT | Performed by: NURSE PRACTITIONER

## 2025-06-17 RX ORDER — NEEDLES, SAFETY 18GX1 1/2"
NEEDLE, DISPOSABLE MISCELLANEOUS
COMMUNITY
Start: 2025-05-29

## 2025-06-17 RX ORDER — TESTOSTERONE CYPIONATE 200 MG/ML
VIAL (ML) INTRAMUSCULAR
COMMUNITY
Start: 2025-02-28

## 2025-06-17 RX ORDER — FLUTICASONE PROPIONATE 50 MCG
2 SPRAY, SUSPENSION (ML) NASAL DAILY
Qty: 16 G | Refills: 3 | Status: SHIPPED | OUTPATIENT
Start: 2025-06-17

## 2025-06-17 NOTE — TELEPHONE ENCOUNTER
Patient would like to come in tomorrow to get his Cholesterol  checked. He has changed his diet and its been 7 weeks and wants to know if anything has changed. Please place order.

## 2025-06-17 NOTE — PROGRESS NOTES
Subjective   Garrick Guo is a 39 y.o. male. Presents today for No chief complaint on file.      History Of Present Illness Garrick Guo is a 39-year-old male presenting today with chest back and hip pain, he reports right chest pain.  Hip pain for months - in the joint itself.  Squats bother him.   The pain is sharp.  He is experiencing dizziness daily, was told by neuro it was migraines.  Gave Qulipta to see if we can pre-empt migraines    History of Present Illness  The patient presents for evaluation of chest pain, hip pain, migraines, and elevated cholesterol.    He has been experiencing chest pain since Thursday, which intensifies between 7:40 PM and 9:50 PM. Accompanying symptoms include a sore throat and cough. He suspects pleurisy as the cause of his discomfort. He also reports an itchy throat, persistent cough, and congestion. He feels the need to adjust his position for optimal breathing. Currently, his right nostril is clear while the left is not. He experiences frequent urination at night due to high fluid intake, necessitating alternating breathing through each nostril. He has never experienced complete clearance of both nostrils.    He reports a sensation of misalignment in his back, which he attributes to deadlifts. He is considering chiropractic treatment for his back and neck, as he experiences constant neck pain.    His primary concern is his right hip, which has been causing him discomfort for several months. The pain is absent when his leg is straight but becomes sharp with any movement such as turning, twisting, or squatting. This sudden onset of sharp pain occurs even during simple tasks like making a drink. Improper heel placement or outward toe pointing during walking also triggers the pain. He used to be able to pop his hip but can no longer do so. The pain, while not excruciating, is bothersome and affects his ability to perform squats, although it does not interfere with  deadlifts. He recalls gaining 14 pounds when he was off testosterone and felt weaker and fatter despite working out. Upon resuming testosterone, he noticed an increase in strength and weight loss, but also the onset of hip pain. He continues to perform deadlifts and various squats, but the hip pain persists.    His neurologist prescribed Ubrelvy for his migraines, but he has not taken it due to daily dizziness and lightheadedness. He was also prescribed an antidepressant, which he has not taken. He has discontinued ibuprofen as it did not alleviate his dizziness or lightheadedness. He takes baby aspirin at night for his heart as advised by Dr. Benjamin. He experiences daily lightheadedness and dizziness upon waking and has been forcing himself to adapt to light sensitivity. His work involves prolonged computer use, which exacerbates his symptoms and leads to increased smoking.    He has requested lab tests for his cholesterol levels, as he has not been taking his prescribed medication. He has made dietary changes, eliminating alcohol and red meat, and consuming primarily fruit smoothies, rice, and chicken breast. He hopes these changes will naturally lower his cholesterol levels.    SOCIAL HISTORY  The patient smokes cigarettes.    MEDICATIONS  Current: Keppra, baby aspirin  Discontinued: ibuprofen    Patient Active Problem List   Diagnosis    Epilepsy    Vertigo    Substance abuse    Hyperlipidemia    Tinea pedis of both feet    Other headache syndrome    Erythrocytosis    Subarachnoid hemorrhage    Numbness of upper extremity    Cigarette smoker    High risk medication use    Neck pain, chronic    Left arm pain    Spinal stenosis in cervical region       Social History     Socioeconomic History    Marital status: Single   Tobacco Use    Smoking status: Every Day     Current packs/day: 2.00     Average packs/day: 2.0 packs/day for 23.5 years (46.9 ttl pk-yrs)     Types: Cigarettes     Start date: 1/1/2002      Passive exposure: Never    Smokeless tobacco: Former     Quit date: 2018   Vaping Use    Vaping status: Never Used   Substance and Sexual Activity    Alcohol use: Yes     Comment: Rare    Drug use: Yes     Types: Marijuana    Sexual activity: Yes     Partners: Female       Allergies   Allergen Reactions    Amoxicillin Other (See Comments)     Paralysis    Lorazepam Dizziness     Reports felt awful after taking dizziness worse    Bactrim [Sulfamethoxazole-Trimethoprim] Seizure    Doxycycline Other (See Comments)     Seizure/memory loss    *Pt states he has tolerated and that the seizure was caused by a steroid pack    Medrol [Methylprednisolone] Dizziness    Other Other (See Comments)     PT STATES STEROID PACK CAUSED SEIZURE/MEMORY LOSS, UNSURE OF NAME       Current Outpatient Medications on File Prior to Visit   Medication Sig Dispense Refill    Accu-Chek Softclix Lancets lancets PT TO CHECK BS  each 3    albuterol sulfate  (90 Base) MCG/ACT inhaler INHALE 2 PUFFS EVERY 4 HOURS AS NEEDED FOR WHEEZING OR SHORTNESS OF BREATH 8.5 g 1    Blood Glucose Monitoring Suppl (Accu-Chek Sara Plus) w/Device kit PT TO CHECK BS BID DX HYPOGLYCEMIA  DX R73.03 1 kit 0    clindamycin (Cleocin) 150 MG capsule Take 1 capsule by mouth 3 (Three) Times a Day. 30 capsule 0    clobetasol propionate (TEMOVATE) 0.05 % cream Apply 1 Application topically to the appropriate area as directed 2 (Two) Times a Day. X 3 weeks then 1 week off for psoriasis left elbow 15 g 2    glucose blood test strip TO CHECK BS  each 3    levETIRAcetam (KEPPRA) 1000 MG tablet Take 1 tablet by mouth Every 12 (Twelve) Hours. 180 tablet 3    rosuvastatin (Crestor) 5 MG tablet Take 1 tablet by mouth Daily. 90 tablet 3    ubrogepant (Ubrelvy) 50 MG tablet Take 1 tablet by mouth Daily As Needed (migraine). Do not take in same day as other Ubrelvy dose. Do not take in same day as Nurtec 10 tablet 2     No current facility-administered medications on  file prior to visit.       Objective   There were no vitals filed for this visit.  There is no height or weight on file to calculate BMI.    Physical Exam  Lungs are clear. No rubs heard.  Physical Exam  Constitutional:       Appearance: Normal appearance.   HENT:      Head: Normocephalic and atraumatic.   Cardiovascular:      Rate and Rhythm: Normal rate and regular rhythm.      Pulses: Normal pulses.      Heart sounds: Normal heart sounds.   Pulmonary:      Effort: Pulmonary effort is normal.      Breath sounds: Normal breath sounds.   Musculoskeletal:      Comments: Reports his back pain is from dead-lifting.  States right hip pain occurs with squats - external rotation of the hip to open the hips in order to squat.   Neurological:      Mental Status: He is alert.         Results  Testing  EKG was normal with no change from the one done in 2018.  EKG SHOWS Sinus Bradycardia  Vent Rate 47 BPM  VA int      196   ms  QRS dur   101 ms  QT/Qtc  445/407 ms  P-R-T axes 9 -11 1    No change noted from 2/16/2018 EKG in chart.     Procedures     Assessment & Plan   There are no diagnoses linked to this encounter.     Assessment & Plan  1. Chest pain.  The EKG results were normal, showing no changes since the last one in 2018. The chest pain may be related to pleurisy or muscle strain. He was advised to monitor his symptoms and seek medical attention if they worsen.    2. Hip pain.  The hip pain could be due to arthritis or a bone spur. An x-ray of the pelvis will be ordered to investigate further. If the x-ray shows any abnormalities, further treatment options will be discussed.    3. Migraines.  He experiences daily migraines with symptoms including dizziness, lightheadedness, neck stiffness, fatigue, difficulty concentrating, and sensitivity to light. A sample of a daily migraine medication will be provided for him to try. If it proves effective, he should contact the office via [email or web address] for a prescription.    4. Elevated  cholesterol.  He has not been taking his prescribed cholesterol medication. He has made dietary changes, including reducing alcohol and red meat intake and consuming more fruits, smoothies, rice, and chicken breast. A fasting cholesterol test will be ordered to assess the effectiveness of these dietary changes.    5. Back pain.  He reports a sensation of misalignment in his back, which he attributes to deadlifts. He is considering chiropractic treatment for his back and neck, as he experiences constant neck pain.                 No follow-ups on file.     Discussed Care Gaps, ordered referrals and encouraged vaccination updates.       - Pt agrees with plan of care and denies further questions/concerns today  - This document is intended for medical expert use only. Persons  reading this document without medical staff guidance may result in misinterpretation and unintended morbidity     Go to the ER for any possible life-threatening symptoms such as chest pain or shortness of air.      Please allow 3-5 business days for recommendations based on new results      I personally spent time with this patient, preparing for the visit, reviewing tests, obtaining and/or reviewing a separately obtained history, performing a medically appropriate examination and/or evaluation, counseling and educating the patient/family/caregiver, ordering medications,  documenting information in the medical record and indepentently interpreting results.         Patient or patient representative verbalized consent for the use of Ambient Listening during the visit with  BOOGIE Salazar for chart documentation. 6/17/2025  16:46 EDT     Answers submitted by the patient for this visit:  Problem not listed (Submitted on 6/17/2025)  Chief Complaint: Other medical problem  abdominal pain: Yes  anorexia: No  joint pain: Yes  change in stool: No  chest pain: Yes  chills: No  nasal congestion: Yes  cough: Yes  diaphoresis: No  fatigue: Yes  fever:  No  headaches: Yes  joint swelling: No  myalgias: Yes  nausea: No  neck pain: Yes  numbness: Yes  rash: No  sore throat: No  swollen glands: No  dysuria: No  vertigo: Yes  visual change: No  vomiting: No  weakness: Yes  Onset: 1 to 6 months  Chronicity: recurrent  Frequency: constantly

## 2025-06-18 LAB
CHOLEST SERPL-MCNC: 167 MG/DL (ref 0–200)
HDLC SERPL-MCNC: 36 MG/DL (ref 40–60)
LDLC SERPL CALC-MCNC: 113 MG/DL (ref 0–100)
TRIGL SERPL-MCNC: 98 MG/DL (ref 0–150)
VLDLC SERPL CALC-MCNC: 18 MG/DL (ref 5–40)